# Patient Record
Sex: MALE | Race: BLACK OR AFRICAN AMERICAN | Employment: OTHER | URBAN - METROPOLITAN AREA
[De-identification: names, ages, dates, MRNs, and addresses within clinical notes are randomized per-mention and may not be internally consistent; named-entity substitution may affect disease eponyms.]

---

## 2017-02-17 RX ORDER — ERGOCALCIFEROL 1.25 MG/1
CAPSULE ORAL
Qty: 4 CAPSULE | Refills: 0 | Status: SHIPPED | OUTPATIENT
Start: 2017-02-17 | End: 2017-05-16 | Stop reason: ALTCHOICE

## 2017-03-13 RX ORDER — LOSARTAN POTASSIUM 100 MG/1
TABLET ORAL
Qty: 30 TABLET | Refills: 1 | Status: SHIPPED | OUTPATIENT
Start: 2017-03-13 | End: 2017-05-16 | Stop reason: ALTCHOICE

## 2017-03-31 ENCOUNTER — OFFICE VISIT (OUTPATIENT)
Dept: FAMILY MEDICINE CLINIC | Age: 62
End: 2017-03-31
Payer: MEDICAID

## 2017-03-31 ENCOUNTER — HOSPITAL ENCOUNTER (OUTPATIENT)
Age: 62
Setting detail: SPECIMEN
Discharge: HOME | End: 2017-03-31
Payer: MEDICAID

## 2017-03-31 VITALS
BODY MASS INDEX: 39.57 KG/M2 | HEART RATE: 80 BPM | SYSTOLIC BLOOD PRESSURE: 130 MMHG | DIASTOLIC BLOOD PRESSURE: 80 MMHG | WEIGHT: 237.5 LBS | TEMPERATURE: 98 F | OXYGEN SATURATION: 98 % | HEIGHT: 65 IN

## 2017-03-31 DIAGNOSIS — R31.9 HEMATURIA: ICD-10-CM

## 2017-03-31 DIAGNOSIS — Z01.818 PREOPERATIVE CLEARANCE: ICD-10-CM

## 2017-03-31 DIAGNOSIS — R73.9 HYPERGLYCEMIA: ICD-10-CM

## 2017-03-31 DIAGNOSIS — J44.9 CHRONIC OBSTRUCTIVE PULMONARY DISEASE, UNSPECIFIED COPD TYPE (HCC): ICD-10-CM

## 2017-03-31 DIAGNOSIS — Z01.818 PREOP EXAMINATION: ICD-10-CM

## 2017-03-31 DIAGNOSIS — R06.81 APNEIC EPISODE: ICD-10-CM

## 2017-03-31 DIAGNOSIS — E55.9 VITAMIN D DEFICIENCY: ICD-10-CM

## 2017-03-31 DIAGNOSIS — Z12.5 SCREENING FOR PROSTATE CANCER: ICD-10-CM

## 2017-03-31 DIAGNOSIS — M25.569 CHRONIC KNEE PAIN, UNSPECIFIED LATERALITY: ICD-10-CM

## 2017-03-31 DIAGNOSIS — R73.9 HYPERGLYCEMIA: Primary | ICD-10-CM

## 2017-03-31 DIAGNOSIS — Z86.19 HISTORY OF CHICKEN POX: ICD-10-CM

## 2017-03-31 DIAGNOSIS — R06.83 SNORING: ICD-10-CM

## 2017-03-31 DIAGNOSIS — G89.29 CHRONIC KNEE PAIN, UNSPECIFIED LATERALITY: ICD-10-CM

## 2017-03-31 DIAGNOSIS — I10 ESSENTIAL HYPERTENSION: ICD-10-CM

## 2017-03-31 DIAGNOSIS — Z23 NEED FOR TETANUS BOOSTER: ICD-10-CM

## 2017-03-31 LAB
ABSOLUTE EOS #: 0.1 K/UL (ref 0–0.4)
ABSOLUTE LYMPH #: 2.3 K/UL (ref 1–4.8)
ABSOLUTE MONO #: 0.6 K/UL (ref 0.1–1.2)
ALBUMIN SERPL-MCNC: 4.2 G/DL (ref 3.5–5.2)
ALBUMIN/GLOBULIN RATIO: 1.2 (ref 1–2.5)
ALP BLD-CCNC: 47 U/L (ref 40–129)
ALT SERPL-CCNC: 14 U/L (ref 5–41)
ANION GAP SERPL CALCULATED.3IONS-SCNC: 13 MMOL/L (ref 9–17)
AST SERPL-CCNC: 22 U/L
BASOPHILS # BLD: 1 % (ref 0–2)
BASOPHILS ABSOLUTE: 0.1 K/UL (ref 0–0.2)
BILIRUB SERPL-MCNC: 0.59 MG/DL (ref 0.3–1.2)
BUN BLDV-MCNC: 11 MG/DL (ref 8–23)
BUN/CREAT BLD: NORMAL (ref 9–20)
CALCIUM SERPL-MCNC: 9.4 MG/DL (ref 8.6–10.4)
CHLORIDE BLD-SCNC: 103 MMOL/L (ref 98–107)
CHOLESTEROL/HDL RATIO: 3.4
CHOLESTEROL: 152 MG/DL
CO2: 25 MMOL/L (ref 20–31)
CREAT SERPL-MCNC: 0.8 MG/DL (ref 0.7–1.2)
DIFFERENTIAL TYPE: ABNORMAL
EOSINOPHILS RELATIVE PERCENT: 1 % (ref 1–4)
ESTIMATED AVERAGE GLUCOSE: 94 MG/DL
GFR AFRICAN AMERICAN: >60 ML/MIN
GFR NON-AFRICAN AMERICAN: >60 ML/MIN
GFR SERPL CREATININE-BSD FRML MDRD: NORMAL ML/MIN/{1.73_M2}
GFR SERPL CREATININE-BSD FRML MDRD: NORMAL ML/MIN/{1.73_M2}
GLUCOSE BLD-MCNC: 94 MG/DL (ref 70–99)
HBA1C MFR BLD: 4.9 % (ref 4–6)
HCT VFR BLD CALC: 42.8 % (ref 41–53)
HDLC SERPL-MCNC: 45 MG/DL
HEMOGLOBIN: 14.4 G/DL (ref 13.5–17.5)
INR BLD: 1.1
INSULIN COMMENT: NORMAL
INSULIN REFERENCE RANGE:: NORMAL
INSULIN: 12.5 MU/L
LDL CHOLESTEROL: 93 MG/DL (ref 0–130)
LYMPHOCYTES # BLD: 32 % (ref 24–44)
MCH RBC QN AUTO: 32.8 PG (ref 26–34)
MCHC RBC AUTO-ENTMCNC: 33.7 G/DL (ref 31–37)
MCV RBC AUTO: 97.3 FL (ref 80–100)
MONOCYTES # BLD: 9 % (ref 2–11)
PARTIAL THROMBOPLASTIN TIME: 23.9 SEC (ref 21.3–31.3)
PDW BLD-RTO: 13.2 % (ref 12.5–15.4)
PLATELET # BLD: 305 K/UL (ref 140–450)
PLATELET ESTIMATE: ABNORMAL
PMV BLD AUTO: 9.1 FL (ref 6–12)
POTASSIUM SERPL-SCNC: 4.1 MMOL/L (ref 3.7–5.3)
PROSTATE SPECIFIC ANTIGEN: 1.4 UG/L
PROTHROMBIN TIME: 11.4 SEC (ref 9.4–12.6)
RBC # BLD: 4.39 M/UL (ref 4.5–5.9)
RBC # BLD: ABNORMAL 10*6/UL
SEG NEUTROPHILS: 57 % (ref 36–66)
SEGMENTED NEUTROPHILS ABSOLUTE COUNT: 4.2 K/UL (ref 1.8–7.7)
SODIUM BLD-SCNC: 141 MMOL/L (ref 135–144)
TOTAL PROTEIN: 7.6 G/DL (ref 6.4–8.3)
TRIGL SERPL-MCNC: 68 MG/DL
TSH SERPL DL<=0.05 MIU/L-ACNC: 1.75 MIU/L (ref 0.3–5)
VITAMIN D 25-HYDROXY: 10.7 NG/ML (ref 30–100)
VLDLC SERPL CALC-MCNC: NORMAL MG/DL (ref 1–30)
WBC # BLD: 7.3 K/UL (ref 3.5–11)
WBC # BLD: ABNORMAL 10*3/UL

## 2017-03-31 PROCEDURE — 99214 OFFICE O/P EST MOD 30 MIN: CPT | Performed by: PHYSICIAN ASSISTANT

## 2017-03-31 PROCEDURE — 93000 ELECTROCARDIOGRAM COMPLETE: CPT | Performed by: PHYSICIAN ASSISTANT

## 2017-03-31 RX ORDER — AZITHROMYCIN 250 MG/1
TABLET, FILM COATED ORAL
Qty: 1 PACKET | Refills: 0 | Status: SHIPPED | OUTPATIENT
Start: 2017-03-31 | End: 2017-04-10

## 2017-03-31 ASSESSMENT — ENCOUNTER SYMPTOMS
ORTHOPNEA: 0
COUGH: 0
RHINORRHEA: 0
VOMITING: 0
CHEST TIGHTNESS: 0
ABDOMINAL PAIN: 0
EYE ITCHING: 0
DIARRHEA: 0
NAUSEA: 0
EYE DISCHARGE: 0
BLURRED VISION: 0
SINUS PRESSURE: 0
SHORTNESS OF BREATH: 0
SORE THROAT: 0

## 2017-04-02 LAB — VARICELLA-ZOSTER AB, IGM: 0.02 ISR

## 2017-05-16 ENCOUNTER — HOSPITAL ENCOUNTER (OUTPATIENT)
Dept: GENERAL RADIOLOGY | Facility: CLINIC | Age: 62
Discharge: HOME | End: 2017-05-16
Payer: MEDICAID

## 2017-05-16 ENCOUNTER — HOSPITAL ENCOUNTER (OUTPATIENT)
Age: 62
Setting detail: SPECIMEN
Discharge: HOME | End: 2017-05-16
Payer: MEDICAID

## 2017-05-16 ENCOUNTER — HOSPITAL ENCOUNTER (OUTPATIENT)
Facility: CLINIC | Age: 62
Discharge: HOME | End: 2017-05-16
Payer: MEDICAID

## 2017-05-16 DIAGNOSIS — E55.9 VITAMIN D DEFICIENCY: ICD-10-CM

## 2017-05-16 DIAGNOSIS — Z01.818 PRE-OPERATIVE CLEARANCE: ICD-10-CM

## 2017-05-16 DIAGNOSIS — J44.9 CHRONIC OBSTRUCTIVE PULMONARY DISEASE, UNSPECIFIED COPD TYPE (HCC): ICD-10-CM

## 2017-05-16 PROBLEM — G47.33 OBSTRUCTIVE SLEEP APNEA: Status: ACTIVE | Noted: 2017-05-16

## 2017-05-16 PROBLEM — M54.2 CHRONIC NECK PAIN: Status: ACTIVE | Noted: 2017-05-16

## 2017-05-16 PROBLEM — R09.89 CHEST CONGESTION: Status: ACTIVE | Noted: 2017-05-16

## 2017-05-16 PROBLEM — J45.20 MILD INTERMITTENT ASTHMA WITHOUT COMPLICATION: Status: ACTIVE | Noted: 2017-05-16

## 2017-05-16 PROBLEM — I77.1 TORTUOUS AORTA (HCC): Status: ACTIVE | Noted: 2017-05-16

## 2017-05-16 PROBLEM — G89.29 CHRONIC NECK PAIN: Status: ACTIVE | Noted: 2017-05-16

## 2017-05-16 PROBLEM — Z86.69 HISTORY OF EPILEPSY: Status: ACTIVE | Noted: 2017-05-16

## 2017-05-16 PROBLEM — J30.2 SEASONAL ALLERGIC RHINITIS: Status: ACTIVE | Noted: 2017-05-16

## 2017-05-16 LAB
-: NORMAL
ALBUMIN SERPL-MCNC: 4.3 G/DL (ref 3.5–5.2)
ALBUMIN/GLOBULIN RATIO: 1.2 (ref 1–2.5)
ALP BLD-CCNC: 56 U/L (ref 40–129)
ALT SERPL-CCNC: 17 U/L (ref 5–41)
AMORPHOUS: NORMAL
ANION GAP SERPL CALCULATED.3IONS-SCNC: 13 MMOL/L (ref 9–17)
AST SERPL-CCNC: 22 U/L
BACTERIA: NORMAL
BILIRUB SERPL-MCNC: 0.43 MG/DL (ref 0.3–1.2)
BILIRUBIN URINE: ABNORMAL
BUN BLDV-MCNC: 8 MG/DL (ref 8–23)
BUN/CREAT BLD: NORMAL (ref 9–20)
CALCIUM SERPL-MCNC: 10 MG/DL (ref 8.6–10.4)
CASTS UA: NORMAL /LPF (ref 0–8)
CHLORIDE BLD-SCNC: 98 MMOL/L (ref 98–107)
CO2: 25 MMOL/L (ref 20–31)
COLOR: ABNORMAL
COMMENT UA: ABNORMAL
CREAT SERPL-MCNC: 0.92 MG/DL (ref 0.7–1.2)
CRYSTALS, UA: NORMAL /HPF
EPITHELIAL CELLS UA: NORMAL /HPF (ref 0–5)
GFR AFRICAN AMERICAN: >60 ML/MIN
GFR NON-AFRICAN AMERICAN: >60 ML/MIN
GFR SERPL CREATININE-BSD FRML MDRD: NORMAL ML/MIN/{1.73_M2}
GFR SERPL CREATININE-BSD FRML MDRD: NORMAL ML/MIN/{1.73_M2}
GLUCOSE BLD-MCNC: 93 MG/DL (ref 70–99)
GLUCOSE URINE: NEGATIVE
HCT VFR BLD CALC: 45.3 % (ref 41–53)
HEMOGLOBIN: 14.9 G/DL (ref 13.5–17.5)
KETONES, URINE: NEGATIVE
LEUKOCYTE ESTERASE, URINE: ABNORMAL
MCH RBC QN AUTO: 32.7 PG (ref 26–34)
MCHC RBC AUTO-ENTMCNC: 32.8 G/DL (ref 31–37)
MCV RBC AUTO: 99.4 FL (ref 80–100)
MUCUS: NORMAL
NITRITE, URINE: NEGATIVE
OTHER OBSERVATIONS UA: NORMAL
PDW BLD-RTO: 12.9 % (ref 12.5–15.4)
PH UA: 5.5 (ref 5–8)
PLATELET # BLD: 350 K/UL (ref 140–450)
PMV BLD AUTO: 8.5 FL (ref 6–12)
POTASSIUM SERPL-SCNC: 4.3 MMOL/L (ref 3.7–5.3)
PROTEIN UA: NEGATIVE
RBC # BLD: 4.56 M/UL (ref 4.5–5.9)
RBC UA: NORMAL /HPF (ref 0–4)
RENAL EPITHELIAL, UA: NORMAL /HPF
SODIUM BLD-SCNC: 136 MMOL/L (ref 135–144)
SPECIFIC GRAVITY UA: 1.02 (ref 1–1.03)
TOTAL PROTEIN: 7.9 G/DL (ref 6.4–8.3)
TRICHOMONAS: NORMAL
TURBIDITY: CLEAR
URINE HGB: ABNORMAL
UROBILINOGEN, URINE: NORMAL
VITAMIN D 25-HYDROXY: 9.6 NG/ML (ref 30–100)
WBC # BLD: 7.7 K/UL (ref 3.5–11)
WBC UA: NORMAL /HPF (ref 0–5)
YEAST: NORMAL

## 2017-05-16 PROCEDURE — 71020 XR CHEST STANDARD TWO VW: CPT

## 2017-05-17 LAB
CULTURE: NO GROWTH
CULTURE: NORMAL
Lab: NORMAL
SPECIMEN DESCRIPTION: NORMAL
STATUS: NORMAL

## 2017-06-26 PROBLEM — Z96.659 HISTORY OF TOTAL KNEE REPLACEMENT: Status: ACTIVE | Noted: 2017-02-16

## 2017-06-27 ENCOUNTER — HOSPITAL ENCOUNTER (OUTPATIENT)
Age: 62
Setting detail: SPECIMEN
Discharge: HOME | End: 2017-06-27
Payer: MEDICAID

## 2017-06-27 ENCOUNTER — HOSPITAL ENCOUNTER (OUTPATIENT)
Facility: CLINIC | Age: 62
Discharge: HOME | End: 2017-06-27
Payer: MEDICAID

## 2017-06-27 ENCOUNTER — HOSPITAL ENCOUNTER (OUTPATIENT)
Dept: GENERAL RADIOLOGY | Facility: CLINIC | Age: 62
Discharge: HOME | End: 2017-06-27
Payer: MEDICAID

## 2017-06-27 DIAGNOSIS — Z01.818 PRE-OP EVALUATION: ICD-10-CM

## 2017-06-27 DIAGNOSIS — I77.1 TORTUOUS AORTA (HCC): ICD-10-CM

## 2017-06-27 DIAGNOSIS — R31.9 HEMATURIA: ICD-10-CM

## 2017-06-27 PROBLEM — F11.21 HISTORY OF NARCOTIC ADDICTION (HCC): Status: ACTIVE | Noted: 2017-06-27

## 2017-06-27 LAB
-: NORMAL
AMORPHOUS: NORMAL
ANION GAP SERPL CALCULATED.3IONS-SCNC: 13 MMOL/L (ref 9–17)
BACTERIA: NORMAL
BILIRUBIN URINE: NEGATIVE
BUN BLDV-MCNC: 14 MG/DL (ref 8–23)
BUN/CREAT BLD: NORMAL (ref 9–20)
CALCIUM SERPL-MCNC: 9.6 MG/DL (ref 8.6–10.4)
CASTS UA: NORMAL /LPF (ref 0–8)
CHLORIDE BLD-SCNC: 98 MMOL/L (ref 98–107)
CO2: 27 MMOL/L (ref 20–31)
COLOR: ABNORMAL
COMMENT UA: ABNORMAL
CREAT SERPL-MCNC: 1.01 MG/DL (ref 0.7–1.2)
CRYSTALS, UA: NORMAL /HPF
EPITHELIAL CELLS UA: NORMAL /HPF (ref 0–5)
GFR AFRICAN AMERICAN: >60 ML/MIN
GFR NON-AFRICAN AMERICAN: >60 ML/MIN
GFR SERPL CREATININE-BSD FRML MDRD: NORMAL ML/MIN/{1.73_M2}
GFR SERPL CREATININE-BSD FRML MDRD: NORMAL ML/MIN/{1.73_M2}
GLUCOSE BLD-MCNC: 72 MG/DL (ref 70–99)
GLUCOSE URINE: NEGATIVE
HCT VFR BLD CALC: 45.1 % (ref 41–53)
HEMOGLOBIN: 14.9 G/DL (ref 13.5–17.5)
KETONES, URINE: NEGATIVE
LEUKOCYTE ESTERASE, URINE: ABNORMAL
MCH RBC QN AUTO: 32.8 PG (ref 26–34)
MCHC RBC AUTO-ENTMCNC: 32.9 G/DL (ref 31–37)
MCV RBC AUTO: 99.7 FL (ref 80–100)
MUCUS: NORMAL
NITRITE, URINE: NEGATIVE
OTHER OBSERVATIONS UA: NORMAL
PDW BLD-RTO: 13.5 % (ref 12.5–15.4)
PH UA: 6 (ref 5–8)
PLATELET # BLD: 338 K/UL (ref 140–450)
PMV BLD AUTO: 8.3 FL (ref 6–12)
POTASSIUM SERPL-SCNC: 4.3 MMOL/L (ref 3.7–5.3)
PROTEIN UA: NEGATIVE
RBC # BLD: 4.53 M/UL (ref 4.5–5.9)
RBC UA: NORMAL /HPF (ref 0–4)
RENAL EPITHELIAL, UA: NORMAL /HPF
SODIUM BLD-SCNC: 138 MMOL/L (ref 135–144)
SPECIFIC GRAVITY UA: 1.02 (ref 1–1.03)
TRICHOMONAS: NORMAL
TURBIDITY: CLEAR
URINE HGB: NEGATIVE
UROBILINOGEN, URINE: NORMAL
WBC # BLD: 7.4 K/UL (ref 3.5–11)
WBC UA: NORMAL /HPF (ref 0–5)
YEAST: NORMAL

## 2017-06-27 PROCEDURE — 71020 XR CHEST STANDARD TWO VW: CPT

## 2017-06-29 ENCOUNTER — HOSPITAL ENCOUNTER (OUTPATIENT)
Age: 62
Setting detail: SPECIMEN
Discharge: HOME | End: 2017-06-29
Payer: MEDICAID

## 2017-06-29 DIAGNOSIS — Z01.818 PRE-OP EVALUATION: ICD-10-CM

## 2017-06-29 DIAGNOSIS — R82.998 LEUKOCYTES IN URINE: ICD-10-CM

## 2017-06-29 LAB
INR BLD: 1
PARTIAL THROMBOPLASTIN TIME: 24.9 SEC (ref 21.3–31.3)
PROTHROMBIN TIME: 10.9 SEC (ref 9.4–12.6)

## 2017-06-30 LAB
CULTURE: NO GROWTH
CULTURE: NORMAL
Lab: NORMAL
SPECIMEN DESCRIPTION: NORMAL
STATUS: NORMAL

## 2017-07-07 ENCOUNTER — HOSPITAL ENCOUNTER (OUTPATIENT)
Dept: CT IMAGING | Age: 62
Discharge: HOME | End: 2017-07-07
Payer: MEDICAID

## 2017-07-07 DIAGNOSIS — I77.1 TORTUOUS AORTA (HCC): ICD-10-CM

## 2017-07-07 PROCEDURE — 6360000004 HC RX CONTRAST MEDICATION: Performed by: NURSE PRACTITIONER

## 2017-07-07 PROCEDURE — 71275 CT ANGIOGRAPHY CHEST: CPT

## 2017-07-07 RX ADMIN — IOVERSOL 100 ML: 741 INJECTION INTRA-ARTERIAL; INTRAVENOUS at 15:42

## 2017-07-10 PROBLEM — R91.8 GROUND GLASS OPACITY PRESENT ON IMAGING OF LUNG: Status: ACTIVE | Noted: 2017-07-10

## 2017-07-10 PROBLEM — N28.1 COMPLEX RENAL CYST: Status: ACTIVE | Noted: 2017-07-10

## 2017-07-10 PROBLEM — I77.810 ASCENDING AORTA DILATATION (HCC): Status: ACTIVE | Noted: 2017-07-10

## 2017-07-10 PROBLEM — K76.9 LIVER LESION: Status: ACTIVE | Noted: 2017-07-10

## 2017-07-28 PROBLEM — M25.562 CHRONIC PAIN OF LEFT KNEE: Status: ACTIVE | Noted: 2017-07-28

## 2017-07-28 PROBLEM — G89.29 CHRONIC PAIN OF LEFT KNEE: Status: ACTIVE | Noted: 2017-07-28

## 2017-08-22 DIAGNOSIS — I10 ESSENTIAL HYPERTENSION: ICD-10-CM

## 2017-08-22 RX ORDER — LOSARTAN POTASSIUM 100 MG/1
TABLET ORAL
Qty: 30 TABLET | Refills: 1 | OUTPATIENT
Start: 2017-08-22

## 2017-09-05 ENCOUNTER — TELEPHONE (OUTPATIENT)
Dept: CARDIOTHORACIC SURGERY | Age: 62
End: 2017-09-05

## 2017-10-02 ENCOUNTER — HOSPITAL ENCOUNTER (OUTPATIENT)
Dept: GENERAL RADIOLOGY | Facility: CLINIC | Age: 62
Discharge: HOME | End: 2017-10-02
Payer: MEDICAID

## 2017-10-02 ENCOUNTER — HOSPITAL ENCOUNTER (OUTPATIENT)
Facility: CLINIC | Age: 62
Discharge: HOME | End: 2017-10-02
Payer: MEDICAID

## 2017-10-02 ENCOUNTER — HOSPITAL ENCOUNTER (OUTPATIENT)
Age: 62
Setting detail: SPECIMEN
Discharge: HOME | End: 2017-10-02
Payer: MEDICAID

## 2017-10-02 DIAGNOSIS — H53.9 VISUAL DISTURBANCE: ICD-10-CM

## 2017-10-02 DIAGNOSIS — J40 BRONCHITIS: ICD-10-CM

## 2017-10-02 DIAGNOSIS — R07.9 CHEST PAIN, UNSPECIFIED TYPE: ICD-10-CM

## 2017-10-02 DIAGNOSIS — Z11.59 NEED FOR HEPATITIS C SCREENING TEST: ICD-10-CM

## 2017-10-02 DIAGNOSIS — Z11.4 SCREENING FOR HIV (HUMAN IMMUNODEFICIENCY VIRUS): ICD-10-CM

## 2017-10-02 DIAGNOSIS — E55.9 VITAMIN D DEFICIENCY: ICD-10-CM

## 2017-10-02 PROBLEM — R51.9 TEMPORAL PAIN: Status: ACTIVE | Noted: 2017-10-02

## 2017-10-02 PROBLEM — Z80.8 FAMILY HISTORY OF BRAIN CANCER: Status: ACTIVE | Noted: 2017-10-02

## 2017-10-02 LAB
BNP INTERPRETATION: NORMAL
HEPATITIS C ANTIBODY: NONREACTIVE
PRO-BNP: 24 PG/ML
VITAMIN D 25-HYDROXY: 19.4 NG/ML (ref 30–100)

## 2017-10-02 PROCEDURE — 71020 XR CHEST STANDARD TWO VW: CPT

## 2017-10-03 LAB
ESTIMATED AVERAGE GLUCOSE: 94 MG/DL
HBA1C MFR BLD: 4.9 % (ref 4–6)
HIV AG/AB: NONREACTIVE

## 2017-10-10 ENCOUNTER — INITIAL CONSULT (OUTPATIENT)
Dept: CARDIOTHORACIC SURGERY | Age: 62
End: 2017-10-10
Payer: MEDICAID

## 2017-10-10 VITALS
TEMPERATURE: 97.7 F | SYSTOLIC BLOOD PRESSURE: 138 MMHG | HEIGHT: 69 IN | DIASTOLIC BLOOD PRESSURE: 90 MMHG | OXYGEN SATURATION: 96 % | RESPIRATION RATE: 19 BRPM | BODY MASS INDEX: 35.55 KG/M2 | HEART RATE: 67 BPM | WEIGHT: 240 LBS

## 2017-10-10 DIAGNOSIS — I10 ESSENTIAL HYPERTENSION: ICD-10-CM

## 2017-10-10 DIAGNOSIS — I71.21 ASCENDING AORTIC ANEURYSM: Primary | ICD-10-CM

## 2017-10-10 PROCEDURE — 99213 OFFICE O/P EST LOW 20 MIN: CPT | Performed by: NURSE PRACTITIONER

## 2017-10-10 RX ORDER — AMLODIPINE BESYLATE 10 MG/1
TABLET ORAL
Qty: 30 TABLET | Refills: 2 | Status: SHIPPED | OUTPATIENT
Start: 2017-10-10 | End: 2017-11-29 | Stop reason: SDUPTHER

## 2017-12-21 ENCOUNTER — HOSPITAL ENCOUNTER (OUTPATIENT)
Age: 62
Setting detail: SPECIMEN
Discharge: HOME | End: 2017-12-21
Payer: MEDICAID

## 2017-12-21 ENCOUNTER — HOSPITAL ENCOUNTER (OUTPATIENT)
Dept: CT IMAGING | Facility: CLINIC | Age: 62
Discharge: HOME | End: 2017-12-21
Payer: MEDICAID

## 2017-12-21 ENCOUNTER — HOSPITAL ENCOUNTER (OUTPATIENT)
Dept: GENERAL RADIOLOGY | Facility: CLINIC | Age: 62
Discharge: HOME | End: 2017-12-21
Payer: MEDICAID

## 2017-12-21 ENCOUNTER — HOSPITAL ENCOUNTER (OUTPATIENT)
Facility: CLINIC | Age: 62
Discharge: HOME | End: 2017-12-21
Payer: MEDICAID

## 2017-12-21 DIAGNOSIS — H53.9 VISUAL DISTURBANCE: ICD-10-CM

## 2017-12-21 DIAGNOSIS — Z80.8 FAMILY HISTORY OF BRAIN CANCER: ICD-10-CM

## 2017-12-21 DIAGNOSIS — Z01.818 PRE-OP EVALUATION: ICD-10-CM

## 2017-12-21 DIAGNOSIS — R42 DIZZINESS: ICD-10-CM

## 2017-12-21 DIAGNOSIS — R51.9 TEMPORAL PAIN: ICD-10-CM

## 2017-12-21 PROBLEM — M51.34 THORACIC DEGENERATIVE DISC DISEASE: Status: ACTIVE | Noted: 2017-12-21

## 2017-12-21 PROBLEM — R31.29 MICROSCOPIC HEMATURIA: Status: ACTIVE | Noted: 2017-12-21

## 2017-12-21 LAB
-: NORMAL
ALBUMIN SERPL-MCNC: 4.1 G/DL (ref 3.5–5.2)
ALBUMIN/GLOBULIN RATIO: 1.1 (ref 1–2.5)
ALP BLD-CCNC: 51 U/L (ref 40–129)
ALT SERPL-CCNC: 15 U/L (ref 5–41)
AMORPHOUS: NORMAL
ANION GAP SERPL CALCULATED.3IONS-SCNC: 15 MMOL/L (ref 9–17)
AST SERPL-CCNC: 20 U/L
BACTERIA: NORMAL
BILIRUB SERPL-MCNC: 0.58 MG/DL (ref 0.3–1.2)
BILIRUBIN URINE: NEGATIVE
BUN BLDV-MCNC: 16 MG/DL (ref 8–23)
BUN/CREAT BLD: ABNORMAL (ref 9–20)
CALCIUM SERPL-MCNC: 9.3 MG/DL (ref 8.6–10.4)
CASTS UA: NORMAL /LPF (ref 0–8)
CHLORIDE BLD-SCNC: 102 MMOL/L (ref 98–107)
CO2: 24 MMOL/L (ref 20–31)
COLOR: YELLOW
COMMENT UA: ABNORMAL
CREAT SERPL-MCNC: 0.98 MG/DL (ref 0.7–1.2)
CRYSTALS, UA: NORMAL /HPF
EPITHELIAL CELLS UA: NORMAL /HPF (ref 0–5)
GFR AFRICAN AMERICAN: >60 ML/MIN
GFR NON-AFRICAN AMERICAN: >60 ML/MIN
GFR SERPL CREATININE-BSD FRML MDRD: ABNORMAL ML/MIN/{1.73_M2}
GFR SERPL CREATININE-BSD FRML MDRD: ABNORMAL ML/MIN/{1.73_M2}
GLUCOSE BLD-MCNC: 100 MG/DL (ref 70–99)
GLUCOSE URINE: NEGATIVE
HCT VFR BLD CALC: 46.5 % (ref 40.7–50.3)
HEMOGLOBIN: 15.2 G/DL (ref 13–17)
INR BLD: 1
KETONES, URINE: NEGATIVE
LEUKOCYTE ESTERASE, URINE: NEGATIVE
MCH RBC QN AUTO: 32.2 PG (ref 25.2–33.5)
MCHC RBC AUTO-ENTMCNC: 32.7 G/DL (ref 28.4–34.8)
MCV RBC AUTO: 98.5 FL (ref 82.6–102.9)
MUCUS: NORMAL
NITRITE, URINE: NEGATIVE
OTHER OBSERVATIONS UA: NORMAL
PDW BLD-RTO: 11.2 % (ref 11.8–14.4)
PH UA: 6 (ref 5–8)
PLATELET # BLD: 319 K/UL (ref 138–453)
PMV BLD AUTO: 10 FL (ref 8.1–13.5)
POTASSIUM SERPL-SCNC: 4 MMOL/L (ref 3.7–5.3)
PROTEIN UA: NEGATIVE
PROTHROMBIN TIME: 11.1 SEC (ref 9.4–12.6)
RBC # BLD: 4.72 M/UL (ref 4.21–5.77)
RBC UA: NORMAL /HPF (ref 0–4)
RENAL EPITHELIAL, UA: NORMAL /HPF
SODIUM BLD-SCNC: 141 MMOL/L (ref 135–144)
SPECIFIC GRAVITY UA: 1.02 (ref 1–1.03)
TOTAL PROTEIN: 7.8 G/DL (ref 6.4–8.3)
TRICHOMONAS: NORMAL
TURBIDITY: ABNORMAL
URINE HGB: ABNORMAL
UROBILINOGEN, URINE: NORMAL
WBC # BLD: 6.8 K/UL (ref 3.5–11.3)
WBC UA: NORMAL /HPF (ref 0–5)
YEAST: NORMAL

## 2017-12-21 PROCEDURE — 71020 XR CHEST STANDARD TWO VW: CPT

## 2017-12-21 PROCEDURE — 70450 CT HEAD/BRAIN W/O DYE: CPT

## 2018-01-02 PROBLEM — Z99.89 OSA ON CPAP: Status: ACTIVE | Noted: 2017-05-16

## 2018-01-08 ENCOUNTER — APPOINTMENT (OUTPATIENT)
Dept: CT IMAGING | Age: 63
End: 2018-01-08
Payer: MEDICAID

## 2018-01-08 ENCOUNTER — HOSPITAL ENCOUNTER (OUTPATIENT)
Dept: ULTRASOUND IMAGING | Age: 63
Discharge: HOME | End: 2018-01-08
Payer: MEDICAID

## 2018-01-08 ENCOUNTER — HOSPITAL ENCOUNTER (OUTPATIENT)
Age: 63
Setting detail: SPECIMEN
Discharge: HOME | End: 2018-01-08
Payer: MEDICAID

## 2018-01-08 ENCOUNTER — HOSPITAL ENCOUNTER (EMERGENCY)
Age: 63
Discharge: HOME | End: 2018-01-09
Attending: EMERGENCY MEDICINE
Payer: MEDICAID

## 2018-01-08 ENCOUNTER — HOSPITAL ENCOUNTER (OUTPATIENT)
Age: 63
Discharge: HOME | End: 2018-01-08
Payer: MEDICAID

## 2018-01-08 DIAGNOSIS — R81 GLUCOSE FOUND IN URINE ON EXAMINATION: ICD-10-CM

## 2018-01-08 DIAGNOSIS — R74.8 ELEVATED LIVER ENZYMES: Primary | ICD-10-CM

## 2018-01-08 DIAGNOSIS — N28.1 COMPLEX RENAL CYST: ICD-10-CM

## 2018-01-08 DIAGNOSIS — R31.0 GROSS HEMATURIA: ICD-10-CM

## 2018-01-08 DIAGNOSIS — R82.998 LEUKOCYTES IN URINE: ICD-10-CM

## 2018-01-08 DIAGNOSIS — R82.2 BILIRUBIN IN URINE: ICD-10-CM

## 2018-01-08 DIAGNOSIS — R73.09 ELEVATED GLUCOSE: ICD-10-CM

## 2018-01-08 DIAGNOSIS — R39.89 URINE DISCOLORATION: ICD-10-CM

## 2018-01-08 PROBLEM — Z98.890 STATUS POST LEFT KNEE SURGERY: Status: ACTIVE | Noted: 2018-01-08

## 2018-01-08 PROBLEM — M79.89 LEFT LEG SWELLING: Status: ACTIVE | Noted: 2018-01-08

## 2018-01-08 PROBLEM — R17 SERUM TOTAL BILIRUBIN ELEVATED: Status: ACTIVE | Noted: 2018-01-08

## 2018-01-08 PROBLEM — D64.9 ANEMIA: Status: ACTIVE | Noted: 2018-01-08

## 2018-01-08 LAB
ABSOLUTE EOS #: 0 K/UL (ref 0–0.4)
ABSOLUTE EOS #: 0.2 K/UL (ref 0–0.4)
ABSOLUTE IMMATURE GRANULOCYTE: ABNORMAL K/UL (ref 0–0.3)
ABSOLUTE IMMATURE GRANULOCYTE: ABNORMAL K/UL (ref 0–0.3)
ABSOLUTE LYMPH #: 1.41 K/UL (ref 1–4.8)
ABSOLUTE LYMPH #: 1.9 K/UL (ref 1–4.8)
ABSOLUTE MONO #: 1 K/UL (ref 0.1–1.3)
ABSOLUTE MONO #: 1.01 K/UL (ref 0.1–1.3)
ACETAMINOPHEN LEVEL: <10 UG/ML (ref 10–30)
ALBUMIN SERPL-MCNC: 3.2 G/DL (ref 3.5–5.2)
ALBUMIN SERPL-MCNC: 3.7 G/DL (ref 3.5–5.2)
ALBUMIN/GLOBULIN RATIO: ABNORMAL (ref 1–2.5)
ALBUMIN/GLOBULIN RATIO: ABNORMAL (ref 1–2.5)
ALP BLD-CCNC: 142 U/L (ref 40–129)
ALP BLD-CCNC: 157 U/L (ref 40–129)
ALT SERPL-CCNC: 460 U/L (ref 5–41)
ALT SERPL-CCNC: 480 U/L (ref 5–41)
ANION GAP SERPL CALCULATED.3IONS-SCNC: 13 MMOL/L (ref 9–17)
ANION GAP SERPL CALCULATED.3IONS-SCNC: 14 MMOL/L (ref 9–17)
AST SERPL-CCNC: 288 U/L
AST SERPL-CCNC: 321 U/L
BASOPHILS # BLD: 0 % (ref 0–2)
BASOPHILS # BLD: 1 % (ref 0–2)
BASOPHILS ABSOLUTE: 0 K/UL (ref 0–0.2)
BASOPHILS ABSOLUTE: 0.2 K/UL (ref 0–0.2)
BILIRUB SERPL-MCNC: 5.17 MG/DL (ref 0.3–1.2)
BILIRUB SERPL-MCNC: 5.84 MG/DL (ref 0.3–1.2)
BILIRUBIN DIRECT: 4.64 MG/DL
BILIRUBIN, INDIRECT: 1.2 MG/DL (ref 0–1)
BUN BLDV-MCNC: 15 MG/DL (ref 8–23)
BUN BLDV-MCNC: 16 MG/DL (ref 8–23)
BUN/CREAT BLD: ABNORMAL (ref 9–20)
BUN/CREAT BLD: ABNORMAL (ref 9–20)
CALCIUM SERPL-MCNC: 9.6 MG/DL (ref 8.6–10.4)
CALCIUM SERPL-MCNC: 9.6 MG/DL (ref 8.6–10.4)
CHLORIDE BLD-SCNC: 95 MMOL/L (ref 98–107)
CHLORIDE BLD-SCNC: 99 MMOL/L (ref 98–107)
CO2: 26 MMOL/L (ref 20–31)
CO2: 27 MMOL/L (ref 20–31)
CREAT SERPL-MCNC: 0.91 MG/DL (ref 0.7–1.2)
CREAT SERPL-MCNC: 0.92 MG/DL (ref 0.7–1.2)
DIFFERENTIAL TYPE: ABNORMAL
DIFFERENTIAL TYPE: ABNORMAL
EOSINOPHILS RELATIVE PERCENT: 0 % (ref 0–4)
EOSINOPHILS RELATIVE PERCENT: 2 % (ref 0–4)
GFR AFRICAN AMERICAN: >60 ML/MIN
GFR AFRICAN AMERICAN: >60 ML/MIN
GFR NON-AFRICAN AMERICAN: >60 ML/MIN
GFR NON-AFRICAN AMERICAN: >60 ML/MIN
GFR SERPL CREATININE-BSD FRML MDRD: ABNORMAL ML/MIN/{1.73_M2}
GLOBULIN: ABNORMAL G/DL (ref 1.5–3.8)
GLUCOSE BLD-MCNC: 101 MG/DL (ref 70–99)
GLUCOSE BLD-MCNC: 103 MG/DL (ref 70–99)
HCT VFR BLD CALC: 38.2 % (ref 41–53)
HCT VFR BLD CALC: 38.5 % (ref 41–53)
HEMOGLOBIN: 12.9 G/DL (ref 13.5–17.5)
HEMOGLOBIN: 13.2 G/DL (ref 13.5–17.5)
IMMATURE GRANULOCYTES: ABNORMAL %
IMMATURE GRANULOCYTES: ABNORMAL %
LIPASE: 34 U/L (ref 13–60)
LYMPHOCYTES # BLD: 14 % (ref 24–44)
LYMPHOCYTES # BLD: 17 % (ref 24–44)
MCH RBC QN AUTO: 33.4 PG (ref 26–34)
MCH RBC QN AUTO: 34.2 PG (ref 26–34)
MCHC RBC AUTO-ENTMCNC: 33.6 G/DL (ref 31–37)
MCHC RBC AUTO-ENTMCNC: 34.6 G/DL (ref 31–37)
MCV RBC AUTO: 98.9 FL (ref 80–100)
MCV RBC AUTO: 99.5 FL (ref 80–100)
MONOCYTES # BLD: 10 % (ref 1–7)
MONOCYTES # BLD: 9 % (ref 1–7)
MORPHOLOGY: NORMAL
PDW BLD-RTO: 12.4 % (ref 11.5–14.9)
PDW BLD-RTO: 12.6 % (ref 11.5–14.9)
PLATELET # BLD: 352 K/UL (ref 150–450)
PLATELET # BLD: 361 K/UL (ref 150–450)
PLATELET ESTIMATE: ABNORMAL
PLATELET ESTIMATE: ABNORMAL
PMV BLD AUTO: 8.3 FL (ref 6–12)
PMV BLD AUTO: 8.7 FL (ref 6–12)
POTASSIUM SERPL-SCNC: 3.9 MMOL/L (ref 3.7–5.3)
POTASSIUM SERPL-SCNC: 4 MMOL/L (ref 3.7–5.3)
RBC # BLD: 3.87 M/UL (ref 4.5–5.9)
RBC # BLD: 3.87 M/UL (ref 4.5–5.9)
RBC # BLD: ABNORMAL 10*6/UL
RBC # BLD: ABNORMAL 10*6/UL
SEG NEUTROPHILS: 71 % (ref 36–66)
SEG NEUTROPHILS: 76 % (ref 36–66)
SEGMENTED NEUTROPHILS ABSOLUTE COUNT: 7.68 K/UL (ref 1.3–9.1)
SEGMENTED NEUTROPHILS ABSOLUTE COUNT: 7.9 K/UL (ref 1.3–9.1)
SODIUM BLD-SCNC: 134 MMOL/L (ref 135–144)
SODIUM BLD-SCNC: 140 MMOL/L (ref 135–144)
TOTAL PROTEIN: 7.5 G/DL (ref 6.4–8.3)
TOTAL PROTEIN: 7.7 G/DL (ref 6.4–8.3)
WBC # BLD: 10.1 K/UL (ref 3.5–11)
WBC # BLD: 11.3 K/UL (ref 3.5–11)
WBC # BLD: ABNORMAL 10*3/UL
WBC # BLD: ABNORMAL 10*3/UL

## 2018-01-08 PROCEDURE — 6360000004 HC RX CONTRAST MEDICATION: Performed by: EMERGENCY MEDICINE

## 2018-01-08 PROCEDURE — 85025 COMPLETE CBC W/AUTO DIFF WBC: CPT

## 2018-01-08 PROCEDURE — 80076 HEPATIC FUNCTION PANEL: CPT

## 2018-01-08 PROCEDURE — 80048 BASIC METABOLIC PNL TOTAL CA: CPT

## 2018-01-08 PROCEDURE — 36415 COLL VENOUS BLD VENIPUNCTURE: CPT

## 2018-01-08 PROCEDURE — 83036 HEMOGLOBIN GLYCOSYLATED A1C: CPT

## 2018-01-08 PROCEDURE — 74177 CT ABD & PELVIS W/CONTRAST: CPT

## 2018-01-08 PROCEDURE — 80053 COMPREHEN METABOLIC PANEL: CPT

## 2018-01-08 PROCEDURE — 99284 EMERGENCY DEPT VISIT MOD MDM: CPT

## 2018-01-08 PROCEDURE — 2580000003 HC RX 258: Performed by: EMERGENCY MEDICINE

## 2018-01-08 PROCEDURE — 83690 ASSAY OF LIPASE: CPT

## 2018-01-08 PROCEDURE — 76770 US EXAM ABDO BACK WALL COMP: CPT

## 2018-01-08 PROCEDURE — 80307 DRUG TEST PRSMV CHEM ANLYZR: CPT

## 2018-01-08 RX ORDER — SODIUM CHLORIDE 0.9 % (FLUSH) 0.9 %
10 SYRINGE (ML) INJECTION PRN
Status: DISCONTINUED | OUTPATIENT
Start: 2018-01-08 | End: 2018-01-09 | Stop reason: HOSPADM

## 2018-01-08 RX ORDER — 0.9 % SODIUM CHLORIDE 0.9 %
1000 INTRAVENOUS SOLUTION INTRAVENOUS ONCE
Status: COMPLETED | OUTPATIENT
Start: 2018-01-08 | End: 2018-01-08

## 2018-01-08 RX ORDER — 0.9 % SODIUM CHLORIDE 0.9 %
100 INTRAVENOUS SOLUTION INTRAVENOUS ONCE
Status: COMPLETED | OUTPATIENT
Start: 2018-01-08 | End: 2018-01-08

## 2018-01-08 RX ADMIN — SODIUM CHLORIDE 1000 ML: 9 INJECTION, SOLUTION INTRAVENOUS at 23:20

## 2018-01-08 RX ADMIN — Medication 10 ML: at 22:28

## 2018-01-08 RX ADMIN — SODIUM CHLORIDE 100 ML: 9 INJECTION, SOLUTION INTRAVENOUS at 22:27

## 2018-01-08 RX ADMIN — IOPAMIDOL 100 ML: 755 INJECTION, SOLUTION INTRAVENOUS at 22:27

## 2018-01-08 ASSESSMENT — ENCOUNTER SYMPTOMS
EYE REDNESS: 0
COLOR CHANGE: 0
COUGH: 0
EYE DISCHARGE: 0
RHINORRHEA: 0
VOMITING: 0
DIARRHEA: 0
SHORTNESS OF BREATH: 0
SORE THROAT: 0
NAUSEA: 0

## 2018-01-09 VITALS
RESPIRATION RATE: 19 BRPM | DIASTOLIC BLOOD PRESSURE: 77 MMHG | WEIGHT: 233 LBS | HEIGHT: 65 IN | OXYGEN SATURATION: 94 % | SYSTOLIC BLOOD PRESSURE: 145 MMHG | BODY MASS INDEX: 38.82 KG/M2 | HEART RATE: 89 BPM | TEMPERATURE: 98.6 F

## 2018-01-09 PROBLEM — K40.90 INGUINAL HERNIA, LEFT: Status: ACTIVE | Noted: 2018-01-09

## 2018-01-09 PROBLEM — K42.9 UMBILICAL HERNIA: Status: ACTIVE | Noted: 2018-01-09

## 2018-01-09 PROBLEM — Q61.02 MULTIPLE RENAL CYSTS: Status: ACTIVE | Noted: 2018-01-09

## 2018-01-09 LAB
CULTURE: NO GROWTH
CULTURE: NORMAL
ESTIMATED AVERAGE GLUCOSE: 91 MG/DL
HBA1C MFR BLD: 4.8 % (ref 4–6)
Lab: NORMAL
SPECIMEN DESCRIPTION: NORMAL
STATUS: NORMAL

## 2018-01-09 NOTE — ED PROVIDER NOTES
following:     Alkaline Phosphatase 157 (*)      (*)      (*)     Total Bilirubin 5.84 (*)     Bilirubin, Direct 4.64 (*)     Bilirubin, Indirect 1.20 (*)     All other components within normal limits   ACETAMINOPHEN LEVEL - Abnormal; Notable for the following:     Acetaminophen Level <10 (*)     All other components within normal limits   LIPASE       Elevated LFT      EMERGENCY DEPARTMENT COURSE:   Vitals:    Vitals:    01/08/18 2009 01/08/18 2115   BP: (!) 117/51 121/67   Pulse: 109 89   Resp: 14 18   Temp: 97.9 °F (36.6 °C) 98.6 °F (37 °C)   TempSrc: Oral Oral   SpO2: 95% 93%   Weight: 233 lb (105.7 kg)    Height: 5' 5\" (1.651 m)      11:18 PM  The patient is discussed with the nurse that he's been taking 7 Percocet daily, this should fall within the normal prescription range but we will obtain a Tylenol level. 12:16 AM  Patient's laboratory studies unremarkable, I discussed case with the patient's primary care physician who agrees the patient be discharged home with outpatient follow-up. Return if symptoms worsen or change. CRITICAL CARE:      CONSULTS:  None      PROCEDURES:      FINAL IMPRESSION      1.  Elevated liver enzymes          DISPOSITION/PLAN   DISPOSITION Decision To Discharge 01/09/2018 12:15:13 AM          PATIENT REFERRED TO:  RENETTA Mackenzie 67  [address] [facility],[address]  [address] [address]  [phone]    Schedule an appointment as soon as possible for a visit in 1 day        DISCHARGE MEDICATIONS:  New Prescriptions    No medications on file       (Please note that portions of this note were completed with a voice recognition program.  Efforts were made to edit the dictations but occasionally words are mis-transcribed.)    Kate Jensen  Emergency Medicine                  Kate Jensen MD  01/09/18 [id number]

## 2018-01-10 ENCOUNTER — HOSPITAL ENCOUNTER (OUTPATIENT)
Age: 63
Discharge: HOME | End: 2018-01-10
Payer: MEDICAID

## 2018-01-10 ENCOUNTER — HOSPITAL ENCOUNTER (OUTPATIENT)
Dept: VASCULAR LAB | Age: 63
Discharge: HOME | End: 2018-01-10
Payer: MEDICAID

## 2018-01-10 DIAGNOSIS — M79.89 LEFT LEG SWELLING: ICD-10-CM

## 2018-01-10 DIAGNOSIS — Z98.890 STATUS POST LEFT KNEE SURGERY: ICD-10-CM

## 2018-01-10 DIAGNOSIS — R74.8 ELEVATED LIVER ENZYMES: ICD-10-CM

## 2018-01-10 PROBLEM — R79.89 ELEVATED FERRITIN: Status: ACTIVE | Noted: 2018-01-10

## 2018-01-10 PROBLEM — R70.0 ELEVATED SED RATE: Status: ACTIVE | Noted: 2018-01-10

## 2018-01-10 PROBLEM — Z96.659 HISTORY OF TOTAL KNEE REPLACEMENT: Chronic | Status: ACTIVE | Noted: 2017-02-16

## 2018-01-10 LAB
ALBUMIN SERPL-MCNC: 3.5 G/DL (ref 3.5–5.2)
ALBUMIN/GLOBULIN RATIO: ABNORMAL (ref 1–2.5)
ALP BLD-CCNC: 193 U/L (ref 40–129)
ALPHA-1 ANTITRYPSIN: 213 MG/DL (ref 90–200)
ALT SERPL-CCNC: 433 U/L (ref 5–41)
AST SERPL-CCNC: 270 U/L
BILIRUB SERPL-MCNC: 9.31 MG/DL (ref 0.3–1.2)
BILIRUBIN DIRECT: 7.56 MG/DL
BILIRUBIN, INDIRECT: 1.75 MG/DL (ref 0–1)
CERULOPLASMIN: 43 MG/DL (ref 15–30)
FERRITIN: 1034 UG/L (ref 30–400)
GGT: 350 U/L (ref 8–61)
GLOBULIN: ABNORMAL G/DL (ref 1.5–3.8)
HAV IGM SER IA-ACNC: NONREACTIVE
HEPATITIS B CORE IGM ANTIBODY: NONREACTIVE
HEPATITIS B SURFACE ANTIGEN: NONREACTIVE
HEPATITIS C ANTIBODY: NONREACTIVE
IRON SATURATION: 31 % (ref 20–55)
IRON: 96 UG/DL (ref 59–158)
SEDIMENTATION RATE, ERYTHROCYTE: 88 MM (ref 0–15)
TOTAL IRON BINDING CAPACITY: 306 UG/DL (ref 250–450)
TOTAL PROTEIN: 7.5 G/DL (ref 6.4–8.3)
TRANSFERRIN: 257 MG/DL (ref 200–360)
UNSATURATED IRON BINDING CAPACITY: 210 UG/DL (ref 112–347)

## 2018-01-10 PROCEDURE — 80074 ACUTE HEPATITIS PANEL: CPT

## 2018-01-10 PROCEDURE — 93971 EXTREMITY STUDY: CPT

## 2018-01-10 PROCEDURE — 82728 ASSAY OF FERRITIN: CPT

## 2018-01-10 PROCEDURE — 82103 ALPHA-1-ANTITRYPSIN TOTAL: CPT

## 2018-01-10 PROCEDURE — 83550 IRON BINDING TEST: CPT

## 2018-01-10 PROCEDURE — 84466 ASSAY OF TRANSFERRIN: CPT

## 2018-01-10 PROCEDURE — 83516 IMMUNOASSAY NONANTIBODY: CPT

## 2018-01-10 PROCEDURE — 83540 ASSAY OF IRON: CPT

## 2018-01-10 PROCEDURE — 82977 ASSAY OF GGT: CPT

## 2018-01-10 PROCEDURE — 85651 RBC SED RATE NONAUTOMATED: CPT

## 2018-01-10 PROCEDURE — 82390 ASSAY OF CERULOPLASMIN: CPT

## 2018-01-10 PROCEDURE — 80076 HEPATIC FUNCTION PANEL: CPT

## 2018-01-10 PROCEDURE — 36415 COLL VENOUS BLD VENIPUNCTURE: CPT

## 2018-01-10 PROCEDURE — 86038 ANTINUCLEAR ANTIBODIES: CPT

## 2018-01-11 LAB — ANTI-NUCLEAR ANTIBODY (ANA): NEGATIVE

## 2018-01-12 LAB
MITOCHONDRIAL ANTIBODY: 15 UNITS (ref 0–20)
SMOOTH MUSCLE ANTIBODY: 11 UNITS (ref 0–19)

## 2018-01-24 ENCOUNTER — HOSPITAL ENCOUNTER (OUTPATIENT)
Dept: CT IMAGING | Age: 63
Discharge: HOME | End: 2018-01-24
Payer: MEDICAID

## 2018-01-24 DIAGNOSIS — I71.21 ASCENDING AORTIC ANEURYSM: ICD-10-CM

## 2018-01-24 LAB
BUN BLDV-MCNC: 19 MG/DL (ref 8–23)
CREAT SERPL-MCNC: 0.77 MG/DL (ref 0.7–1.2)
GFR AFRICAN AMERICAN: >60 ML/MIN
GFR NON-AFRICAN AMERICAN: >60 ML/MIN
GFR SERPL CREATININE-BSD FRML MDRD: NORMAL ML/MIN/{1.73_M2}
GFR SERPL CREATININE-BSD FRML MDRD: NORMAL ML/MIN/{1.73_M2}

## 2018-01-24 PROCEDURE — 6360000004 HC RX CONTRAST MEDICATION: Performed by: NURSE PRACTITIONER

## 2018-01-24 PROCEDURE — 71275 CT ANGIOGRAPHY CHEST: CPT

## 2018-01-24 PROCEDURE — 82565 ASSAY OF CREATININE: CPT

## 2018-01-24 PROCEDURE — 2580000003 HC RX 258: Performed by: NURSE PRACTITIONER

## 2018-01-24 PROCEDURE — 36415 COLL VENOUS BLD VENIPUNCTURE: CPT

## 2018-01-24 PROCEDURE — 84520 ASSAY OF UREA NITROGEN: CPT

## 2018-01-24 RX ORDER — 0.9 % SODIUM CHLORIDE 0.9 %
100 INTRAVENOUS SOLUTION INTRAVENOUS ONCE
Status: COMPLETED | OUTPATIENT
Start: 2018-01-24 | End: 2018-01-24

## 2018-01-24 RX ORDER — SODIUM CHLORIDE 0.9 % (FLUSH) 0.9 %
10 SYRINGE (ML) INJECTION PRN
Status: DISCONTINUED | OUTPATIENT
Start: 2018-01-24 | End: 2018-01-27 | Stop reason: HOSPADM

## 2018-01-24 RX ADMIN — Medication 10 ML: at 10:17

## 2018-01-24 RX ADMIN — SODIUM CHLORIDE 100 ML: 9 INJECTION, SOLUTION INTRAVENOUS at 10:19

## 2018-01-24 RX ADMIN — IOPAMIDOL 100 ML: 755 INJECTION, SOLUTION INTRAVENOUS at 10:17

## 2018-01-25 ENCOUNTER — HOSPITAL ENCOUNTER (OUTPATIENT)
Age: 63
Discharge: HOME | End: 2018-01-25
Payer: MEDICAID

## 2018-01-25 ENCOUNTER — OFFICE VISIT (OUTPATIENT)
Dept: GASTROENTEROLOGY | Age: 63
End: 2018-01-25
Payer: MEDICAID

## 2018-01-25 VITALS
DIASTOLIC BLOOD PRESSURE: 62 MMHG | OXYGEN SATURATION: 98 % | SYSTOLIC BLOOD PRESSURE: 96 MMHG | WEIGHT: 242 LBS | BODY MASS INDEX: 38.89 KG/M2 | HEART RATE: 90 BPM | HEIGHT: 66 IN

## 2018-01-25 DIAGNOSIS — R74.8 ELEVATED LIVER ENZYMES: ICD-10-CM

## 2018-01-25 DIAGNOSIS — R74.8 ELEVATED ALKALINE PHOSPHATASE LEVEL: ICD-10-CM

## 2018-01-25 DIAGNOSIS — R82.2 BILIRUBIN IN URINE: Primary | ICD-10-CM

## 2018-01-25 DIAGNOSIS — R17 ELEVATED BILIRUBIN: ICD-10-CM

## 2018-01-25 DIAGNOSIS — R74.8 ELEVATED SERUM GAMMA-GLUTAMYL TRANSFERASE LEVEL: ICD-10-CM

## 2018-01-25 LAB
ALBUMIN SERPL-MCNC: 3.8 G/DL (ref 3.5–5.2)
ALBUMIN/GLOBULIN RATIO: ABNORMAL (ref 1–2.5)
ALP BLD-CCNC: 101 U/L (ref 40–129)
ALT SERPL-CCNC: 22 U/L (ref 5–41)
AST SERPL-CCNC: 23 U/L
BILIRUB SERPL-MCNC: 1.04 MG/DL (ref 0.3–1.2)
BILIRUBIN DIRECT: 0.45 MG/DL
BILIRUBIN, INDIRECT: 0.59 MG/DL (ref 0–1)
GLOBULIN: ABNORMAL G/DL (ref 1.5–3.8)
TOTAL PROTEIN: 8.3 G/DL (ref 6.4–8.3)

## 2018-01-25 PROCEDURE — 99204 OFFICE O/P NEW MOD 45 MIN: CPT | Performed by: INTERNAL MEDICINE

## 2018-01-25 PROCEDURE — G8484 FLU IMMUNIZE NO ADMIN: HCPCS | Performed by: INTERNAL MEDICINE

## 2018-01-25 PROCEDURE — 3017F COLORECTAL CA SCREEN DOC REV: CPT | Performed by: INTERNAL MEDICINE

## 2018-01-25 PROCEDURE — G8427 DOCREV CUR MEDS BY ELIG CLIN: HCPCS | Performed by: INTERNAL MEDICINE

## 2018-01-25 PROCEDURE — 36415 COLL VENOUS BLD VENIPUNCTURE: CPT

## 2018-01-25 PROCEDURE — 1036F TOBACCO NON-USER: CPT | Performed by: INTERNAL MEDICINE

## 2018-01-25 PROCEDURE — G8417 CALC BMI ABV UP PARAM F/U: HCPCS | Performed by: INTERNAL MEDICINE

## 2018-01-25 PROCEDURE — 80076 HEPATIC FUNCTION PANEL: CPT

## 2018-01-25 ASSESSMENT — ENCOUNTER SYMPTOMS
CHOKING: 0
SHORTNESS OF BREATH: 1
DIARRHEA: 0
COUGH: 1
BACK PAIN: 0
RECTAL PAIN: 0
BLOOD IN STOOL: 0
ANAL BLEEDING: 0
ABDOMINAL DISTENTION: 0
ABDOMINAL PAIN: 0
CONSTIPATION: 0
SORE THROAT: 0
VOMITING: 0
NAUSEA: 0
TROUBLE SWALLOWING: 0

## 2018-01-25 NOTE — PROGRESS NOTES
01/24/2018    LABALBU 3.5 01/10/2018    BILITOT 9.31 (H) 01/10/2018    ALKPHOS 193 (H) 01/10/2018     (H) 01/10/2018     (H) 01/10/2018    INR 1.0 12/21/2017         Lab Results   Component Value Date    RBC 3.87 (L) 01/08/2018    HGB 13.2 (L) 01/08/2018    MCV 98.9 01/08/2018    MCH 34.2 (H) 01/08/2018    MCHC 34.6 01/08/2018    RDW 12.4 01/08/2018    MPV 8.3 01/08/2018    BASOPCT 1 01/08/2018    LYMPHSABS 1.90 01/08/2018    MONOSABS 1.00 01/08/2018    NEUTROABS 7.90 01/08/2018    EOSABS 0.20 01/08/2018    BASOSABS 0.20 01/08/2018         DIAGNOSTIC TESTING:     Us Renal Complete    Result Date: 1/8/2018  EXAMINATION: RETROPERITONEAL ULTRASOUND OF THE KIDNEYS 1/8/2018 COMPARISON: 02/04/2014 CT abdomen HISTORY: ORDERING SYSTEM PROVIDED HISTORY: Gross hematuria TECHNOLOGIST PROVIDED HISTORY: Reason for exam:->Sumit blood in urine, hx of right renal complex cyst, had total knee replacement on 1-3-17, r/o acute process Acuity: Acute Type of Exam: Initial FINDINGS: Kidneys: The right kidney measures 12.7 cm in length and the left kidney measures 12 cm in length. Right renal cortex measures 1.5 cm. Left renal cortex measures 2.3 cm in thickness. Kidneys demonstrate normal cortical echogenicity. No evidence of hydronephrosis or intrarenal stones. There is a 3.2 cm cortical exophytic cyst in the lower pole of the right kidney. In addition, there is a 1.8 cm cyst in the superior portion of the right kidney on anteriorly. No hydronephrosis. Right renal cysts. Ct Abdomen Pelvis W Iv Contrast    Addendum Date: 1/9/2018    ADDENDUM: In the body of the report under the \"Organs\" section it states there are 3 left renal cysts. This is an error. The renal cysts are identified in the right kidney as stated in the Impression section.      Result Date: 1/9/2018  EXAMINATION: CT OF THE ABDOMEN AND PELVIS WITH CONTRAST 1/8/2018 10:51 pm TECHNIQUE: CT of the abdomen and pelvis was performed with the

## 2018-01-30 ENCOUNTER — OFFICE VISIT (OUTPATIENT)
Dept: CARDIOTHORACIC SURGERY | Age: 63
End: 2018-01-30
Payer: MEDICAID

## 2018-01-30 VITALS
WEIGHT: 243 LBS | RESPIRATION RATE: 18 BRPM | TEMPERATURE: 97.8 F | DIASTOLIC BLOOD PRESSURE: 85 MMHG | OXYGEN SATURATION: 98 % | HEART RATE: 66 BPM | SYSTOLIC BLOOD PRESSURE: 132 MMHG | HEIGHT: 65 IN | BODY MASS INDEX: 40.48 KG/M2

## 2018-01-30 DIAGNOSIS — I71.21 ASCENDING AORTIC ANEURYSM: Primary | ICD-10-CM

## 2018-01-30 PROCEDURE — 3017F COLORECTAL CA SCREEN DOC REV: CPT | Performed by: THORACIC SURGERY (CARDIOTHORACIC VASCULAR SURGERY)

## 2018-01-30 PROCEDURE — 99212 OFFICE O/P EST SF 10 MIN: CPT | Performed by: NURSE PRACTITIONER

## 2018-01-30 PROCEDURE — 1036F TOBACCO NON-USER: CPT | Performed by: THORACIC SURGERY (CARDIOTHORACIC VASCULAR SURGERY)

## 2018-01-30 PROCEDURE — G8427 DOCREV CUR MEDS BY ELIG CLIN: HCPCS | Performed by: THORACIC SURGERY (CARDIOTHORACIC VASCULAR SURGERY)

## 2018-01-30 PROCEDURE — G8484 FLU IMMUNIZE NO ADMIN: HCPCS | Performed by: THORACIC SURGERY (CARDIOTHORACIC VASCULAR SURGERY)

## 2018-01-30 PROCEDURE — G8417 CALC BMI ABV UP PARAM F/U: HCPCS | Performed by: THORACIC SURGERY (CARDIOTHORACIC VASCULAR SURGERY)

## 2018-01-30 NOTE — PROGRESS NOTES
[facility]  Office Visit      Subjective:  Mr. Liane Dominique is a 58 y.o. male following up after initial consult for Ascending aortic aneurysm. He had his follow up CTA today, last was in July, 2017. he feels well today. Denies chest pain or shortness of breath. Plan of care reviewed and questions answered. Physical Exam  Vitals:  Vitals:    01/30/18 1007   BP: 132/85   Pulse:    Resp:    Temp:    SpO2:        General: Alert and Oriented x3. Ambulatory. No apparent distress. HEENT:  Normocephalic and atraumatic. Neck:  Supple. Trachea midline. Chest:  No abnormality. Equal and symmetric expansion with respiration. Lungs:  Clear to auscultation. Cardiac:  Regular rate and rhythm without murmurs, rubs or gallops. Abdomen:  Soft, non-tender, normoactive bowel sounds. No masses or organomegaly. Extremities:  No edema. Intact pulses in all four extremities. Musculoskeletal:  Intact range of motion of peripheral joints. Normal muscular strength. Psychiatric: Mood and affect are appropriate.       Current Medications:    Current Outpatient Prescriptions:     losartan (COZAAR) 100 MG tablet, take 1 tablet by mouth once daily, Disp: 30 tablet, Rfl: 3    hydrochlorothiazide (MICROZIDE) 12.5 MG capsule, take 1 capsule by mouth once daily, Disp: 30 capsule, Rfl: 3    amLODIPine (NORVASC) 10 MG tablet, Take 1 tablet by mouth daily, Disp: 30 tablet, Rfl: 3    cetirizine (ZYRTEC) 10 MG tablet, Take 1 tablet by mouth nightly as needed for Allergies or Rhinitis, Disp: 30 tablet, Rfl: 1    oxyCODONE-acetaminophen (PERCOCET) 5-325 MG per tablet, Take 1 tablet by mouth every 4 hours as needed for Pain., Disp: , Rfl:     albuterol sulfate HFA (PROAIR HFA) 108 (90 BASE) MCG/ACT inhaler, Inhale 1-2 puffs into the lungs every 6 hours as needed for Wheezing or Shortness of Breath, Disp: 1 Inhaler, Rfl: 1    fluticasone (FLONASE) 50 MCG/ACT nasal spray, 1-2 sprays by Nasal route daily as needed

## 2018-02-01 ENCOUNTER — OFFICE VISIT (OUTPATIENT)
Dept: UROLOGY | Age: 63
End: 2018-02-01
Payer: MEDICAID

## 2018-02-01 VITALS
HEIGHT: 65 IN | HEART RATE: 79 BPM | BODY MASS INDEX: 41.29 KG/M2 | DIASTOLIC BLOOD PRESSURE: 81 MMHG | WEIGHT: 247.8 LBS | SYSTOLIC BLOOD PRESSURE: 121 MMHG

## 2018-02-01 DIAGNOSIS — R31.29 MICROSCOPIC HEMATURIA: ICD-10-CM

## 2018-02-01 DIAGNOSIS — Q61.02 MULTIPLE RENAL CYSTS: Primary | ICD-10-CM

## 2018-02-01 PROBLEM — N28.1 COMPLEX RENAL CYST: Status: RESOLVED | Noted: 2018-01-08 | Resolved: 2018-02-01

## 2018-02-01 LAB
BILIRUBIN, POC: ABNORMAL
BLOOD URINE, POC: ABNORMAL
CLARITY, POC: ABNORMAL
COLOR, POC: YELLOW
GLUCOSE URINE, POC: NEGATIVE
KETONES, POC: NEGATIVE
LEUKOCYTE EST, POC: NEGATIVE
NITRITE, POC: NEGATIVE
PH, POC: 5.5
PROTEIN, POC: 30
SPECIFIC GRAVITY, POC: 1.03
UROBILINOGEN, POC: 1

## 2018-02-01 PROCEDURE — 3017F COLORECTAL CA SCREEN DOC REV: CPT | Performed by: SPECIALIST

## 2018-02-01 PROCEDURE — G8484 FLU IMMUNIZE NO ADMIN: HCPCS | Performed by: SPECIALIST

## 2018-02-01 PROCEDURE — 81002 URINALYSIS NONAUTO W/O SCOPE: CPT | Performed by: SPECIALIST

## 2018-02-01 PROCEDURE — G8427 DOCREV CUR MEDS BY ELIG CLIN: HCPCS | Performed by: SPECIALIST

## 2018-02-01 PROCEDURE — 99245 OFF/OP CONSLTJ NEW/EST HI 55: CPT | Performed by: SPECIALIST

## 2018-02-01 PROCEDURE — G8417 CALC BMI ABV UP PARAM F/U: HCPCS | Performed by: SPECIALIST

## 2018-02-01 NOTE — PATIENT INSTRUCTIONS
OPERATION    PAIN  It is normal to have pain after the operation. It will be most severe in the area where the surgery was done. This is caused by inflammation and will improve slowly. USE OF NARCOTIC PAIN MEDICATION  Narcotic pain medication will be available to help with your pain after your surgery. Narcotics are the most effective drugs known for pain relief. They have other effects besides pain relief. These other effects are called side effects. They include sleepiness, nausea, flushing, sweating, euphoria (a high, intoxicated or confused feeling), and possibly constipation. If you find any of these side effects particularly bothersome, you may ask the nurse to give you one-half the specified dose. You will have to strike a compromise between the side effects and your need for pain relief by adjusting the dose. DIET/FLUIDS  Your diet will be based on the type of surgery you had. The intravenous (IV) line will likely be in place following your operation and can be used to administer fluids, pain medication, and antibiotics. ELIMINATION  A urinary catheter may be in place to drain urine from your bladder for the first one (1) or two (2) days. Following removal of the catheter, some people experience difficulty urinating. Catheterization, the passage of a sterile catheter (tube) into your bladder, may be necessary at intervals to drain urine. For the first few days after surgery, you may be slightly constipated. Stool softeners and laxatives may be ordered for your use. If you feel you need such medication, request it form your nurse. Drinking ample fluids and eating foods high in fiber, such as whole grains and raw fruits and vegetables, may also help you regain your normal bowel habits. PULMONARY (LUNG) HYGIENE  It is very important to breathe deeply and fully expand your lungs after surgery to prevent complications.   Several times an hour  take a deep breath and blow out the air slowly through your mouth. In addition, a special breathing device, called an incentive spirometer, may be provided for you. This device helps you to fully expand your lungs. Use your incentive spirometer on your own, several times an hour. Take this breathing device home with you and use it regularly for the first week. SMOKING IS DICOURAGED in the week(s) before and after surgery. Smoking increases the risk of lung complications after surgery and can cause fever, prolonging your hospitalization. Scientific evidence shows that individuals who smoke often have a slower and less favorable recovery. Smoking has also been shown to severely reduce the rate of bone healing. ACTIVITY  Feel free to move about in bed and rest in any position that is comfortable. Frequently changing your position will help to relieve discomfort after surgery. The nurse will assist you to get out of bed and walk in the hallways within one to two days after your surgery. Getting out of bed and walking is very important to help prevent complications such as pneumonia, blood clots, and muscle stiffness. HOME RECOVERY    FOLLOW-UP APPOINTMENT  If your surgical staples/sutures were not removed before you left the hospital, and if an appointment has not been prearranged, please call our office at [phone] to arrange an appointment. Staples or stitches are generally removed seven to ten days after surgery. They may be left in longer if you have had prior surgery in the same area. HYGIENE  Unless otherwise instructed, you may shower daily. While showering, do not rub over the incision. Pat the incisional area dry and do not apply creams or ointments to your incision. Do not take a tub bath for two weeks. To allow for greater comfort and prevent friction on your incision, place an individually-wrapped (sterile) 4 x 4 dressing over your incision and secure loosely with a small amount of tape.   Change this dressing daily. INFLAMMATION  Take your temperature with a thermometer every afternoon for the first week you are at home. Call  the doctor if:   Your temperature, taken by thermometer, is 101? F or above.  Your incision becomes increasingly red, swollen, or any drainage occurs. After 4:30 PM and on weekends, the surgeon may be reached by calling     NUTRITION  A well-balanced diet is necessary for good healing. This includes food from the basic four food groups:  dairy products, meat, vegetables, and fruit. Since you will be less active during your recovery period, avoid foods that are high in calories and low in nutritional value. PAIN AND MEDICATION  After you are home, it is quite common to have continued pain. These discomforts represent a temporary dysfunction of the involved tissues and will usually pass in time. Daily walking is encouraged and helps to lessen discomfort. As activities are increased, you may experience additional aching in the incisional area. This is due to stretching of scar tissue. This need not cause alarm and does not require any curtailment of activities. Aching of this type may last one to two months longer. You should gradually use less pain medication during your recovery period at home. Narcotic medication may be taken as outlined on your prescription. More frequent use of narcotic medication is not advised, and your prescription will not be considered for renewal early. Please avoid the use of additional Tylenol (acetaminophen) while taking your prescription pain medication as it contains Tylenol. Due to the nature of your surgery you may need to avoid anti-inflammatory medications (Motrin, Ibuprofen, Advil, Aleve, etc.) for a period of 3 months from the date of surgery. There is literature that suggests these medications may slow new bone formation.     Do not drive or operate other potentially dangerous machinery, power tools, or household medication. Also, stop taking any herbal or dietary supplements, like Yajairas wort, Vitamin E, 7-10 days before surgery as these can increase bleeding during surgery. Double check your over-the-counter medications, especially cold medications and pain medications often have NSAIDS. If you have any questions about your medication, check with your pharmacist, family doctor or call the office at [phone] opt [phone] to speak with our nurse/MA. NOTE:  You may continue any Tylenol (acetaminophen) medications, plain or in combination with other medications. These include:  Extra strength Tylenol, Tylenol #3, Vicodin, Lorcet, Percocet and over-the-counter cold medications, etc.    Please note:  Percocet does Not have aspirin so it is okay to continue this medication.

## 2018-02-01 NOTE — PROGRESS NOTES
and radiology report verified)    PAST MEDICAL, FAMILY AND SOCIAL HISTORY:  Past Medical History:   Diagnosis Date    Asthma     Epilepsia [facility])     last seizure known was when he was 32years old    Hematuria 6/30/2015    Hypertension     Osteoarthritis      Past Surgical History:   Procedure Laterality Date    COLONOSCOPY  07/10/2015    DR NEELIMA GALARZA - diverticula    HERNIA REPAIR      abdomen    JOINT REPLACEMENT Left 01/03/2018    TOTAL KNEE ARTHROPLASTY  11/02/2016    right     Family History   Problem Relation Age of Onset    Heart Disease Father      chf    Cancer Sister      Brain Cancer     Outpatient Prescriptions Marked as Taking for the 2/1/18 encounter (Office Visit) with Riya Loja MD   Medication Sig Dispense Refill    losartan (COZAAR) 100 MG tablet take 1 tablet by mouth once daily 30 tablet 3    hydrochlorothiazide (MICROZIDE) 12.5 MG capsule take 1 capsule by mouth once daily 30 capsule 3    amLODIPine (NORVASC) 10 MG tablet Take 1 tablet by mouth daily 30 tablet 3    cetirizine (ZYRTEC) 10 MG tablet Take 1 tablet by mouth nightly as needed for Allergies or Rhinitis 30 tablet 1    oxyCODONE-acetaminophen (PERCOCET) 5-325 MG per tablet Take 1 tablet by mouth every 4 hours as needed for Pain.       albuterol sulfate HFA (PROAIR HFA) 108 (90 BASE) MCG/ACT inhaler Inhale 1-2 puffs into the lungs every 6 hours as needed for Wheezing or Shortness of Breath 1 Inhaler 1    fluticasone (FLONASE) 50 MCG/ACT nasal spray 1-2 sprays by Nasal route daily as needed for Rhinitis or Allergies 1 Bottle 1    Handicap Placard MISC by Does not apply route Dx: OA, bilat knee pain    Expires 5 yrs from date 1 each 0    ipratropium-albuterol (DUONEB) 0.5-2.5 (3) MG/3ML SOLN nebulizer solution Inhale 3 mLs into the lungs 4 times daily 360 mL 3    albuterol (PROVENTIL) (2.5 MG/3ML) 0.083% nebulizer solution Take 3 mLs by nebulization every 6 hours as needed for Wheezing 120 each 1

## 2018-02-13 ENCOUNTER — OFFICE VISIT (OUTPATIENT)
Dept: GASTROENTEROLOGY | Age: 63
End: 2018-02-13
Payer: MEDICAID

## 2018-02-13 ENCOUNTER — HOSPITAL ENCOUNTER (OUTPATIENT)
Age: 63
Discharge: HOME | End: 2018-02-13
Payer: MEDICAID

## 2018-02-13 VITALS
SYSTOLIC BLOOD PRESSURE: 120 MMHG | WEIGHT: 242 LBS | HEIGHT: 65 IN | DIASTOLIC BLOOD PRESSURE: 88 MMHG | HEART RATE: 84 BPM | BODY MASS INDEX: 40.32 KG/M2

## 2018-02-13 DIAGNOSIS — R74.8 ELEVATED LIVER ENZYMES: ICD-10-CM

## 2018-02-13 DIAGNOSIS — Z86.19 HISTORY OF ACUTE HEPATITIS: ICD-10-CM

## 2018-02-13 DIAGNOSIS — R74.8 ELEVATED LIVER ENZYMES: Primary | ICD-10-CM

## 2018-02-13 LAB
ALBUMIN SERPL-MCNC: 3.9 G/DL (ref 3.5–5.2)
ALBUMIN/GLOBULIN RATIO: NORMAL (ref 1–2.5)
ALP BLD-CCNC: 70 U/L (ref 40–129)
ALT SERPL-CCNC: 14 U/L (ref 5–41)
AST SERPL-CCNC: 20 U/L
BILIRUB SERPL-MCNC: 0.57 MG/DL (ref 0.3–1.2)
BILIRUBIN DIRECT: 0.22 MG/DL
BILIRUBIN, INDIRECT: 0.35 MG/DL (ref 0–1)
GLOBULIN: NORMAL G/DL (ref 1.5–3.8)
TOTAL PROTEIN: 7.9 G/DL (ref 6.4–8.3)

## 2018-02-13 PROCEDURE — G8427 DOCREV CUR MEDS BY ELIG CLIN: HCPCS | Performed by: INTERNAL MEDICINE

## 2018-02-13 PROCEDURE — 3017F COLORECTAL CA SCREEN DOC REV: CPT | Performed by: INTERNAL MEDICINE

## 2018-02-13 PROCEDURE — G8484 FLU IMMUNIZE NO ADMIN: HCPCS | Performed by: INTERNAL MEDICINE

## 2018-02-13 PROCEDURE — 1036F TOBACCO NON-USER: CPT | Performed by: INTERNAL MEDICINE

## 2018-02-13 PROCEDURE — G8417 CALC BMI ABV UP PARAM F/U: HCPCS | Performed by: INTERNAL MEDICINE

## 2018-02-13 PROCEDURE — 99213 OFFICE O/P EST LOW 20 MIN: CPT | Performed by: INTERNAL MEDICINE

## 2018-02-13 PROCEDURE — 80076 HEPATIC FUNCTION PANEL: CPT

## 2018-02-13 PROCEDURE — 36415 COLL VENOUS BLD VENIPUNCTURE: CPT

## 2018-02-13 ASSESSMENT — ENCOUNTER SYMPTOMS
CONSTIPATION: 0
ABDOMINAL PAIN: 0
DIARRHEA: 0
COUGH: 1
SORE THROAT: 0
TROUBLE SWALLOWING: 0
ANAL BLEEDING: 0
NAUSEA: 0
BLOOD IN STOOL: 0
VOMITING: 0
RECTAL PAIN: 0
SHORTNESS OF BREATH: 0
CHOKING: 0
BACK PAIN: 0
ABDOMINAL DISTENTION: 0

## 2018-02-13 NOTE — PROGRESS NOTES
by Nasal route daily as needed for Rhinitis or Allergies, Disp: 1 Bottle, Rfl: 1    Handicap Placard [facility], by Does not apply route Dx: OA, bilat knee pain  Expires 5 yrs from date, Disp: 1 each, Rfl: 0    ipratropium-albuterol (DUONEB) 0.5-2.5 (3) MG/3ML SOLN nebulizer solution, Inhale 3 mLs into the lungs 4 times daily, Disp: 360 mL, Rfl: 3    albuterol (PROVENTIL) (2.5 MG/3ML) 0.083% nebulizer solution, Take 3 mLs by nebulization every 6 hours as needed for Wheezing, Disp: 120 each, Rfl: 1    ALLERGIES:   Allergies   Allergen Reactions    Mobic [Meloxicam]      Patient was hospitalized after taking this medication. SOCIAL HISTORY:   Social History     Social History    Marital status:      Spouse name: N/A    Number of children: N/A    Years of education: N/A     Occupational History    retired None     Social History Main Topics    Smoking status: Never Smoker    Smokeless tobacco: Never Used    Alcohol use No    Drug use: No    Sexual activity: No     Other Topics Concern    Not on file     Social History Narrative    No narrative on file       REVIEW OF SYSTEMS: A 12-point review of systems was obtained and pertinent positives and negatives were enumerated above in the history of present illness. All other reviewed systems / symptoms were negative. Review of Systems   Constitutional: Negative for chills, diaphoresis, fatigue, fever and unexpected weight change. HENT: Positive for congestion. Negative for sore throat and trouble swallowing. Eyes: Positive for visual disturbance. Respiratory: Positive for cough. Negative for choking and shortness of breath. Cardiovascular: Negative for chest pain. Gastrointestinal: Negative for abdominal distention, abdominal pain, anal bleeding, blood in stool, constipation, diarrhea, nausea, rectal pain and vomiting. Endocrine: Negative. Genitourinary: Negative for difficulty urinating and dysuria.    Musculoskeletal: Positive for or pneumothorax. Central airways unremarkable. Upper Abdomen: Limited images of the upper abdomen show right renal cysts 2.6 cm. Soft Tissues/Bones: No acute bone or soft tissue abnormality. 1. Mild ectasia of the ascending thoracic aorta measuring 4.1 cm at level of right pulmonary artery not significantly changed from prior of 4.3 cm. 2. No acute process in the lungs. Number a 2.6 cm right renal cyst.          IMPRESSION: Mr. Christy Mccormack is a 61 y.o. male with elevated LFTs. Very likely drug-induced liver injury. Recheck LFTs today. Avoid Bactrim and Mobic. Peri Bennett MD [facility]      Please note that this chart was generated using voice recognition Dragon dictation software. Although every effort was made to ensure the accuracy of this automated transcription, some errors in transcription may have occurred.

## 2018-02-14 ENCOUNTER — TELEPHONE (OUTPATIENT)
Dept: UROLOGY | Age: 63
End: 2018-02-14

## 2018-02-20 ENCOUNTER — HOSPITAL ENCOUNTER (OUTPATIENT)
Dept: GENERAL RADIOLOGY | Age: 63
Discharge: HOME | End: 2018-02-22
Payer: MEDICAID

## 2018-02-20 DIAGNOSIS — R31.29 MICROHEMATURIA: ICD-10-CM

## 2019-10-16 ENCOUNTER — OFFICE VISIT (OUTPATIENT)
Dept: FAMILY MEDICINE CLINIC | Age: 64
End: 2019-10-16
Payer: MEDICAID

## 2019-10-16 VITALS
DIASTOLIC BLOOD PRESSURE: 82 MMHG | HEART RATE: 56 BPM | BODY MASS INDEX: 39.44 KG/M2 | OXYGEN SATURATION: 96 % | HEIGHT: 64 IN | TEMPERATURE: 97.7 F | WEIGHT: 231 LBS | SYSTOLIC BLOOD PRESSURE: 134 MMHG

## 2019-10-16 DIAGNOSIS — D64.9 ANEMIA, UNSPECIFIED TYPE: ICD-10-CM

## 2019-10-16 DIAGNOSIS — F11.21 HISTORY OF NARCOTIC ADDICTION (HCC): ICD-10-CM

## 2019-10-16 DIAGNOSIS — I77.810 ASCENDING AORTA DILATATION (HCC): ICD-10-CM

## 2019-10-16 DIAGNOSIS — J30.2 CHRONIC SEASONAL ALLERGIC RHINITIS: ICD-10-CM

## 2019-10-16 DIAGNOSIS — J44.9 CHRONIC OBSTRUCTIVE PULMONARY DISEASE, UNSPECIFIED COPD TYPE (HCC): ICD-10-CM

## 2019-10-16 DIAGNOSIS — R74.8 ELEVATED ALKALINE PHOSPHATASE LEVEL: ICD-10-CM

## 2019-10-16 DIAGNOSIS — R31.29 MICROSCOPIC HEMATURIA: ICD-10-CM

## 2019-10-16 DIAGNOSIS — E55.9 VITAMIN D DEFICIENCY: ICD-10-CM

## 2019-10-16 DIAGNOSIS — Z00.00 PREVENTATIVE HEALTH CARE: ICD-10-CM

## 2019-10-16 DIAGNOSIS — Z23 FLU VACCINE NEED: ICD-10-CM

## 2019-10-16 DIAGNOSIS — R74.8 ELEVATED LIVER ENZYMES: ICD-10-CM

## 2019-10-16 DIAGNOSIS — I10 ESSENTIAL HYPERTENSION: Primary | ICD-10-CM

## 2019-10-16 DIAGNOSIS — J45.20 MILD INTERMITTENT ASTHMA WITHOUT COMPLICATION: ICD-10-CM

## 2019-10-16 DIAGNOSIS — Q61.02 MULTIPLE RENAL CYSTS: ICD-10-CM

## 2019-10-16 DIAGNOSIS — R73.9 HYPERGLYCEMIA: ICD-10-CM

## 2019-10-16 PROBLEM — H53.9 VISUAL DISTURBANCE: Status: RESOLVED | Noted: 2017-10-02 | Resolved: 2019-10-16

## 2019-10-16 PROBLEM — M51.34 THORACIC DEGENERATIVE DISC DISEASE: Status: RESOLVED | Noted: 2017-12-21 | Resolved: 2019-10-16

## 2019-10-16 PROBLEM — R82.2 BILIRUBIN IN URINE: Status: RESOLVED | Noted: 2018-01-08 | Resolved: 2019-10-16

## 2019-10-16 PROBLEM — G89.29 CHRONIC PAIN OF LEFT KNEE: Status: RESOLVED | Noted: 2017-07-28 | Resolved: 2019-10-16

## 2019-10-16 PROBLEM — R79.89 ELEVATED FERRITIN: Status: RESOLVED | Noted: 2018-01-10 | Resolved: 2019-10-16

## 2019-10-16 PROBLEM — R82.998 LEUKOCYTES IN URINE: Status: RESOLVED | Noted: 2018-01-08 | Resolved: 2019-10-16

## 2019-10-16 PROBLEM — R73.09 ELEVATED GLUCOSE: Status: RESOLVED | Noted: 2018-01-08 | Resolved: 2019-10-16

## 2019-10-16 PROBLEM — R51.9 GENERALIZED HEADACHES: Status: RESOLVED | Noted: 2017-12-21 | Resolved: 2019-10-16

## 2019-10-16 PROBLEM — R09.89 CHEST CONGESTION: Status: RESOLVED | Noted: 2017-05-16 | Resolved: 2019-10-16

## 2019-10-16 PROBLEM — R70.0 ELEVATED SED RATE: Status: RESOLVED | Noted: 2018-01-10 | Resolved: 2019-10-16

## 2019-10-16 PROBLEM — Z80.8 FAMILY HISTORY OF BRAIN CANCER: Status: RESOLVED | Noted: 2017-10-02 | Resolved: 2019-10-16

## 2019-10-16 PROBLEM — Z98.890 STATUS POST LEFT KNEE SURGERY: Status: RESOLVED | Noted: 2018-01-08 | Resolved: 2019-10-16

## 2019-10-16 PROBLEM — Z86.19 HISTORY OF ACUTE HEPATITIS: Status: RESOLVED | Noted: 2018-02-13 | Resolved: 2019-10-16

## 2019-10-16 PROBLEM — M25.562 CHRONIC PAIN OF LEFT KNEE: Status: RESOLVED | Noted: 2017-07-28 | Resolved: 2019-10-16

## 2019-10-16 PROBLEM — R17 ELEVATED BILIRUBIN: Status: RESOLVED | Noted: 2018-01-25 | Resolved: 2019-10-16

## 2019-10-16 PROBLEM — I77.1 TORTUOUS AORTA (HCC): Status: RESOLVED | Noted: 2017-05-16 | Resolved: 2019-10-16

## 2019-10-16 PROBLEM — R07.9 CHEST PAIN: Status: RESOLVED | Noted: 2017-10-02 | Resolved: 2019-10-16

## 2019-10-16 PROBLEM — R91.8 GROUND GLASS OPACITY PRESENT ON IMAGING OF LUNG: Status: RESOLVED | Noted: 2017-07-10 | Resolved: 2019-10-16

## 2019-10-16 PROBLEM — R17 SERUM TOTAL BILIRUBIN ELEVATED: Status: RESOLVED | Noted: 2018-01-08 | Resolved: 2019-10-16

## 2019-10-16 PROBLEM — R81 GLUCOSE FOUND IN URINE ON EXAMINATION: Status: RESOLVED | Noted: 2018-01-08 | Resolved: 2019-10-16

## 2019-10-16 PROBLEM — M79.89 LEFT LEG SWELLING: Status: RESOLVED | Noted: 2018-01-08 | Resolved: 2019-10-16

## 2019-10-16 PROBLEM — R39.89 URINE DISCOLORATION: Status: RESOLVED | Noted: 2018-01-08 | Resolved: 2019-10-16

## 2019-10-16 PROBLEM — Z96.659 HISTORY OF TOTAL KNEE REPLACEMENT: Chronic | Status: RESOLVED | Noted: 2017-02-16 | Resolved: 2019-10-16

## 2019-10-16 PROBLEM — R31.0 GROSS HEMATURIA: Status: RESOLVED | Noted: 2018-01-08 | Resolved: 2019-10-16

## 2019-10-16 PROCEDURE — G8427 DOCREV CUR MEDS BY ELIG CLIN: HCPCS | Performed by: NURSE PRACTITIONER

## 2019-10-16 PROCEDURE — G8417 CALC BMI ABV UP PARAM F/U: HCPCS | Performed by: NURSE PRACTITIONER

## 2019-10-16 PROCEDURE — 3017F COLORECTAL CA SCREEN DOC REV: CPT | Performed by: NURSE PRACTITIONER

## 2019-10-16 PROCEDURE — 3023F SPIROM DOC REV: CPT | Performed by: NURSE PRACTITIONER

## 2019-10-16 PROCEDURE — 99214 OFFICE O/P EST MOD 30 MIN: CPT | Performed by: NURSE PRACTITIONER

## 2019-10-16 PROCEDURE — G8926 SPIRO NO PERF OR DOC: HCPCS | Performed by: NURSE PRACTITIONER

## 2019-10-16 PROCEDURE — 1036F TOBACCO NON-USER: CPT | Performed by: NURSE PRACTITIONER

## 2019-10-16 PROCEDURE — 90471 IMMUNIZATION ADMIN: CPT | Performed by: NURSE PRACTITIONER

## 2019-10-16 PROCEDURE — G8482 FLU IMMUNIZE ORDER/ADMIN: HCPCS | Performed by: NURSE PRACTITIONER

## 2019-10-16 PROCEDURE — 90686 IIV4 VACC NO PRSV 0.5 ML IM: CPT | Performed by: NURSE PRACTITIONER

## 2019-10-16 RX ORDER — ALBUTEROL SULFATE 2.5 MG/3ML
2.5 SOLUTION RESPIRATORY (INHALATION) EVERY 6 HOURS PRN
Qty: 120 EACH | Refills: 1 | Status: CANCELLED | OUTPATIENT
Start: 2019-10-16

## 2019-10-16 RX ORDER — LOSARTAN POTASSIUM 100 MG/1
TABLET ORAL
Qty: 30 TABLET | Refills: 3 | Status: CANCELLED | OUTPATIENT
Start: 2019-10-16

## 2019-10-16 RX ORDER — ASPIRIN 81 MG/1
81 TABLET ORAL DAILY
Qty: 90 TABLET | Refills: 1 | Status: SHIPPED | OUTPATIENT
Start: 2019-10-16 | End: 2019-12-31 | Stop reason: SDUPTHER

## 2019-10-16 RX ORDER — FLUTICASONE PROPIONATE 50 MCG
1-2 SPRAY, SUSPENSION (ML) NASAL DAILY PRN
Qty: 3 BOTTLE | Refills: 1 | Status: SHIPPED | OUTPATIENT
Start: 2019-10-16 | End: 2021-01-25 | Stop reason: SDUPTHER

## 2019-10-16 RX ORDER — ALBUTEROL SULFATE 90 UG/1
2 AEROSOL, METERED RESPIRATORY (INHALATION) EVERY 4 HOURS PRN
Qty: 3 INHALER | Refills: 1 | Status: SHIPPED | OUTPATIENT
Start: 2019-10-16 | End: 2021-01-25 | Stop reason: SDUPTHER

## 2019-10-16 RX ORDER — IPRATROPIUM BROMIDE AND ALBUTEROL SULFATE 2.5; .5 MG/3ML; MG/3ML
1 SOLUTION RESPIRATORY (INHALATION) 4 TIMES DAILY
Qty: 360 ML | Refills: 3 | Status: CANCELLED | OUTPATIENT
Start: 2019-10-16

## 2019-10-16 RX ORDER — CETIRIZINE HYDROCHLORIDE 10 MG/1
10 TABLET ORAL NIGHTLY PRN
Qty: 90 TABLET | Refills: 1 | Status: SHIPPED | OUTPATIENT
Start: 2019-10-16 | End: 2019-12-31 | Stop reason: SDUPTHER

## 2019-10-16 RX ORDER — HYDROCHLOROTHIAZIDE 12.5 MG/1
CAPSULE, GELATIN COATED ORAL
Qty: 30 CAPSULE | Refills: 3 | Status: CANCELLED | OUTPATIENT
Start: 2019-10-16

## 2019-10-16 RX ORDER — AMLODIPINE BESYLATE 10 MG/1
10 TABLET ORAL DAILY
Qty: 30 TABLET | Refills: 3 | Status: CANCELLED | OUTPATIENT
Start: 2019-10-16

## 2019-10-16 RX ORDER — AMLODIPINE BESYLATE 5 MG/1
5 TABLET ORAL DAILY
Qty: 90 TABLET | Refills: 1 | Status: SHIPPED | OUTPATIENT
Start: 2019-10-16 | End: 2020-05-15

## 2019-10-16 ASSESSMENT — ENCOUNTER SYMPTOMS
RHINORRHEA: 0
SORE THROAT: 0
GASTROINTESTINAL NEGATIVE: 1
COUGH: 0
SINUS PAIN: 0
EYE DISCHARGE: 1
SHORTNESS OF BREATH: 1
WHEEZING: 1
SINUS PRESSURE: 0

## 2019-10-16 ASSESSMENT — PATIENT HEALTH QUESTIONNAIRE - PHQ9
SUM OF ALL RESPONSES TO PHQ QUESTIONS 1-9: 0
2. FEELING DOWN, DEPRESSED OR HOPELESS: 0
SUM OF ALL RESPONSES TO PHQ QUESTIONS 1-9: 0
SUM OF ALL RESPONSES TO PHQ9 QUESTIONS 1 & 2: 0
1. LITTLE INTEREST OR PLEASURE IN DOING THINGS: 0

## 2019-10-30 ENCOUNTER — OFFICE VISIT (OUTPATIENT)
Dept: FAMILY MEDICINE CLINIC | Age: 64
End: 2019-10-30
Payer: MEDICAID

## 2019-10-30 VITALS
WEIGHT: 229 LBS | OXYGEN SATURATION: 97 % | RESPIRATION RATE: 16 BRPM | BODY MASS INDEX: 39.93 KG/M2 | TEMPERATURE: 97.3 F | DIASTOLIC BLOOD PRESSURE: 80 MMHG | SYSTOLIC BLOOD PRESSURE: 128 MMHG | HEART RATE: 65 BPM

## 2019-10-30 DIAGNOSIS — R10.32 LEFT GROIN PAIN: ICD-10-CM

## 2019-10-30 DIAGNOSIS — J44.9 CHRONIC OBSTRUCTIVE PULMONARY DISEASE, UNSPECIFIED COPD TYPE (HCC): ICD-10-CM

## 2019-10-30 DIAGNOSIS — I10 ESSENTIAL HYPERTENSION: Primary | ICD-10-CM

## 2019-10-30 PROCEDURE — 3017F COLORECTAL CA SCREEN DOC REV: CPT | Performed by: NURSE PRACTITIONER

## 2019-10-30 PROCEDURE — G8926 SPIRO NO PERF OR DOC: HCPCS | Performed by: NURSE PRACTITIONER

## 2019-10-30 PROCEDURE — G8417 CALC BMI ABV UP PARAM F/U: HCPCS | Performed by: NURSE PRACTITIONER

## 2019-10-30 PROCEDURE — 3023F SPIROM DOC REV: CPT | Performed by: NURSE PRACTITIONER

## 2019-10-30 PROCEDURE — G8482 FLU IMMUNIZE ORDER/ADMIN: HCPCS | Performed by: NURSE PRACTITIONER

## 2019-10-30 PROCEDURE — G8427 DOCREV CUR MEDS BY ELIG CLIN: HCPCS | Performed by: NURSE PRACTITIONER

## 2019-10-30 PROCEDURE — 1036F TOBACCO NON-USER: CPT | Performed by: NURSE PRACTITIONER

## 2019-10-30 PROCEDURE — 99213 OFFICE O/P EST LOW 20 MIN: CPT | Performed by: NURSE PRACTITIONER

## 2019-10-30 ASSESSMENT — ENCOUNTER SYMPTOMS
SHORTNESS OF BREATH: 0
ABDOMINAL PAIN: 1

## 2019-12-31 ENCOUNTER — OFFICE VISIT (OUTPATIENT)
Dept: FAMILY MEDICINE CLINIC | Age: 64
End: 2019-12-31
Payer: MEDICAID

## 2019-12-31 VITALS
DIASTOLIC BLOOD PRESSURE: 90 MMHG | OXYGEN SATURATION: 97 % | TEMPERATURE: 96.5 F | BODY MASS INDEX: 41.15 KG/M2 | WEIGHT: 236 LBS | HEART RATE: 72 BPM | SYSTOLIC BLOOD PRESSURE: 130 MMHG

## 2019-12-31 DIAGNOSIS — H53.8 BLURRING OF VISUAL IMAGE OF BOTH EYES: ICD-10-CM

## 2019-12-31 DIAGNOSIS — R42 LIGHTHEADEDNESS: ICD-10-CM

## 2019-12-31 DIAGNOSIS — K40.90 INGUINAL HERNIA, LEFT: ICD-10-CM

## 2019-12-31 DIAGNOSIS — I10 ESSENTIAL HYPERTENSION: Primary | ICD-10-CM

## 2019-12-31 DIAGNOSIS — G89.29 CHRONIC NECK PAIN: ICD-10-CM

## 2019-12-31 DIAGNOSIS — R51.9 TEMPORAL PAIN: ICD-10-CM

## 2019-12-31 DIAGNOSIS — M54.2 CHRONIC NECK PAIN: ICD-10-CM

## 2019-12-31 DIAGNOSIS — Q61.02 MULTIPLE RENAL CYSTS: ICD-10-CM

## 2019-12-31 DIAGNOSIS — I77.810 ASCENDING AORTA DILATATION (HCC): ICD-10-CM

## 2019-12-31 DIAGNOSIS — J30.2 CHRONIC SEASONAL ALLERGIC RHINITIS: ICD-10-CM

## 2019-12-31 PROCEDURE — G8417 CALC BMI ABV UP PARAM F/U: HCPCS | Performed by: NURSE PRACTITIONER

## 2019-12-31 PROCEDURE — G8482 FLU IMMUNIZE ORDER/ADMIN: HCPCS | Performed by: NURSE PRACTITIONER

## 2019-12-31 PROCEDURE — G8427 DOCREV CUR MEDS BY ELIG CLIN: HCPCS | Performed by: NURSE PRACTITIONER

## 2019-12-31 PROCEDURE — 1036F TOBACCO NON-USER: CPT | Performed by: NURSE PRACTITIONER

## 2019-12-31 PROCEDURE — 3017F COLORECTAL CA SCREEN DOC REV: CPT | Performed by: NURSE PRACTITIONER

## 2019-12-31 PROCEDURE — 99214 OFFICE O/P EST MOD 30 MIN: CPT | Performed by: NURSE PRACTITIONER

## 2019-12-31 RX ORDER — CETIRIZINE HYDROCHLORIDE 10 MG/1
10 TABLET ORAL NIGHTLY PRN
Qty: 90 TABLET | Refills: 1 | Status: SHIPPED | OUTPATIENT
Start: 2019-12-31 | End: 2021-01-25 | Stop reason: SDUPTHER

## 2019-12-31 RX ORDER — ASPIRIN 81 MG/1
81 TABLET ORAL DAILY
Qty: 90 TABLET | Refills: 1 | Status: SHIPPED | OUTPATIENT
Start: 2019-12-31 | End: 2020-07-13 | Stop reason: SDUPTHER

## 2020-01-09 ENCOUNTER — HOSPITAL ENCOUNTER (OUTPATIENT)
Dept: NON INVASIVE DIAGNOSTICS | Age: 65
Discharge: HOME | End: 2020-01-09
Payer: MEDICAID

## 2020-01-09 ENCOUNTER — HOSPITAL ENCOUNTER (OUTPATIENT)
Age: 65
Discharge: HOME | End: 2020-01-09
Payer: MEDICAID

## 2020-01-09 ENCOUNTER — HOSPITAL ENCOUNTER (OUTPATIENT)
Dept: MRI IMAGING | Age: 65
Discharge: HOME | End: 2020-01-11
Payer: MEDICAID

## 2020-01-09 PROBLEM — I51.9 LEFT VENTRICULAR DIASTOLIC DYSFUNCTION: Status: ACTIVE | Noted: 2020-01-09

## 2020-01-09 LAB
-: ABNORMAL
ALBUMIN SERPL-MCNC: 4 G/DL (ref 3.5–5.2)
ALBUMIN/GLOBULIN RATIO: ABNORMAL (ref 1–2.5)
ALP BLD-CCNC: 56 U/L (ref 40–129)
ALT SERPL-CCNC: 16 U/L (ref 5–41)
AMORPHOUS: ABNORMAL
ANION GAP SERPL CALCULATED.3IONS-SCNC: 14 MMOL/L (ref 9–17)
AST SERPL-CCNC: 22 U/L
BACTERIA: ABNORMAL
BILIRUB SERPL-MCNC: 0.48 MG/DL (ref 0.3–1.2)
BILIRUBIN URINE: NEGATIVE
BUN BLDV-MCNC: 10 MG/DL (ref 8–23)
BUN/CREAT BLD: ABNORMAL (ref 9–20)
CALCIUM SERPL-MCNC: 9.6 MG/DL (ref 8.6–10.4)
CASTS UA: ABNORMAL /LPF
CHLORIDE BLD-SCNC: 103 MMOL/L (ref 98–107)
CO2: 23 MMOL/L (ref 20–31)
COLOR: YELLOW
COMMENT UA: ABNORMAL
CREAT SERPL-MCNC: 0.89 MG/DL (ref 0.7–1.2)
CRYSTALS, UA: ABNORMAL /HPF
EPITHELIAL CELLS UA: ABNORMAL /HPF
ESTIMATED AVERAGE GLUCOSE: 91 MG/DL
GFR AFRICAN AMERICAN: >60 ML/MIN
GFR NON-AFRICAN AMERICAN: >60 ML/MIN
GFR SERPL CREATININE-BSD FRML MDRD: ABNORMAL ML/MIN/{1.73_M2}
GFR SERPL CREATININE-BSD FRML MDRD: ABNORMAL ML/MIN/{1.73_M2}
GLUCOSE BLD-MCNC: 107 MG/DL (ref 70–99)
GLUCOSE URINE: NEGATIVE
HBA1C MFR BLD: 4.8 % (ref 4–6)
HCT VFR BLD CALC: 47 % (ref 41–53)
HEMOGLOBIN: 15.9 G/DL (ref 13.5–17.5)
KETONES, URINE: NEGATIVE
LEUKOCYTE ESTERASE, URINE: ABNORMAL
LV EF: 55 %
LVEF MODALITY: NORMAL
MCH RBC QN AUTO: 33.5 PG (ref 26–34)
MCHC RBC AUTO-ENTMCNC: 33.8 G/DL (ref 31–37)
MCV RBC AUTO: 99.2 FL (ref 80–100)
MUCUS: ABNORMAL
NITRITE, URINE: NEGATIVE
NRBC AUTOMATED: NORMAL PER 100 WBC
OTHER OBSERVATIONS UA: ABNORMAL
PDW BLD-RTO: 12.5 % (ref 11.5–14.9)
PH UA: 6 (ref 5–8)
PLATELET # BLD: 274 K/UL (ref 150–450)
PMV BLD AUTO: 7.9 FL (ref 6–12)
POTASSIUM SERPL-SCNC: 4.1 MMOL/L (ref 3.7–5.3)
PROSTATE SPECIFIC ANTIGEN: 1.27 UG/L
PROTEIN UA: NEGATIVE
RBC # BLD: 4.73 M/UL (ref 4.5–5.9)
RBC UA: ABNORMAL /HPF
RENAL EPITHELIAL, UA: ABNORMAL /HPF
SODIUM BLD-SCNC: 140 MMOL/L (ref 135–144)
SPECIFIC GRAVITY UA: 1.01 (ref 1–1.03)
TOTAL PROTEIN: 7.6 G/DL (ref 6.4–8.3)
TRICHOMONAS: ABNORMAL
TURBIDITY: CLEAR
URINE HGB: NEGATIVE
UROBILINOGEN, URINE: NORMAL
VITAMIN D 25-HYDROXY: 16.7 NG/ML (ref 30–100)
WBC # BLD: 5.6 K/UL (ref 3.5–11)
WBC UA: ABNORMAL /HPF
YEAST: ABNORMAL

## 2020-01-09 PROCEDURE — 93306 TTE W/DOPPLER COMPLETE: CPT

## 2020-01-09 PROCEDURE — 81001 URINALYSIS AUTO W/SCOPE: CPT

## 2020-01-09 PROCEDURE — 80053 COMPREHEN METABOLIC PANEL: CPT

## 2020-01-09 PROCEDURE — 70551 MRI BRAIN STEM W/O DYE: CPT

## 2020-01-09 PROCEDURE — 85027 COMPLETE CBC AUTOMATED: CPT

## 2020-01-09 PROCEDURE — G0103 PSA SCREENING: HCPCS

## 2020-01-09 PROCEDURE — 36415 COLL VENOUS BLD VENIPUNCTURE: CPT

## 2020-01-09 PROCEDURE — 70544 MR ANGIOGRAPHY HEAD W/O DYE: CPT

## 2020-01-09 PROCEDURE — 82306 VITAMIN D 25 HYDROXY: CPT

## 2020-01-09 PROCEDURE — 93005 ELECTROCARDIOGRAM TRACING: CPT

## 2020-01-09 PROCEDURE — 87086 URINE CULTURE/COLONY COUNT: CPT

## 2020-01-09 PROCEDURE — 83036 HEMOGLOBIN GLYCOSYLATED A1C: CPT

## 2020-01-09 RX ORDER — ERGOCALCIFEROL 1.25 MG/1
50000 CAPSULE ORAL WEEKLY
Qty: 4 CAPSULE | Refills: 1 | Status: SHIPPED | OUTPATIENT
Start: 2020-01-09 | End: 2020-07-22 | Stop reason: ALTCHOICE

## 2020-01-10 LAB
CULTURE: NO GROWTH
EKG ATRIAL RATE: 56 BPM
EKG P AXIS: 15 DEGREES
EKG P-R INTERVAL: 188 MS
EKG Q-T INTERVAL: 424 MS
EKG QRS DURATION: 90 MS
EKG QTC CALCULATION (BAZETT): 409 MS
EKG R AXIS: -28 DEGREES
EKG T AXIS: -25 DEGREES
EKG VENTRICULAR RATE: 56 BPM
Lab: NORMAL
SPECIMEN DESCRIPTION: NORMAL

## 2020-01-31 ENCOUNTER — HOSPITAL ENCOUNTER (OUTPATIENT)
Dept: ULTRASOUND IMAGING | Age: 65
Discharge: HOME | End: 2020-02-02
Payer: MEDICAID

## 2020-01-31 ENCOUNTER — HOSPITAL ENCOUNTER (OUTPATIENT)
Age: 65
Discharge: HOME | End: 2020-02-02
Payer: MEDICAID

## 2020-01-31 ENCOUNTER — HOSPITAL ENCOUNTER (OUTPATIENT)
Dept: GENERAL RADIOLOGY | Age: 65
Discharge: HOME | End: 2020-02-02
Payer: MEDICAID

## 2020-01-31 ENCOUNTER — OFFICE VISIT (OUTPATIENT)
Dept: FAMILY MEDICINE CLINIC | Age: 65
End: 2020-01-31
Payer: MEDICAID

## 2020-01-31 VITALS
DIASTOLIC BLOOD PRESSURE: 84 MMHG | TEMPERATURE: 97.7 F | HEART RATE: 97 BPM | SYSTOLIC BLOOD PRESSURE: 128 MMHG | WEIGHT: 233 LBS | OXYGEN SATURATION: 97 % | BODY MASS INDEX: 40.63 KG/M2

## 2020-01-31 PROBLEM — F43.9 SITUATIONAL STRESS: Status: ACTIVE | Noted: 2020-01-31

## 2020-01-31 PROCEDURE — 76770 US EXAM ABDO BACK WALL COMP: CPT

## 2020-01-31 PROCEDURE — G8926 SPIRO NO PERF OR DOC: HCPCS | Performed by: NURSE PRACTITIONER

## 2020-01-31 PROCEDURE — 3017F COLORECTAL CA SCREEN DOC REV: CPT | Performed by: NURSE PRACTITIONER

## 2020-01-31 PROCEDURE — G8417 CALC BMI ABV UP PARAM F/U: HCPCS | Performed by: NURSE PRACTITIONER

## 2020-01-31 PROCEDURE — 76857 US EXAM PELVIC LIMITED: CPT

## 2020-01-31 PROCEDURE — 1036F TOBACCO NON-USER: CPT | Performed by: NURSE PRACTITIONER

## 2020-01-31 PROCEDURE — 1123F ACP DISCUSS/DSCN MKR DOCD: CPT | Performed by: NURSE PRACTITIONER

## 2020-01-31 PROCEDURE — G8427 DOCREV CUR MEDS BY ELIG CLIN: HCPCS | Performed by: NURSE PRACTITIONER

## 2020-01-31 PROCEDURE — 99214 OFFICE O/P EST MOD 30 MIN: CPT | Performed by: NURSE PRACTITIONER

## 2020-01-31 PROCEDURE — G8482 FLU IMMUNIZE ORDER/ADMIN: HCPCS | Performed by: NURSE PRACTITIONER

## 2020-01-31 PROCEDURE — 3023F SPIROM DOC REV: CPT | Performed by: NURSE PRACTITIONER

## 2020-01-31 PROCEDURE — 4040F PNEUMOC VAC/ADMIN/RCVD: CPT | Performed by: NURSE PRACTITIONER

## 2020-01-31 PROCEDURE — 71046 X-RAY EXAM CHEST 2 VIEWS: CPT

## 2020-01-31 RX ORDER — GUAIFENESIN 600 MG/1
600-1200 TABLET, EXTENDED RELEASE ORAL 2 TIMES DAILY PRN
Qty: 60 TABLET | Refills: 0 | Status: SHIPPED | OUTPATIENT
Start: 2020-01-31 | End: 2020-03-16

## 2020-01-31 RX ORDER — BENZONATATE 100 MG/1
100 CAPSULE ORAL 3 TIMES DAILY PRN
Qty: 30 CAPSULE | Refills: 0 | Status: SHIPPED | OUTPATIENT
Start: 2020-01-31 | End: 2020-02-10

## 2020-01-31 RX ORDER — PREDNISONE 20 MG/1
40 TABLET ORAL DAILY
Qty: 10 TABLET | Refills: 0 | Status: SHIPPED | OUTPATIENT
Start: 2020-01-31 | End: 2020-02-05

## 2020-01-31 RX ORDER — DOXYCYCLINE HYCLATE 100 MG/1
100 CAPSULE ORAL 2 TIMES DAILY
Qty: 20 CAPSULE | Refills: 0 | Status: SHIPPED | OUTPATIENT
Start: 2020-01-31 | End: 2020-02-10

## 2020-01-31 ASSESSMENT — ENCOUNTER SYMPTOMS
SINUS PAIN: 0
WHEEZING: 1
SINUS PRESSURE: 0
SORE THROAT: 0
RHINORRHEA: 0
COUGH: 1
SHORTNESS OF BREATH: 1
ABDOMINAL PAIN: 1

## 2020-01-31 NOTE — PROGRESS NOTES
Leola Head. Shyanne SanchezADONAY-CNP  [address] [facility]  [address]  [address] [address]  Dept: [phone]  Dept Fax: [phone]    HPI:   /84 (Site: Right Upper Arm, Position: Sitting, Cuff Size: Large Adult)   Pulse 97   Temp 97.7 °F (36.5 °C) (Oral)   Wt 233 lb (105.7 kg)   SpO2 97%   BMI 40.63 kg/m²      Kris Angel is a 72 y.o. male who presents today for his medical conditions/complaintsas noted below. Kris Angel is c/o of 1 Month Follow-Up; Chest Congestion (x 3 days); Results; and URI    HPI  1. Essential hypertension  2. Ascending aorta dilatation (HCC)  3. Lightheadedness  -Has an upcoming appointment with cardiologist  -No dizziness lately  -No chest pain, palpitations  -Current medication regimen to control condition includes: Amlodipine 5 mg QD  -  BP Readings from Last 3 Encounters:   01/31/20 128/84   12/31/19 (!) 130/90   10/30/19 128/80     4. Liver lesion  5. Multiple renal cysts- right  -US's still need completed    6. Temporal pain- temporal lobe b/l sides  7. Blurring of visual image of both eyes  -Only notes when he is low on caffeine, otherwise stable  -Recent MRI/MRA head WNL    8. Chronic obstructive pulmonary disease, unspecified COPD type ([facility] [address]- possibly has a URI currently  9. Mild intermittent asthma without complication  -For 3-4 days he has been cough with productive sputum- yellow  -Slight fever 3 days ago, went away  -+ SOB/wheezing  -Denies sinus pain/pressure/rhinorrhea  -+Dry throat  -Denies ear pain  -Zyrtec- tried  -Using albuterol inhaler more  -Also taking Flonase  -S/s seem to be improving    10.  Left inguinal hernia  -Only notes pain to left groin after prolonged standing, then stops  Narrative   EXAMINATION:   MRI OF THE BRAIN WITHOUT CONTRAST  1/9/2020 10:37 am       TECHNIQUE:   Multiplanar multisequence MRI of the brain was performed without the   administration of intravenous contrast.     pressure.   No significant pericardial effusion is seen.     Signature  ----------------------------------------------------------------------------   Electronically signed by Dayana Roman(Sonographer) on 01/09/2020 10:17   AM    Past Medical History:   Diagnosis Date    Acute hepatitis     Asthma     Epilepsia ([facility] [address]     last seizure known was when he was 32years old    Family history of brain cancer 10/2/2017    Hematuria 6/30/2015    History of total knee replacement 2/16/2017    Hypertension     Osteoarthritis     Thoracic degenerative disc disease 12/21/2017      Past Surgical History:   Procedure Laterality Date    COLONOSCOPY  07/10/2015    DR NEELIMA GALARZA - diverticula    INGUINAL HERNIA REPAIR Right     JOINT REPLACEMENT Left 01/03/2018    JOINT REPLACEMENT Left 01/03/2017    TOTAL KNEE ARTHROPLASTY  11/02/2016    right       Family History   Problem Relation Age of Onset    Heart Disease Father         chf    Cancer Sister         Brain Cancer       Social History     Tobacco Use    Smoking status: Never Smoker    Smokeless tobacco: Never Used   Substance Use Topics    Alcohol use: No     Alcohol/week: 0.0 standard drinks      Current Outpatient Medications   Medication Sig Dispense Refill    doxycycline hyclate (VIBRAMYCIN) 100 MG capsule Take 1 capsule by mouth 2 times daily for 10 days 20 capsule 0    benzonatate (TESSALON PERLES) 100 MG capsule Take 1 capsule by mouth 3 times daily as needed for Cough 30 capsule 0    predniSONE (DELTASONE) 20 MG tablet Take 2 tablets by mouth daily for 5 days 10 tablet 0    guaiFENesin (MUCINEX) 600 MG extended release tablet Take 1-2 tablets by mouth 2 times daily as needed for Congestion 60 tablet 0    cetirizine (ZYRTEC) 10 MG tablet Take 1 tablet by mouth nightly as needed for Allergies or Rhinitis 90 tablet 1    aspirin EC 81 MG EC tablet Take 1 tablet by mouth daily 90 tablet 1    albuterol sulfate HFA (PROAIR HFA) 108 (90 Base) MCG/ACT inhaler Inhale 2 puffs into the lungs every 4 hours as needed for Wheezing or Shortness of Breath 3 Inhaler 1    fluticasone (FLONASE) 50 MCG/ACT nasal spray 1-2 sprays by Nasal route daily as needed for Rhinitis or Allergies 3 Bottle 1    amLODIPine (NORVASC) 5 MG tablet Take 1 tablet by mouth daily 90 tablet 1    vitamin D (ERGOCALCIFEROL) 1.25 MG (59229 UT) CAPS capsule Take 1 capsule by mouth once a week for 8 doses (Patient not taking: Reported on 1/31/2020) 4 capsule 1     No current facility-administered medications for this visit. Allergies   Allergen Reactions    Mobic [Meloxicam]      Patient was hospitalized after taking this medication. Health Maintenance   Topic Date Due    DTaP/Tdap/Td vaccine (1 - Tdap) 10/16/2020 (Originally 1/31/1966)    Shingles Vaccine (1 of 2) 10/16/2020 (Originally 1/31/2005)    Potassium monitoring  01/09/2021    Creatinine monitoring  01/09/2021    Lipid screen  03/31/2022    Pneumococcal 65+ years Vaccine (2 of 2 - PPSV23) 10/02/2022    Diabetes screen  01/09/2023    Colon cancer screen colonoscopy  07/10/2025    Flu vaccine  Completed    Hepatitis C screen  Completed    HIV screen  Completed     Subjective:   Review of Systems   Constitutional: Positive for fatigue. Negative for chills and fever. HENT: Positive for congestion. Negative for ear pain, postnasal drip, rhinorrhea, sinus pressure, sinus pain and sore throat. Respiratory: Positive for cough, shortness of breath and wheezing. Cardiovascular: Negative for chest pain. Gastrointestinal: Positive for abdominal pain (Left sided groin pain). Allergic/Immunologic: Positive for environmental allergies. Neurological: Negative for dizziness and headaches.    Psychiatric/Behavioral: Positive for dysphoric mood (States wife has been agitated lately and sometimes he feels that it rubs off on him- he gets agitated b/c she smokes, not feeling depressed/anxious on regular basis, denies SI/HI). Objective:   /84 (Site: Right Upper Arm, Position: Sitting, Cuff Size: Large Adult)   Pulse 97   Temp 97.7 °F (36.5 °C) (Oral)   Wt 233 lb (105.7 kg)   SpO2 97%   BMI 40.63 kg/m²   Physical Exam  Vitals signs and nursing note reviewed. Constitutional:       General: He is not in acute distress. Appearance: Normal appearance. He is well-developed. He is not ill-appearing, toxic-appearing or diaphoretic. HENT:      Head: Normocephalic and atraumatic. Right Ear: Tympanic membrane, ear canal and external ear normal.      Left Ear: Ear canal and external ear normal. A middle ear effusion is present. Tympanic membrane is not erythematous. Nose: Mucosal edema, congestion and rhinorrhea present. Right Turbinates: Enlarged and swollen. Left Turbinates: Enlarged and swollen. Right Sinus: No maxillary sinus tenderness or frontal sinus tenderness. Left Sinus: No maxillary sinus tenderness or frontal sinus tenderness. Mouth/Throat:      Lips: No lesions. Pharynx: Posterior oropharyngeal erythema present. No pharyngeal swelling or oropharyngeal exudate. Tonsils: No tonsillar exudate or tonsillar abscesses. Eyes:      Conjunctiva/sclera: Conjunctivae normal.      Pupils: Pupils are equal, round, and reactive to light. Pupils are equal.      Funduscopic exam:     Right eye: Red reflex present. Left eye: Red reflex present. Neck:      Musculoskeletal: Normal range of motion. Vascular: No carotid bruit or JVD. Cardiovascular:      Rate and Rhythm: Normal rate and regular rhythm. No extrasystoles are present. Chest Wall: PMI is not displaced. No thrill. Pulses:           Radial pulses are 2+ on the right side and 2+ on the left side. Dorsalis pedis pulses are 2+ on the right side and 2+ on the left side. Posterior tibial pulses are 2+ on the right side and 2+ on the left side.       Heart sounds: Normal heart (VIBRAMYCIN) 100 MG capsule; Take 1 capsule by mouth 2 times daily for 10 days  Dispense: 20 capsule; Refill: 0  -Avoid taking w/dairy products  - benzonatate (TESSALON PERLES) 100 MG capsule; Take 1 capsule by mouth 3 times daily as needed for Cough  Dispense: 30 capsule; Refill: 0  - predniSONE (DELTASONE) 20 MG tablet; Take 2 tablets by mouth daily for 5 days  Dispense: 10 tablet; Refill: 0  - guaiFENesin (MUCINEX) 600 MG extended release tablet; Take 1-2 tablets by mouth 2 times daily as needed for Congestion  Dispense: 60 tablet; Refill: 0  - XR CHEST STANDARD (2 VW); Future    11. Inguinal hernia, left  -Complete pelvic US as previously ordered    12. Vitamin D deficiency  -Start weekly high dose Vit. D as previously ordered  -Re-check labs in 12 weeks    13. Situational stress  -Will get patient set up with in office counselor to give him some better advise on how to manage stressors per his significant other    Advised to complete all testing as previously ordered. Return in about 2 weeks (around 2/14/2020) for re-check lungs. Discussed use, benefit, and side effects of prescribed medications. Barriers to medication compliance addressed. All patient questions answered, patient voiced understanding. Tatiana Villalba.  ADONAY Blood-CNP

## 2020-05-15 RX ORDER — AMLODIPINE BESYLATE 5 MG/1
TABLET ORAL
Qty: 90 TABLET | Refills: 1 | Status: SHIPPED | OUTPATIENT
Start: 2020-05-15 | End: 2020-07-28

## 2020-07-13 RX ORDER — ASPIRIN 81 MG/1
81 TABLET ORAL DAILY
Qty: 90 TABLET | Refills: 0 | Status: SHIPPED | OUTPATIENT
Start: 2020-07-13 | End: 2020-10-15

## 2020-07-21 NOTE — PROGRESS NOTES
Christiano Gerardo. Hadley Cabral, APRN-CNP  [address] [address]  [address], [address]  [address] [facility]  Dept: [phone]  Dept Fax: [phone]    HPI:   /82   Pulse 73   Temp 97.6 °F (36.4 °C)   Resp 16   Wt 240 lb (108.9 kg)   SpO2 95%   BMI 41.85 kg/m²      Devora Alarcon is a 72 y.o. male who presents today for his medical conditions/complaintsas noted below. Devora Alarcon is c/o of URI; Asthma; and Hernia    Asthma   He complains of chest tightness (Especially w/laying down, coughs throughout the nights, spits up mucous), cough, difficulty breathing, frequent throat clearing, hoarse voice, shortness of breath, sputum production and wheezing. There is no hemoptysis. Primary symptoms comments: Feels like asthma has been flared for about 2 months. This is a recurrent problem. The current episode started more than 1 month ago. The problem occurs constantly. The problem has been gradually worsening. The cough is productive of purulent sputum. Associated symptoms include dyspnea on exertion, malaise/fatigue (With exercise), nasal congestion and orthopnea. Pertinent negatives include no chest pain, ear congestion, ear pain, fever, postnasal drip, rhinorrhea or sore throat. His symptoms are aggravated by exposure to smoke (Cleaning products). Relieved by: Breathing tx's help at night, air conditioner. He reports moderate improvement on treatment. Risk factors for lung disease include smoking/tobacco exposure. His past medical history is significant for asthma, bronchitis and COPD. There is no history of pneumonia.    -Denies any + COVID-19 contacts or known exposure, no recent travel    -Left inguinal hernia  -Still having pain to left groin every day, worse w/standing  -Feels like \"liquid\" but does not see anything, pain is relieved when he pushes it back in  Narrative    EXAMINATION:    PELVIC ULTRASOUND         1/31/2020         TECHNIQUE:    Transabdominal pelvic ultrasound was performed         COMPARISON:    CT 01/08/2018         HISTORY:    ORDERING SYSTEM PROVIDED HISTORY: Left groin pain    TECHNOLOGIST PROVIDED HISTORY:    Left sided groin pain, r/o inguinal hernia         FINDINGS:    Direct scanning in the left groin at the site of pain/lump was performed with    Valsalva maneuver.  Best seen on cine images is what appears to be a fat    containing inguinal hernia.  No regional bowel loops or fluid collections are    seen.  Similar findings noted on the prior CT.              Impression    Ultrasound findings compatible with a left inguinal hernia.       Past Medical History:   Diagnosis Date    Acute hepatitis     Asthma     Epilepsia [facility])     last seizure known was when he was 32years old    Family history of brain cancer 10/2/2017    Hematuria 6/30/2015    History of total knee replacement 2/16/2017    Hypertension     Osteoarthritis     Thoracic degenerative disc disease 12/21/2017      Past Surgical History:   Procedure Laterality Date    COLONOSCOPY  07/10/2015    DR NEELIMA GALARZA - diverticula    INGUINAL HERNIA REPAIR Right     JOINT REPLACEMENT Left 01/03/2018    JOINT REPLACEMENT Left 01/03/2017    TOTAL KNEE ARTHROPLASTY  11/02/2016    right       Family History   Problem Relation Age of Onset    Heart Disease Father         chf    Cancer Sister         Brain Cancer       Social History     Tobacco Use    Smoking status: Never Smoker    Smokeless tobacco: Never Used   Substance Use Topics    Alcohol use: No     Alcohol/week: 0.0 standard drinks      Current Outpatient Medications   Medication Sig Dispense Refill    albuterol (PROVENTIL) (2.5 MG/3ML) 0.083% nebulizer solution Take 2.5 mg by nebulization every 6 hours as needed for Wheezing or Shortness of Breath      doxycycline hyclate (VIBRAMYCIN) 100 MG capsule Take 1 capsule by mouth 2 times daily for 10 days 20 capsule 0    predniSONE (DELTASONE) 20 MG tablet Take 2 tablets by mouth daily for 5 days 10 tablet 0    guaiFENesin (MUCINEX) 600 MG extended release tablet Take 1-2 tablets by mouth 2 times daily as needed for Congestion 60 tablet 0    aspirin EC 81 MG EC tablet Take 1 tablet by mouth daily 90 tablet 0    amLODIPine (NORVASC) 5 MG tablet take 1 tablet by mouth once daily 90 tablet 1    cetirizine (ZYRTEC) 10 MG tablet Take 1 tablet by mouth nightly as needed for Allergies or Rhinitis 90 tablet 1    albuterol sulfate HFA (PROAIR HFA) 108 (90 Base) MCG/ACT inhaler Inhale 2 puffs into the lungs every 4 hours as needed for Wheezing or Shortness of Breath 3 Inhaler 1    fluticasone (FLONASE) 50 MCG/ACT nasal spray 1-2 sprays by Nasal route daily as needed for Rhinitis or Allergies 3 Bottle 1     No current facility-administered medications for this visit. Allergies   Allergen Reactions    Mobic [Meloxicam]      Patient was hospitalized after taking this medication. Health Maintenance   Topic Date Due    Annual Wellness Visit (AWV)  07/22/2020    DTaP/Tdap/Td vaccine (1 - Tdap) 10/16/2020 (Originally 1/31/1974)    Flu vaccine (1) 09/01/2020    Shingles Vaccine (2 of 2) 09/07/2020    Potassium monitoring  01/09/2021    Creatinine monitoring  01/09/2021    Lipid screen  03/31/2022    Pneumococcal 65+ years Vaccine (2 of 2 - PPSV23) 10/02/2022    Diabetes screen  01/09/2023    Colon cancer screen colonoscopy  07/10/2025    Hepatitis C screen  Completed    HIV screen  Completed    Hepatitis A vaccine  Aged Out    Hepatitis B vaccine  Aged Out    Hib vaccine  Aged Out    Meningococcal (ACWY) vaccine  Aged Out     Subjective:   Review of Systems   Constitutional: Positive for fatigue and malaise/fatigue (With exercise). Negative for chills and fever. HENT: Positive for congestion and hoarse voice. Negative for ear pain, postnasal drip, rhinorrhea and sore throat.     Respiratory: Positive for cough, sputum production, chest tightness, shortness inguinal area (No hernia palpated- noted on last US, pain w/palpation). Musculoskeletal: Normal range of motion. Lymphadenopathy:      Cervical: No cervical adenopathy. Skin:     General: Skin is warm and dry. Findings: No erythema or rash. Neurological:      Mental Status: He is alert and oriented to person, place, and time. Gait: Gait normal.   Psychiatric:         Attention and Perception: Attention normal.         Mood and Affect: Mood normal.         Speech: Speech normal.         Behavior: Behavior normal.       Data:     Lab Results   Component Value Date     01/09/2020    K 4.1 01/09/2020     01/09/2020    CO2 23 01/09/2020    BUN 10 01/09/2020    CREATININE 0.89 01/09/2020    GLUCOSE 107 01/09/2020    GLUCOSE 94 04/16/2012    PROT 7.6 01/09/2020    LABALBU 4.0 01/09/2020    BILITOT 0.48 01/09/2020    ALKPHOS 56 01/09/2020    AST 22 01/09/2020    ALT 16 01/09/2020     Lab Results   Component Value Date    WBC 5.6 01/09/2020    RBC 4.73 01/09/2020    RBC 4.67 04/16/2012    HGB 15.9 01/09/2020    HCT 47.0 01/09/2020    MCV 99.2 01/09/2020    MCH 33.5 01/09/2020    MCHC 33.8 01/09/2020    RDW 12.5 01/09/2020     01/09/2020     04/16/2012    MPV 7.9 01/09/2020     Lab Results   Component Value Date    TSH 1.75 03/31/2017     Lab Results   Component Value Date    CHOL 152 03/31/2017    CHOL 161 02/19/2016    HDL 45 03/31/2017    PSA 1.27 01/09/2020    LABA1C 4.8 01/09/2020     Assessment & Plan:      1. Chronic obstructive pulmonary disease with acute lower respiratory infection ([facility] [address]  2. Acute bronchitis, unspecified organism  3.  Mild intermittent asthma with acute exacerbation  -Will tx for COPD/asthma exacerbation with lower respiratory infection  -Medications as ordered:  -Increase fluids, rest, call the office if s/s do not improve within 7 days or worsen at anytime, thorough hand washing techniques recommended, patient verbalized understanding  -Instructed patient

## 2020-07-22 ENCOUNTER — OFFICE VISIT (OUTPATIENT)
Dept: FAMILY MEDICINE CLINIC | Age: 65
End: 2020-07-22
Payer: MEDICARE

## 2020-07-22 ENCOUNTER — HOSPITAL ENCOUNTER (OUTPATIENT)
Age: 65
Discharge: HOME | End: 2020-07-24
Payer: MEDICARE

## 2020-07-22 ENCOUNTER — HOSPITAL ENCOUNTER (OUTPATIENT)
Dept: GENERAL RADIOLOGY | Age: 65
Discharge: HOME | End: 2020-07-24
Payer: MEDICARE

## 2020-07-22 VITALS
TEMPERATURE: 97.6 F | BODY MASS INDEX: 41.85 KG/M2 | RESPIRATION RATE: 16 BRPM | HEART RATE: 73 BPM | WEIGHT: 240 LBS | SYSTOLIC BLOOD PRESSURE: 134 MMHG | DIASTOLIC BLOOD PRESSURE: 82 MMHG | OXYGEN SATURATION: 95 %

## 2020-07-22 PROBLEM — E66.01 MORBIDLY OBESE (HCC): Status: ACTIVE | Noted: 2020-07-22

## 2020-07-22 PROCEDURE — 4040F PNEUMOC VAC/ADMIN/RCVD: CPT | Performed by: NURSE PRACTITIONER

## 2020-07-22 PROCEDURE — G8427 DOCREV CUR MEDS BY ELIG CLIN: HCPCS | Performed by: NURSE PRACTITIONER

## 2020-07-22 PROCEDURE — 71046 X-RAY EXAM CHEST 2 VIEWS: CPT

## 2020-07-22 PROCEDURE — 1123F ACP DISCUSS/DSCN MKR DOCD: CPT | Performed by: NURSE PRACTITIONER

## 2020-07-22 PROCEDURE — 3023F SPIROM DOC REV: CPT | Performed by: NURSE PRACTITIONER

## 2020-07-22 PROCEDURE — G8926 SPIRO NO PERF OR DOC: HCPCS | Performed by: NURSE PRACTITIONER

## 2020-07-22 PROCEDURE — 3017F COLORECTAL CA SCREEN DOC REV: CPT | Performed by: NURSE PRACTITIONER

## 2020-07-22 PROCEDURE — 1036F TOBACCO NON-USER: CPT | Performed by: NURSE PRACTITIONER

## 2020-07-22 PROCEDURE — G8417 CALC BMI ABV UP PARAM F/U: HCPCS | Performed by: NURSE PRACTITIONER

## 2020-07-22 PROCEDURE — 99214 OFFICE O/P EST MOD 30 MIN: CPT | Performed by: NURSE PRACTITIONER

## 2020-07-22 RX ORDER — PREDNISONE 20 MG/1
40 TABLET ORAL DAILY
Qty: 10 TABLET | Refills: 0 | Status: SHIPPED | OUTPATIENT
Start: 2020-07-22 | End: 2020-07-27

## 2020-07-22 RX ORDER — GUAIFENESIN 600 MG/1
600-1200 TABLET, EXTENDED RELEASE ORAL 2 TIMES DAILY PRN
Qty: 60 TABLET | Refills: 0 | Status: SHIPPED | OUTPATIENT
Start: 2020-07-22 | End: 2021-01-25 | Stop reason: ALTCHOICE

## 2020-07-22 RX ORDER — DOXYCYCLINE HYCLATE 100 MG/1
100 CAPSULE ORAL 2 TIMES DAILY
Qty: 20 CAPSULE | Refills: 0 | Status: SHIPPED | OUTPATIENT
Start: 2020-07-22 | End: 2020-08-01

## 2020-07-22 RX ORDER — ALBUTEROL SULFATE 2.5 MG/3ML
2.5 SOLUTION RESPIRATORY (INHALATION) EVERY 6 HOURS PRN
COMMUNITY
End: 2021-01-25 | Stop reason: SDUPTHER

## 2020-07-22 ASSESSMENT — PATIENT HEALTH QUESTIONNAIRE - PHQ9
SUM OF ALL RESPONSES TO PHQ QUESTIONS 1-9: 0
SUM OF ALL RESPONSES TO PHQ9 QUESTIONS 1 & 2: 0
1. LITTLE INTEREST OR PLEASURE IN DOING THINGS: 0
SUM OF ALL RESPONSES TO PHQ QUESTIONS 1-9: 0
2. FEELING DOWN, DEPRESSED OR HOPELESS: 0

## 2020-07-22 ASSESSMENT — ENCOUNTER SYMPTOMS
WHEEZING: 1
CHEST TIGHTNESS: 1
SHORTNESS OF BREATH: 1
FREQUENT THROAT CLEARING: 1
HEMOPTYSIS: 0
HOARSE VOICE: 1
RHINORRHEA: 0
DIFFICULTY BREATHING: 1
SORE THROAT: 0
SPUTUM PRODUCTION: 1
COUGH: 1

## 2020-07-22 NOTE — PROGRESS NOTES
Visit Information    Have you changed or started any medications since your last visit including any over-the-counter medicines, vitamins, or herbal medicines? no   Have you stopped taking any of your medications? Is so, why? -  no  Are you having any side effects from any of your medications? - no    Have you seen any other physician or provider since your last visit?  no   Have you had any other diagnostic tests since your last visit?  no   Have you been seen in the emergency room and/or had an admission in a hospital since we last saw you?  no   Have you had your routine dental cleaning in the past 6 months?  no     Do you have an active MyChart account? If no, what is the barrier?   Yes    Patient Care Team:  ADONAY Muñoz CNP as PCP - General (Nurse Practitioner Family)  ADONAY Muñoz CNP as PCP - [facility]  Alison Schneider MD as Consulting Physician (Pulmonology)  Rhoda Perez MD as Consulting Physician (Gastroenterology)    Medical History Review  Past Medical, Family, and Social History reviewed and does contribute to the patient presenting condition    Health Maintenance   Topic Date Due    DTaP/Tdap/Td vaccine (1 - Tdap) 10/16/2020 (Originally 1/31/1974)    Flu vaccine (1) 09/01/2020    Shingles Vaccine (2 of 2) 09/07/2020    Potassium monitoring  01/09/2021    Creatinine monitoring  01/09/2021    Lipid screen  03/31/2022    Pneumococcal 65+ years Vaccine (2 of 2 - PPSV23) 10/02/2022    Diabetes screen  01/09/2023    Colon cancer screen colonoscopy  07/10/2025    Hepatitis C screen  Completed    HIV screen  Completed    Hepatitis A vaccine  Aged Out    Hepatitis B vaccine  Aged Out    Hib vaccine  Aged Out    Meningococcal (ACWY) vaccine  Aged Out

## 2020-07-22 NOTE — LETTER
[address] [facility]  [id number]  [address] [facility]  Phone: [phone]  Fax: [phone]    ADONAY Mo CNP        July 22, 2020     Patient: Johnathon Guillen   YOB: 1955   Date of Visit: 7/22/2020       To Whom It May Concern: It is my medical opinion that Johnathon Guillen has been instructed to self quarantine for at least the next 10 days and should be excused from community service at this time until re-evaluation per myself next week. If you have any questions or concerns, please don't hesitate to call.     Sincerely,        ADONAY Mo CNP

## 2020-07-22 NOTE — PATIENT INSTRUCTIONS
Patient Education        Asthma in Adults: Care Instructions  Your Care Instructions     During an asthma attack, your airways swell and narrow as a reaction to certain things (triggers). This makes it hard to breathe. You may be able to prevent asthma attacks if you avoid the things that set off your asthma symptoms. Keeping your asthma under control and treating symptoms before they get bad can help you avoid severe attacks. If you can control your asthma, you may be able to do all of your normal daily activities. You may also avoid asthma attacks and trips to the hospital.  Follow-up care is a key part of your treatment and safety. Be sure to make and go to all appointments, and call your doctor if you are having problems. It's also a good idea to know your test results and keep a list of the medicines you take. How can you care for yourself at home? · Follow your asthma action plan so you can manage your symptoms at home. An asthma action plan will help you prevent and control airway reactions and will tell you what to do during an asthma attack. If you do not have an asthma action plan, work with your doctor to build one. · Take your asthma medicine exactly as prescribed. Medicine plays an important role in controlling asthma. Talk to your doctor right away if you have any questions about what to take and how to take it. ? Use your quick-relief medicine when you have symptoms of an attack. Quick-relief medicine often is an albuterol inhaler. Some people need to use quick-relief medicine before they exercise. ? Take your controller medicine every day, not just when you have symptoms. Controller medicine is usually an inhaled corticosteroid. The goal is to prevent problems before they occur. Do not use your controller medicine to try to treat an attack that has already started. It does not work fast enough to help.   ? If your doctor prescribed corticosteroid pills to use during an attack, take them as directed. They may take hours to work, but they may shorten the attack and help you breathe better. ? Keep your quick-relief medicine with you at all times. · Talk to your doctor before using other medicines. Some medicines, such as aspirin, can cause asthma attacks in some people. · Check yourself for asthma symptoms to know which step to follow in your action plan. Watch for things like being short of breath, having chest tightness, coughing, and wheezing. Also notice if symptoms wake you up at night or if you get tired quickly when you exercise. · If you have a peak flow meter, use it to check how well you are breathing. This can help you predict when an asthma attack is going to occur. Then you can take medicine to prevent the asthma attack or make it less severe. · See your doctor regularly. These visits will help you learn more about asthma and what you can do to control it. Your doctor will monitor your treatment to make sure the medicine is helping you. · Keep track of your asthma attacks and your treatment. After you have had an attack, write down what triggered it, what helped end it, and any concerns you have about your asthma action plan. Take your diary when you see your doctor. You can then review your asthma action plan and decide if it is working. · Do not smoke or allow others to smoke around you. Avoid smoky places. Smoking makes asthma worse. If you need help quitting, talk to your doctor about stop-smoking programs and medicines. These can increase your chances of quitting for good. · Learn what triggers an asthma attack for you, and avoid the triggers when you can. Common triggers include colds, smoke, air pollution, dust, pollen, mold, pets, cockroaches, stress, and cold air. · Avoid colds and the flu. Get a pneumococcal vaccine shot. If you have had one before, ask your doctor whether you need a second dose. Get a flu vaccine every fall.  If you must be around people with colds or the flu, wash your hands often. When should you call for help? [id number] anytime you think you may need emergency care. For example, call if:  · You have severe trouble breathing. Call your doctor now or seek immediate medical care if:  · Your symptoms do not get better after you have followed your asthma action plan. · You cough up yellow, dark brown, or bloody mucus (sputum). Watch closely for changes in your health, and be sure to contact your doctor if:  · Your coughing and wheezing get worse. · You need to use quick-relief medicine on more than 2 days a week (unless it is just for exercise). · You need help figuring out what is triggering your asthma attacks. Where can you learn more? Go to https://[employer]. org and sign in to your [employer] account. Enter P597 in the [employer] box to learn more about \"Asthma in Adults: Care Instructions. \"     If you do not have an account, please click on the \"Sign Up Now\" link. Current as of: February 24, 2020               Content Version: 12.5  © 6888-2324 Healthwise, Incorporated. Care instructions adapted under license by [facility] ([facility]). If you have questions about a medical condition or this instruction, always ask your healthcare professional. [address] any warranty or liability for your use of this information.

## 2020-07-28 ENCOUNTER — OFFICE VISIT (OUTPATIENT)
Dept: SURGERY | Age: 65
End: 2020-07-28
Payer: MEDICARE

## 2020-07-28 ENCOUNTER — OFFICE VISIT (OUTPATIENT)
Dept: FAMILY MEDICINE CLINIC | Age: 65
End: 2020-07-28
Payer: MEDICARE

## 2020-07-28 VITALS
OXYGEN SATURATION: 97 % | WEIGHT: 241.2 LBS | SYSTOLIC BLOOD PRESSURE: 142 MMHG | DIASTOLIC BLOOD PRESSURE: 82 MMHG | BODY MASS INDEX: 40.19 KG/M2 | HEART RATE: 58 BPM | HEIGHT: 65 IN

## 2020-07-28 VITALS
TEMPERATURE: 97.6 F | DIASTOLIC BLOOD PRESSURE: 88 MMHG | HEART RATE: 53 BPM | OXYGEN SATURATION: 95 % | WEIGHT: 240 LBS | SYSTOLIC BLOOD PRESSURE: 132 MMHG | BODY MASS INDEX: 39.94 KG/M2

## 2020-07-28 PROCEDURE — 3023F SPIROM DOC REV: CPT | Performed by: NURSE PRACTITIONER

## 2020-07-28 PROCEDURE — G8417 CALC BMI ABV UP PARAM F/U: HCPCS | Performed by: NURSE PRACTITIONER

## 2020-07-28 PROCEDURE — 99213 OFFICE O/P EST LOW 20 MIN: CPT | Performed by: NURSE PRACTITIONER

## 2020-07-28 PROCEDURE — G8926 SPIRO NO PERF OR DOC: HCPCS | Performed by: NURSE PRACTITIONER

## 2020-07-28 PROCEDURE — 1036F TOBACCO NON-USER: CPT | Performed by: NURSE PRACTITIONER

## 2020-07-28 PROCEDURE — 99203 OFFICE O/P NEW LOW 30 MIN: CPT | Performed by: SURGERY

## 2020-07-28 PROCEDURE — 4040F PNEUMOC VAC/ADMIN/RCVD: CPT | Performed by: NURSE PRACTITIONER

## 2020-07-28 PROCEDURE — 1123F ACP DISCUSS/DSCN MKR DOCD: CPT | Performed by: NURSE PRACTITIONER

## 2020-07-28 PROCEDURE — 3017F COLORECTAL CA SCREEN DOC REV: CPT | Performed by: NURSE PRACTITIONER

## 2020-07-28 PROCEDURE — G8427 DOCREV CUR MEDS BY ELIG CLIN: HCPCS | Performed by: NURSE PRACTITIONER

## 2020-07-28 RX ORDER — AMLODIPINE BESYLATE 10 MG/1
10 TABLET ORAL DAILY
Qty: 30 TABLET | Refills: 0
Start: 2020-07-28 | End: 2021-01-25 | Stop reason: SDUPTHER

## 2020-07-28 ASSESSMENT — ENCOUNTER SYMPTOMS
NAUSEA: 0
CONSTIPATION: 0
RHINORRHEA: 0
COUGH: 0
ABDOMINAL PAIN: 0
ABDOMINAL DISTENTION: 0
SHORTNESS OF BREATH: 0
DIARRHEA: 0
WHEEZING: 0
VOMITING: 0
SORE THROAT: 0
CHEST TIGHTNESS: 0
BACK PAIN: 0

## 2020-07-28 NOTE — PROGRESS NOTES
Kady Chawla, APRN-[facility]  [address]  [address] [address], [address]  [address] [address]  Dept: [phone]  Dept Fax: [phone]     Patient ID: Yvonne Guerra is a 72 y.o. male. HPI    Pt here today for f/u on COPD exacerbation. She was recently seen by my partner, patient's PCP, ADONAY Anderson on 7-22 and treated for COPD exacerbation. At that time patient complained of chest tightness (Especially w/laying down, coughs throughout the night, spits up mucous),  cough, difficulty breathing, frequent throat clearing, hoarse voice, shortness of breath, sputum production and wheezing. He was treated with Doxycycline, prednisone taper, mucinex and albuterol nebulizer treatments. Today, pt denies any fever or chills. Pt today denies any HA, chest pain, or SOB. Pt denies any N/V/D/C or abdominal pain. He relates he is much improved with the steroids and antibiotic. He does express concern regarding his blood pressure being elevated. Otherwise pt doing well on current tx and no other concerns today. The patient's past medical, surgical, social, and family history as well as his current medications and allergies were reviewed as documented in today's encounter by ENMA Porter.      Current Outpatient Medications on File Prior to Visit   Medication Sig Dispense Refill    albuterol (PROVENTIL) (2.5 MG/3ML) 0.083% nebulizer solution Take 2.5 mg by nebulization every 6 hours as needed for Wheezing or Shortness of Breath      doxycycline hyclate (VIBRAMYCIN) 100 MG capsule Take 1 capsule by mouth 2 times daily for 10 days 20 capsule 0    guaiFENesin (MUCINEX) 600 MG extended release tablet Take 1-2 tablets by mouth 2 times daily as needed for Congestion 60 tablet 0    aspirin EC 81 MG EC tablet Take 1 tablet by mouth daily 90 tablet 0    cetirizine (ZYRTEC) 10 MG tablet Take 1 tablet by mouth nightly as needed for Allergies or Rhinitis 90 tablet 1    albuterol sulfate HFA (PROAIR HFA) 108 (90 Base) MCG/ACT inhaler Inhale 2 puffs into the lungs every 4 hours as needed for Wheezing or Shortness of Breath 3 Inhaler 1    fluticasone (FLONASE) 50 MCG/ACT nasal spray 1-2 sprays by Nasal route daily as needed for Rhinitis or Allergies 3 Bottle 1     No current facility-administered medications on file prior to visit. Subjective:     Review of Systems   Constitutional: Negative for activity change, fatigue and fever. HENT: Negative for congestion, ear pain, rhinorrhea and sore throat. Respiratory: Negative for cough (improved), chest tightness, shortness of breath (improved) and wheezing (improved). Cardiovascular: Negative for chest pain (denies) and palpitations. Gastrointestinal: Negative for abdominal distention, abdominal pain, constipation, diarrhea, nausea and vomiting. Endocrine: Negative for polydipsia, polyphagia and polyuria. Genitourinary: Negative for difficulty urinating and dysuria. Musculoskeletal: Negative for arthralgias, back pain and myalgias. Skin: Negative for rash. Neurological: Negative for dizziness, weakness, light-headedness and headaches. Hematological: Negative for adenopathy. Psychiatric/Behavioral: Negative for agitation and behavioral problems. The patient is not nervous/anxious. Objective:     Physical Exam  Vitals signs reviewed. Constitutional:       General: He is not in acute distress. Appearance: Normal appearance. HENT:      Head: Normocephalic and atraumatic. Right Ear: External ear normal.      Left Ear: External ear normal.      Nose: Nose normal.      Mouth/Throat:      Mouth: Mucous membranes are moist.      Pharynx: No oropharyngeal exudate or posterior oropharyngeal erythema. Eyes:      Extraocular Movements: Extraocular movements intact. Conjunctiva/sclera: Conjunctivae normal.      Pupils: Pupils are equal, round, and reactive to light.    Neck: Musculoskeletal: Normal range of motion. Cardiovascular:      Rate and Rhythm: Normal rate and regular rhythm. Pulses: Normal pulses. Heart sounds: No murmur. Pulmonary:      Effort: Pulmonary effort is normal. No tachypnea or respiratory distress. Breath sounds: Normal breath sounds. No wheezing, rhonchi or rales. Comments: Good air exchange, no wheezing noted  Abdominal:      General: Bowel sounds are normal. There is no distension. Palpations: Abdomen is soft. Musculoskeletal: Normal range of motion. Skin:     General: Skin is warm and dry. Findings: No rash. Neurological:      General: No focal deficit present. Mental Status: He is alert and oriented to person, place, and time. Deep Tendon Reflexes: Reflexes normal.   Psychiatric:         Behavior: Behavior normal.       Assessment:      Diagnosis Orders   1. Chronic obstructive pulmonary disease with acute exacerbation ([facility] [address]  Full PFT Study Without Bronchdilator   2. Essential hypertension  amLODIPine (NORVASC) 10 MG tablet       Plan:     1. Chronic obstructive pulmonary disease with acute exacerbation (HCC)  - Improved: Medication re-filled as needed, con't medications as prescribed, con't current tx plan  - Continue with steroid taper as previously prescribed. - Continue with Doxycycline as previously prescribed. - Will place order for PFT, as it has been 3 years since his last one  - Smoking cessation  - Full PFT Study Without Bronchdilator; Future    2. Essential hypertension  - BP noted and reviewed from previous visits  - Will increase Amlodipine to 10mg daily. Patient instructed to take 2 of the 5mg tablets he has at home and call us when he needs a refill. - 1 month follow up for BP check  - Education provided on maintaining a low sodium diet and routine exercise.   - Advised to seek emergent tx if SBP>180 and/or DBP>110, any chest pain, h/a or vision changes   - amLODIPine (NORVASC) 10 MG tablet; Take 1 tablet by mouth daily  Dispense: 30 tablet; Refill: 0    - Rest of systems unchanged, continue current treatments. - Medications, labs, diagnostic studies, consultations and follow-up as documented in this encounter.  Rest of systems unchanged, continue current treatments    ADONAY Mccarty-CNP

## 2020-07-29 ASSESSMENT — ENCOUNTER SYMPTOMS
RESPIRATORY NEGATIVE: 1
GASTROINTESTINAL NEGATIVE: 1

## 2020-07-29 NOTE — H&P
Beena Hudson [address] [place] [facility]   1955   [id number]        Shabbir Park, APRN - CNP  [address]  [address]  [facility] [address]     Chief Complaint   Patient presents with    New Patient     Patient states he is here to discuss having a left inguinal hernia repaired. History of Present Illness:    Patient is seen in evaluation of an left inguinal hernia . Has been present for 3+ years. Some recent increase in size and discomfort. No specific inciting event. Review of Systems   Constitutional: Negative. Respiratory: Negative. Cardiovascular: Negative. Gastrointestinal: Negative. Musculoskeletal: Negative. Neurological: Negative. Allergies   Allergen Reactions    Mobic [Meloxicam]      Patient was hospitalized after taking this medication.         Current Outpatient Medications   Medication Sig Dispense Refill    albuterol (PROVENTIL) (2.5 MG/3ML) 0.083% nebulizer solution Take 2.5 mg by nebulization every 6 hours as needed for Wheezing or Shortness of Breath      doxycycline hyclate (VIBRAMYCIN) 100 MG capsule Take 1 capsule by mouth 2 times daily for 10 days 20 capsule 0    guaiFENesin (MUCINEX) 600 MG extended release tablet Take 1-2 tablets by mouth 2 times daily as needed for Congestion 60 tablet 0    aspirin EC 81 MG EC tablet Take 1 tablet by mouth daily 90 tablet 0    cetirizine (ZYRTEC) 10 MG tablet Take 1 tablet by mouth nightly as needed for Allergies or Rhinitis 90 tablet 1    albuterol sulfate HFA (PROAIR HFA) 108 (90 Base) MCG/ACT inhaler Inhale 2 puffs into the lungs every 4 hours as needed for Wheezing or Shortness of Breath 3 Inhaler 1    fluticasone (FLONASE) 50 MCG/ACT nasal spray 1-2 sprays by Nasal route daily as needed for Rhinitis or Allergies 3 Bottle 1    amLODIPine (NORVASC) 10 MG tablet Take 1 tablet by mouth daily 30 tablet 0     No current facility-administered medications for this visit.         Past Medical History:   Diagnosis Date    Acute hepatitis     Asthma     Epilepsia [facility])     last seizure known was when he was 32years old    Family history of brain cancer 10/2/2017    Hematuria 6/30/2015    History of total knee replacement 2/16/2017    Hypertension     Osteoarthritis     Thoracic degenerative disc disease 12/21/2017        Family History   Problem Relation Age of Onset    Heart Disease Father         chf    Cancer Sister         Brain Cancer        Past Surgical History:   Procedure Laterality Date    COLONOSCOPY  07/10/2015    DR Julien Boeck MORRIS - diverticula    INGUINAL HERNIA REPAIR Right     JOINT REPLACEMENT Left 01/03/2018    JOINT REPLACEMENT Left 01/03/2017    TOTAL KNEE ARTHROPLASTY  11/02/2016    right          Social History     Socioeconomic History    Marital status:      Spouse name: Not on file    Number of children: Not on file    Years of education: Not on file    Highest education level: Not on file   Occupational History    Occupation: retired     Employer: NONE   Social Needs    Financial resource strain: Not on file    Food insecurity     Worry: Not on file     Inability: Not on file   [employer] needs     Medical: Not on file     Non-medical: Not on file   Tobacco Use    Smoking status: Never Smoker    Smokeless tobacco: Never Used   Substance and Sexual Activity    Alcohol use: No     Alcohol/week: 0.0 standard drinks    Drug use: No    Sexual activity: Never   Lifestyle    Physical activity     Days per week: Not on file     Minutes per session: Not on file    Stress: Not on file   Relationships    Social connections     Talks on phone: Not on file     Gets together: Not on file     Attends Congregational service: Not on file     Active member of club or organization: Not on file     Attends meetings of clubs or organizations: Not on file     Relationship status: Not on file    Intimate partner violence     Fear of current or ex partner: Not on file     Emotionally abused: Not on file     Physically abused: Not on file     Forced sexual activity: Not on file   Other Topics Concern    Not on file   Social History Narrative    Not on file        Vitals:    07/28/20 1129 07/28/20 1132   BP: (!) 144/82 (!) 142/82   Site: Left Upper Arm Left Upper Arm   Position: Sitting Sitting   Cuff Size: Large Adult Large Adult   Pulse: 58    SpO2: 97%    Weight: 241 lb 3.2 oz (109.4 kg)    Height: 5' 5\" (1.651 m)         Physical Exam  Constitutional:       Appearance: He is well-developed. HENT:      Head: Normocephalic and atraumatic. Neck:      Musculoskeletal: Normal range of motion and neck supple. Cardiovascular:      Rate and Rhythm: Normal rate and regular rhythm. Pulmonary:      Effort: Pulmonary effort is normal.      Breath sounds: Normal breath sounds. Abdominal:      General: Bowel sounds are normal.      Palpations: Abdomen is soft. Hernia: A hernia is present. Hernia is present in the left inguinal area. There is no hernia in the right inguinal area. Musculoskeletal: Normal range of motion. Skin:     General: Skin is warm and dry. Neurological:      Mental Status: He is alert and oriented to person, place, and time. Psychiatric:         Behavior: Behavior normal.          Review:     Diagnosis Orders   1. Left inguinal hernia           Plan: Operative repair of hernia recommended. Laparoscopic, robot assisted vs. Open repair discussed with patient. Has elected to proceed with laparoscopic, robot assisted/possible open repair. Risks, benefits, options and potential complications of procedure were discussed with patient and they agree to proceed.      Electronically signed by Ese Andrade MD on 7/29/2020 at 10:47 AM

## 2020-08-11 ENCOUNTER — HOSPITAL ENCOUNTER (OUTPATIENT)
Dept: PREADMISSION TESTING | Age: 65
Discharge: HOME | End: 2020-08-15
Payer: MEDICARE

## 2020-08-11 VITALS
OXYGEN SATURATION: 100 % | BODY MASS INDEX: 39.27 KG/M2 | SYSTOLIC BLOOD PRESSURE: 132 MMHG | TEMPERATURE: 98.2 F | HEART RATE: 58 BPM | DIASTOLIC BLOOD PRESSURE: 83 MMHG | WEIGHT: 236 LBS | RESPIRATION RATE: 20 BRPM

## 2020-08-11 LAB
ABSOLUTE EOS #: 0.13 K/UL (ref 0–0.44)
ABSOLUTE IMMATURE GRANULOCYTE: <0.03 K/UL (ref 0–0.3)
ABSOLUTE LYMPH #: 2.68 K/UL (ref 1.1–3.7)
ABSOLUTE MONO #: 0.54 K/UL (ref 0.1–1.2)
ANION GAP SERPL CALCULATED.3IONS-SCNC: 11 MMOL/L (ref 9–17)
BASOPHILS # BLD: 2 % (ref 0–2)
BASOPHILS ABSOLUTE: 0.1 K/UL (ref 0–0.2)
BUN BLDV-MCNC: 10 MG/DL (ref 8–23)
BUN/CREAT BLD: NORMAL (ref 9–20)
CALCIUM SERPL-MCNC: 9.4 MG/DL (ref 8.6–10.4)
CHLORIDE BLD-SCNC: 105 MMOL/L (ref 98–107)
CO2: 25 MMOL/L (ref 20–31)
CREAT SERPL-MCNC: 1.03 MG/DL (ref 0.7–1.2)
DIFFERENTIAL TYPE: ABNORMAL
EOSINOPHILS RELATIVE PERCENT: 2 % (ref 1–4)
GFR AFRICAN AMERICAN: >60 ML/MIN
GFR NON-AFRICAN AMERICAN: >60 ML/MIN
GFR SERPL CREATININE-BSD FRML MDRD: NORMAL ML/MIN/{1.73_M2}
GFR SERPL CREATININE-BSD FRML MDRD: NORMAL ML/MIN/{1.73_M2}
GLUCOSE BLD-MCNC: 97 MG/DL (ref 70–99)
HCT VFR BLD CALC: 44.8 % (ref 40.7–50.3)
HEMOGLOBIN: 14.9 G/DL (ref 13–17)
IMMATURE GRANULOCYTES: 0 %
LYMPHOCYTES # BLD: 40 % (ref 24–43)
MCH RBC QN AUTO: 33.9 PG (ref 25.2–33.5)
MCHC RBC AUTO-ENTMCNC: 33.3 G/DL (ref 28.4–34.8)
MCV RBC AUTO: 102.1 FL (ref 82.6–102.9)
MONOCYTES # BLD: 8 % (ref 3–12)
NRBC AUTOMATED: 0 PER 100 WBC
PDW BLD-RTO: 11.5 % (ref 11.8–14.4)
PLATELET # BLD: 298 K/UL (ref 138–453)
PLATELET ESTIMATE: ABNORMAL
PMV BLD AUTO: 10.7 FL (ref 8.1–13.5)
POTASSIUM SERPL-SCNC: 4.4 MMOL/L (ref 3.7–5.3)
RBC # BLD: 4.39 M/UL (ref 4.21–5.77)
RBC # BLD: ABNORMAL 10*6/UL
SEG NEUTROPHILS: 48 % (ref 36–65)
SEGMENTED NEUTROPHILS ABSOLUTE COUNT: 3.29 K/UL (ref 1.5–8.1)
SODIUM BLD-SCNC: 141 MMOL/L (ref 135–144)
WBC # BLD: 6.8 K/UL (ref 3.5–11.3)
WBC # BLD: ABNORMAL 10*3/UL

## 2020-08-11 PROCEDURE — 85025 COMPLETE CBC W/AUTO DIFF WBC: CPT

## 2020-08-11 PROCEDURE — 36415 COLL VENOUS BLD VENIPUNCTURE: CPT

## 2020-08-11 PROCEDURE — 93005 ELECTROCARDIOGRAM TRACING: CPT | Performed by: SURGERY

## 2020-08-11 PROCEDURE — 80048 BASIC METABOLIC PNL TOTAL CA: CPT

## 2020-08-11 NOTE — PROGRESS NOTES
Dr. Hayder Davies talks w pt at bedside at PAT visit. Views ekg, echo and 2018 Cardiac-thoracic notes regarding his aortic aneurysm. Tells pt he needs Vascular clearance /d/t his AA before surgery. Pt to call Dr. Nicky El, phone number provided. Dr. Prabhjot Moreira office/  notified.

## 2020-08-13 ENCOUNTER — HOSPITAL ENCOUNTER (OUTPATIENT)
Dept: PREADMISSION TESTING | Age: 65
Setting detail: SPECIMEN
Discharge: HOME | End: 2020-08-17
Payer: MEDICARE

## 2020-08-13 LAB
EKG ATRIAL RATE: 63 BPM
EKG P AXIS: 22 DEGREES
EKG P-R INTERVAL: 194 MS
EKG Q-T INTERVAL: 420 MS
EKG QRS DURATION: 104 MS
EKG QTC CALCULATION (BAZETT): 429 MS
EKG R AXIS: -34 DEGREES
EKG T AXIS: -18 DEGREES
EKG VENTRICULAR RATE: 63 BPM

## 2020-08-13 PROCEDURE — U0003 INFECTIOUS AGENT DETECTION BY NUCLEIC ACID (DNA OR RNA); SEVERE ACUTE RESPIRATORY SYNDROME CORONAVIRUS 2 (SARS-COV-2) (CORONAVIRUS DISEASE [COVID-19]), AMPLIFIED PROBE TECHNIQUE, MAKING USE OF HIGH THROUGHPUT TECHNOLOGIES AS DESCRIBED BY CMS-2020-01-R: HCPCS

## 2020-08-16 LAB — SARS-COV-2, NAA: NOT DETECTED

## 2020-08-28 ENCOUNTER — OFFICE VISIT (OUTPATIENT)
Dept: FAMILY MEDICINE CLINIC | Age: 65
End: 2020-08-28
Payer: MEDICARE

## 2020-08-28 VITALS
SYSTOLIC BLOOD PRESSURE: 132 MMHG | WEIGHT: 238 LBS | BODY MASS INDEX: 39.61 KG/M2 | DIASTOLIC BLOOD PRESSURE: 84 MMHG | OXYGEN SATURATION: 96 % | TEMPERATURE: 97.3 F | HEART RATE: 78 BPM

## 2020-08-28 PROBLEM — F11.21 HISTORY OF NARCOTIC ADDICTION (HCC): Status: RESOLVED | Noted: 2017-06-27 | Resolved: 2020-08-28

## 2020-08-28 PROCEDURE — 99214 OFFICE O/P EST MOD 30 MIN: CPT | Performed by: NURSE PRACTITIONER

## 2020-08-28 PROCEDURE — 3023F SPIROM DOC REV: CPT | Performed by: NURSE PRACTITIONER

## 2020-08-28 PROCEDURE — 3017F COLORECTAL CA SCREEN DOC REV: CPT | Performed by: NURSE PRACTITIONER

## 2020-08-28 PROCEDURE — G8417 CALC BMI ABV UP PARAM F/U: HCPCS | Performed by: NURSE PRACTITIONER

## 2020-08-28 PROCEDURE — 1036F TOBACCO NON-USER: CPT | Performed by: NURSE PRACTITIONER

## 2020-08-28 PROCEDURE — G8427 DOCREV CUR MEDS BY ELIG CLIN: HCPCS | Performed by: NURSE PRACTITIONER

## 2020-08-28 PROCEDURE — 4040F PNEUMOC VAC/ADMIN/RCVD: CPT | Performed by: NURSE PRACTITIONER

## 2020-08-28 PROCEDURE — 1123F ACP DISCUSS/DSCN MKR DOCD: CPT | Performed by: NURSE PRACTITIONER

## 2020-08-28 PROCEDURE — G8926 SPIRO NO PERF OR DOC: HCPCS | Performed by: NURSE PRACTITIONER

## 2020-08-28 ASSESSMENT — ENCOUNTER SYMPTOMS
BACK PAIN: 0
COUGH: 1
SORE THROAT: 0
ABDOMINAL DISTENTION: 0
ABDOMINAL PAIN: 0
CHEST TIGHTNESS: 0
DIARRHEA: 0
RHINORRHEA: 0
SHORTNESS OF BREATH: 1
CONSTIPATION: 0
VOMITING: 0
NAUSEA: 0

## 2020-08-28 NOTE — PROGRESS NOTES
Prasad Livingston, APRN-CNP  [facility]  [id number], [address]  [address] [address]  Dept: [phone]  Dept Fax: [phone]     Patient ID: Sandy Carbajal is a 72 y.o. male. HPI    Pt here today for f/u on chronic medical problems HTN, COPD, Asthma, allergies, OLIVIA, go over labs and/or diagnostic studies, and medication refills. Pt denies any fever or chills. Pt today denies any HA, chest pain, or SOB. Pt denies any N/V/D/C or abdominal pain. Pt states his allergies are stable on meds. Pt stable on CPAP with his sleep apnea and has been using nightly. He relates that he has had some increasing shortness of breath the last 4 days. He is coughing up clear white. He also relates that he has not taken his aerosol treatments in the last 4 days. His tubing broke and he didn't know where to go to get it replaced. The patient's past medical, surgical, social, and family history as well as his current medications and allergies were reviewed as documented in today's encounter by ENMA Zazueta.        Current Outpatient Medications on File Prior to Visit   Medication Sig Dispense Refill    amLODIPine (NORVASC) 10 MG tablet Take 1 tablet by mouth daily 30 tablet 0    albuterol (PROVENTIL) (2.5 MG/3ML) 0.083% nebulizer solution Take 2.5 mg by nebulization every 6 hours as needed for Wheezing or Shortness of Breath      guaiFENesin (MUCINEX) 600 MG extended release tablet Take 1-2 tablets by mouth 2 times daily as needed for Congestion 60 tablet 0    aspirin EC 81 MG EC tablet Take 1 tablet by mouth daily 90 tablet 0    cetirizine (ZYRTEC) 10 MG tablet Take 1 tablet by mouth nightly as needed for Allergies or Rhinitis 90 tablet 1    albuterol sulfate HFA (PROAIR HFA) 108 (90 Base) MCG/ACT inhaler Inhale 2 puffs into the lungs every 4 hours as needed for Wheezing or Shortness of Breath 3 Inhaler 1    fluticasone (FLONASE) 50 MCG/ACT nasal spray 1-2 sprays by Nasal route daily as needed for Rhinitis or Allergies 3 Bottle 1     No current facility-administered medications on file prior to visit. Subjective:     Review of Systems   Constitutional: Negative for activity change, fatigue and fever. HENT: Negative for congestion, ear pain, rhinorrhea and sore throat. Respiratory: Positive for cough (occasional with  white mucous) and shortness of breath (occasional). Negative for chest tightness. Cardiovascular: Negative for chest pain and palpitations. Gastrointestinal: Negative for abdominal distention, abdominal pain, constipation, diarrhea, nausea and vomiting. Endocrine: Negative for polydipsia, polyphagia and polyuria. Genitourinary: Negative for difficulty urinating and dysuria. Musculoskeletal: Negative for arthralgias, back pain and myalgias. Skin: Negative for rash. Allergic/Immunologic: Positive for environmental allergies (stable). Neurological: Negative for dizziness, weakness, light-headedness and headaches. Hematological: Negative for adenopathy. Psychiatric/Behavioral: Negative for agitation and behavioral problems. The patient is not nervous/anxious. Objective:     Physical Exam  Vitals signs reviewed. Constitutional:       General: He is not in acute distress. Appearance: Normal appearance. HENT:      Head: Normocephalic and atraumatic. Right Ear: External ear normal.      Left Ear: External ear normal.      Nose: Nose normal.      Mouth/Throat:      Mouth: Mucous membranes are moist.      Pharynx: No oropharyngeal exudate or posterior oropharyngeal erythema. Eyes:      Extraocular Movements: Extraocular movements intact. Conjunctiva/sclera: Conjunctivae normal.      Pupils: Pupils are equal, round, and reactive to light. Neck:      Musculoskeletal: Normal range of motion. Cardiovascular:      Rate and Rhythm: Normal rate and regular rhythm. Pulses: Normal pulses.       Heart sounds: No murmur. Pulmonary:      Effort: Pulmonary effort is normal. No tachypnea, accessory muscle usage or respiratory distress. Breath sounds: Normal breath sounds. No decreased air movement. No wheezing, rhonchi or rales. Comments: Coarse Rhonchi. .. cleared with cough. Otherwise good air exchange. Abdominal:      General: Bowel sounds are normal. There is no distension. Palpations: Abdomen is soft. Musculoskeletal: Normal range of motion. Skin:     General: Skin is warm and dry. Findings: No rash. Neurological:      General: No focal deficit present. Mental Status: He is alert and oriented to person, place, and time. Deep Tendon Reflexes: Reflexes normal.   Psychiatric:         Behavior: Behavior normal.       Assessment:      Diagnosis Orders   1. Essential hypertension  CBC    Comprehensive Metabolic Panel    Lipid Panel   2. Hyperglycemia  Hemoglobin A1C   3. Prostate cancer screening  PSA, Diagnostic   4. Left ventricular diastolic dysfunction     5. Chronic obstructive pulmonary disease with acute exacerbation ([facility] [address]     6. Mild intermittent asthma without complication     7. Seasonal allergic rhinitis, unspecified trigger     8. OLIVIA on CPAP       Plan:     1. Essential hypertension  - Stable: Medication re-filled as needed, con't medications as prescribed, con't current tx plan  - Continue Amlodipine 10mg as previously prescribed  - Education provided on maintaining a low sodium diet and routine exercise. - Advised to seek emergent tx if SBP>180 and/or DBP>110, any chest pain, h/a or vision changes   - CBC; Future  - Comprehensive Metabolic Panel; Future  - Lipid Panel; Future    2. Hyperglycemia  - Advised to decrease, fats, carbohydrates, and engage in a healthier diet overall, as well as routine exercise. - Encouraged 40 minutes of aerobic exercise- moderate to vigorous intensity three to four times a week. - Hemoglobin A1C; Future  - Lipid Panel;  Future  - Hemoglobin A1C; Future    3. Prostate cancer screening  - PSA, Diagnostic; Future    4. Left ventricular diastolic dysfunction  - Stable: Medication re-filled as needed, con't medications as prescribed, con't current tx plan  - Last echo 1/2020 with EF of 55%  - Low NA diet  - Patient instructed to call if CP, SOB, increased weight gain, increased edema or new onset congested cough. Patient verbalizes understanding.  - Cont with increase exercise and aim for 150 minutes of aerobic activity per week    5. Chronic obstructive pulmonary disease with acute exacerbation (HCC)  - Stable: Medication re-filled as needed, con't medications as prescribed, con't current tx plan  - Continue with Albuterol as previously prescribed. - Continue with Albuterol rescue inhaler as previously prescribed. -  Arranged for patient to  more tubing for nebulizer at local DME. Patient given address to DME here in [place]  - Smoking cessation    6. Mild intermittent asthma without complication  - Stable: Medication re-filled as needed, con't medications as prescribed, con't current tx plan  - Continue Albuterol as previously prescribed. - Continue Albuterol, rescue inhaler, as previously prescribed. - Avoid triggers that may exacerbate symptoms. 7. Seasonal allergic rhinitis, unspecified trigger  - Stable: Medication re-filled as needed, con't medications as prescribed, con't current tx plan  - Continue Flonase as previously prescribed  - Continue Zyrtec as previously prescribed    8. OLIVIA on CPAP  - Stable: Medication re-filled as needed, con't medications as prescribed, con't current tx plan  - Pt is consistent with wearing her CPAP at night.    - Rest of systems unchanged, continue current treatments. - Medications, labs, diagnostic studies, consultations and follow-up as documented in this encounter.  Rest of systems unchanged, continue current treatments    ADONAY Costa-CNP

## 2020-09-01 ENCOUNTER — TELEPHONE (OUTPATIENT)
Dept: SURGERY | Age: 65
End: 2020-09-01

## 2020-09-01 NOTE — TELEPHONE ENCOUNTER
Tried to call patient to reschedule his surgery from 8/31/20. No answer and his  is not yet set up so could no leave message.

## 2020-10-15 RX ORDER — ASPIRIN 81 MG/1
TABLET, COATED ORAL
Qty: 30 TABLET | Refills: 5 | Status: SHIPPED | OUTPATIENT
Start: 2020-10-15 | End: 2021-01-25 | Stop reason: SDUPTHER

## 2020-10-19 ENCOUNTER — OFFICE VISIT (OUTPATIENT)
Dept: FAMILY MEDICINE CLINIC | Age: 65
End: 2020-10-19
Payer: COMMERCIAL

## 2020-10-19 VITALS
WEIGHT: 238 LBS | BODY MASS INDEX: 39.61 KG/M2 | HEART RATE: 56 BPM | TEMPERATURE: 97.9 F | OXYGEN SATURATION: 99 % | RESPIRATION RATE: 16 BRPM | DIASTOLIC BLOOD PRESSURE: 80 MMHG | SYSTOLIC BLOOD PRESSURE: 130 MMHG

## 2020-10-19 PROCEDURE — 90694 VACC AIIV4 NO PRSRV 0.5ML IM: CPT | Performed by: NURSE PRACTITIONER

## 2020-10-19 PROCEDURE — G0008 ADMIN INFLUENZA VIRUS VAC: HCPCS | Performed by: NURSE PRACTITIONER

## 2020-10-19 PROCEDURE — 99213 OFFICE O/P EST LOW 20 MIN: CPT | Performed by: NURSE PRACTITIONER

## 2020-10-19 RX ORDER — BUDESONIDE AND FORMOTEROL FUMARATE DIHYDRATE 80; 4.5 UG/1; UG/1
2 AEROSOL RESPIRATORY (INHALATION) 2 TIMES DAILY
Qty: 1 INHALER | Refills: 1 | Status: SHIPPED | OUTPATIENT
Start: 2020-10-19 | End: 2021-01-25 | Stop reason: SDUPTHER

## 2020-10-19 ASSESSMENT — ENCOUNTER SYMPTOMS
COUGH: 1
CONSTIPATION: 0
CHEST TIGHTNESS: 0
VOMITING: 0
ABDOMINAL DISTENTION: 0
WHEEZING: 0
DIARRHEA: 0
SHORTNESS OF BREATH: 1
SORE THROAT: 0
BACK PAIN: 0
NAUSEA: 0
ABDOMINAL PAIN: 0
RHINORRHEA: 0

## 2020-10-19 NOTE — PROGRESS NOTES
Mason Arroyo, [facility]  [address] [facility]  [address] [address], [address]  [place] [address]  Dept: [phone]  Dept Fax: [phone]     Patient ID: Skye Hester is a 72 y.o. male. HPI    Pt here today for an acute visit secondary to pain in his groin. He relates that he has a hernia and was scheduled to have it repaired but had to cancel due to court. He was scheduled the end of August with Dr. Manuel Fontenot. Per patient, he is not able to reduce the hernia anymore and the more activity he does, the more pain it causes. Pt denies any fever or chills. Pt today denies any HA, chest pain, or SOB. Pt denies any N/V/D/C or abdominal pain. He also complains of shortness of breath. He has been using his albuterol inhaler 4-6 times/day. He is not on a long acting bronchodilator. Otherwise pt doing well on current tx and no other concerns today. The patient's past medical, surgical, social, and family history as well as his current medications and allergies were reviewed as documented in today's encounter by ENMA Mehta.        Current Outpatient Medications on File Prior to Visit   Medication Sig Dispense Refill    ASPIRIN LOW DOSE 81 MG EC tablet take 1 tablet by mouth once daily 30 tablet 5    amLODIPine (NORVASC) 10 MG tablet Take 1 tablet by mouth daily 30 tablet 0    albuterol (PROVENTIL) (2.5 MG/3ML) 0.083% nebulizer solution Take 2.5 mg by nebulization every 6 hours as needed for Wheezing or Shortness of Breath      cetirizine (ZYRTEC) 10 MG tablet Take 1 tablet by mouth nightly as needed for Allergies or Rhinitis 90 tablet 1    albuterol sulfate HFA (PROAIR HFA) 108 (90 Base) MCG/ACT inhaler Inhale 2 puffs into the lungs every 4 hours as needed for Wheezing or Shortness of Breath 3 Inhaler 1    fluticasone (FLONASE) 50 MCG/ACT nasal spray 1-2 sprays by Nasal route daily as needed for Rhinitis or Allergies 3 Bottle 1    guaiFENesin (MUCINEX) 600 MG extended release tablet Take 1-2 tablets by mouth 2 times daily as needed for Congestion (Patient not taking: Reported on 10/19/2020) 60 tablet 0     No current facility-administered medications on file prior to visit. Subjective:     Review of Systems   Constitutional: Negative for activity change, fatigue and fever (denies). HENT: Negative for congestion, ear pain, rhinorrhea and sore throat. Respiratory: Positive for cough and shortness of breath. Negative for chest tightness and wheezing. Cardiovascular: Negative for chest pain and palpitations. Gastrointestinal: Negative for abdominal distention, abdominal pain, constipation, diarrhea, nausea and vomiting. Endocrine: Negative for polydipsia, polyphagia and polyuria. Genitourinary: Negative for difficulty urinating and dysuria. Left inguinal hernia   Musculoskeletal: Negative for arthralgias, back pain and myalgias. Skin: Negative for rash. Neurological: Negative for dizziness, weakness, light-headedness and headaches. Hematological: Negative for adenopathy. Psychiatric/Behavioral: Negative for agitation and behavioral problems. The patient is not nervous/anxious. Objective:     Physical Exam  Vitals signs reviewed. Constitutional:       General: He is not in acute distress. Appearance: Normal appearance. He is well-developed. He is obese. HENT:      Head: Normocephalic and atraumatic. Right Ear: Hearing and external ear normal.      Left Ear: Hearing and external ear normal.      Nose: Nose normal. No congestion. Right Sinus: No maxillary sinus tenderness or frontal sinus tenderness. Left Sinus: No maxillary sinus tenderness or frontal sinus tenderness. Mouth/Throat:      Lips: Pink. No lesions. Mouth: Mucous membranes are moist. No oral lesions. Tongue: No lesions. Pharynx: Oropharynx is clear. No oropharyngeal exudate or posterior oropharyngeal erythema.    Eyes: Extraocular Movements: Extraocular movements intact. Conjunctiva/sclera: Conjunctivae normal.      Pupils: Pupils are equal, round, and reactive to light. Neck:      Musculoskeletal: Full passive range of motion without pain and normal range of motion. Thyroid: No thyroid mass. Cardiovascular:      Rate and Rhythm: Normal rate and regular rhythm. Pulses: Normal pulses. Heart sounds: Normal heart sounds. No murmur. Pulmonary:      Effort: Pulmonary effort is normal. No respiratory distress. Breath sounds: Normal air entry. Decreased breath sounds and rhonchi (clears with cough) present. No wheezing or rales. Abdominal:      General: Bowel sounds are normal. There is no distension. Palpations: Abdomen is soft. Tenderness: There is no abdominal tenderness. Hernia: A hernia is present. Hernia is present in the left inguinal area. Genitourinary:     Scrotum/Testes:         Left: Tenderness present. Musculoskeletal: Normal range of motion. Right lower leg: No edema. Left lower leg: No edema. Lymphadenopathy:      Cervical: No cervical adenopathy. Skin:     General: Skin is warm and dry. Capillary Refill: Capillary refill takes less than 2 seconds. Findings: No rash. Neurological:      General: No focal deficit present. Mental Status: He is alert and oriented to person, place, and time. Deep Tendon Reflexes: Reflexes normal.   Psychiatric:         Attention and Perception: Attention and perception normal.         Mood and Affect: Mood and affect normal.         Speech: Speech normal.         Behavior: Behavior normal. Behavior is cooperative. Cognition and Memory: Memory normal.         Assessment:      Diagnosis Orders   1. Left inguinal hernia     2. Mild intermittent asthma without complication     3. Flu vaccine need  INFLUENZA, QUADV, ADJUVANTED, 65 YRS =, IM, PF, PREFILL SYR, 0.5ML (FLUAD)       Plan:     1.  Left inguinal hernia  - I did tell him he needs to reschedule the hernia repair with Dr. Luis Murillo    2. Mild intermittent asthma without complication  - Asthma vs COPD ?   - Last PFT was in 2017 and was negative  - PFT reordered and not completed yet  - Discussed importance of PFT with patient  - Will add Symbicort    3. Flu vaccine need  - After obtaining informed consent, the immunization is given by ENMA Hernandez.   - Patient tolerated well  - INFLUENZA, QUADV, ADJUVANTED, 65 YRS =, IM, PF, PREFILL SYR, 0.5ML (FLUAD)    - Rest of systems unchanged, continue current treatments. - Medications, labs, diagnostic studies, consultations and follow-up as documented in this encounter.  Rest of systems unchanged, continue current treatments    ADONAY Gill-CNP

## 2020-10-19 NOTE — PROGRESS NOTES
Visit Information    Have you changed or started any medications since your last visit including any over-the-counter medicines, vitamins, or herbal medicines? no   Have you stopped taking any of your medications? Is so, why? -  no  Are you having any side effects from any of your medications? - no    Have you seen any other physician or provider since your last visit?  no   Have you had any other diagnostic tests since your last visit?  no   Have you been seen in the emergency room and/or had an admission in a hospital since we last saw you?  no   Have you had your routine dental cleaning in the past 6 months?  no     Do you have an active MyChart account? If no, what is the barrier?   Yes    Patient Care Team:  ADONAY Wasserman NP as PCP - General (Nurse Practitioner)  ADONAY Wasserman NP as PCP - [facility] Provider  Emili Ponce MD as Consulting Physician (Pulmonology)  Shannan Youssef MD as Consulting Physician (Gastroenterology)    Medical History Review  Past Medical, Family, and Social History reviewed and does contribute to the patient presenting condition    Health Maintenance   Topic Date Due    DTaP/Tdap/Td vaccine (1 - Tdap) 01/31/1974    Flu vaccine (1) 09/01/2020    Shingles Vaccine (2 of 2) 09/07/2020    Annual Wellness Visit (AWV)  10/02/2020    Potassium monitoring  08/11/2021    Creatinine monitoring  08/11/2021    Lipid screen  03/31/2022    Pneumococcal 65+ years Vaccine (2 of 2 - PPSV23) 10/02/2022    Colon cancer screen colonoscopy  07/10/2025    Hepatitis C screen  Completed    HIV screen  Completed    Hepatitis A vaccine  Aged Out    Hepatitis B vaccine  Aged Out    Hib vaccine  Aged Out    Meningococcal (ACWY) vaccine  Aged Out

## 2020-11-05 ENCOUNTER — TELEPHONE (OUTPATIENT)
Dept: FAMILY MEDICINE CLINIC | Age: 65
End: 2020-11-05

## 2020-11-05 NOTE — TELEPHONE ENCOUNTER
Patient called [facility] to schedule his surgery for an inguinal hernia with Dr. Alejo Sanchez. I advised that Dr. Alejo Sanchez is no longer scheduling surgeries that are a 90 day global as he is leaving [facility]. Advised that patient is to call PCP to get referral to another surgeon.

## 2020-11-05 NOTE — TELEPHONE ENCOUNTER
Please let patient know I have put in new referral for hernia repair, they should call to schedule but if not here is contact information.    [facility] Violeta Brewer, [address] [facility]   [address]   [phone]

## 2020-11-05 NOTE — TELEPHONE ENCOUNTER
Pt called to schedule hernia surgery with Dr Masoud Kaplan but I was told that he is no longer taking referrals. Pt will need a new referral for Dr. Panfilo Cochran. Please issue new referral and advise pt when ready.

## 2020-11-13 ENCOUNTER — INITIAL CONSULT (OUTPATIENT)
Dept: BARIATRICS/WEIGHT MGMT | Age: 65
End: 2020-11-13
Payer: COMMERCIAL

## 2020-11-13 VITALS
RESPIRATION RATE: 20 BRPM | HEIGHT: 65 IN | DIASTOLIC BLOOD PRESSURE: 78 MMHG | WEIGHT: 233.9 LBS | SYSTOLIC BLOOD PRESSURE: 128 MMHG | HEART RATE: 70 BPM | BODY MASS INDEX: 38.97 KG/M2

## 2020-11-13 PROCEDURE — G8427 DOCREV CUR MEDS BY ELIG CLIN: HCPCS | Performed by: SURGERY

## 2020-11-13 PROCEDURE — G8417 CALC BMI ABV UP PARAM F/U: HCPCS | Performed by: SURGERY

## 2020-11-13 PROCEDURE — 4040F PNEUMOC VAC/ADMIN/RCVD: CPT | Performed by: SURGERY

## 2020-11-13 PROCEDURE — G8484 FLU IMMUNIZE NO ADMIN: HCPCS | Performed by: SURGERY

## 2020-11-13 PROCEDURE — 3017F COLORECTAL CA SCREEN DOC REV: CPT | Performed by: SURGERY

## 2020-11-13 PROCEDURE — 99204 OFFICE O/P NEW MOD 45 MIN: CPT | Performed by: SURGERY

## 2020-11-13 PROCEDURE — 1036F TOBACCO NON-USER: CPT | Performed by: SURGERY

## 2020-11-13 PROCEDURE — 1123F ACP DISCUSS/DSCN MKR DOCD: CPT | Performed by: SURGERY

## 2020-11-23 NOTE — PROGRESS NOTES
[facility]  [facility]  [address] [place] [address] [place]  [address]  [facility] [address]  Dept: [phone]    ROBOTIC & MINIMALLY INVASIVE SURGERY  PROGRESS NOTE INITIAL EVALUATION     Patient: Nolvia Michelle        Service Date: 11/13/2020      HPI:     Chief Complaint   Patient presents with    Inguinal Hernia    Surgical Consult       The patient is a pleasant 72y.o. year old male  with a possible left inguinal hernia, who stands Height: 5' 5\" (165.1 cm) tall with a weight of Weight: 233 lb 14.4 oz (106.1 kg) , resulting in a BMI of Body mass index is 38.92 kg/m². He states he has pain in his left groin and this is worse over the past few months. He also notes an abnormality of the left side of his penis. He would like further evaluation. He does complain of hematuria. He reportedly had seen Dr. Sydnee Orozco regarding hernia repair. He denies diarrhea, constipation, melena or hematochezia. He states he has seen urology in the past regarding the hematuria. The patient denies  a history of myocardial infarction, deep vein thrombosis, pulmonary embolism, renal failure, hepatic failure and stroke.     Medical History:  Past Medical History:   Diagnosis Date    Acute hepatitis     secondary to Mobic medication effects pt states    Aneurysm of ascending aorta ([address]     Asthma     Epilepsia ([address]     last seizure known was when he was 32years old    Family history of brain cancer 10/2/2017    Hematuria 6/30/2015    History of total knee replacement 2/16/2017    Hypertension     Osteoarthritis     Thoracic degenerative disc disease 12/21/2017       Surgical History:  Past Surgical History:   Procedure Laterality Date    COLONOSCOPY  07/10/2015    DR NEELIMA GALARZA - diverticula    INGUINAL HERNIA REPAIR Right     JOINT REPLACEMENT Left 01/03/2018    JOINT REPLACEMENT Left 01/03/2017    TOTAL KNEE ARTHROPLASTY  11/02/2016    right       Family History:      Problem Relation Age of Onset    Heart Disease Father         chf    Cancer Sister         Brain Cancer       Social History:   Social History     Tobacco Use    Smoking status: Never Smoker    Smokeless tobacco: Never Used   Substance Use Topics    Alcohol use: No     Alcohol/week: 0.0 standard drinks    Drug use: No       Current Med List:  Current Outpatient Medications   Medication Sig Dispense Refill    budesonide-formoterol (SYMBICORT) 80-4.5 MCG/ACT AERO Inhale 2 puffs into the lungs 2 times daily 1 Inhaler 1    ASPIRIN LOW DOSE 81 MG EC tablet take 1 tablet by mouth once daily 30 tablet 5    amLODIPine (NORVASC) 10 MG tablet Take 1 tablet by mouth daily 30 tablet 0    albuterol (PROVENTIL) (2.5 MG/3ML) 0.083% nebulizer solution Take 2.5 mg by nebulization every 6 hours as needed for Wheezing or Shortness of Breath      guaiFENesin (MUCINEX) 600 MG extended release tablet Take 1-2 tablets by mouth 2 times daily as needed for Congestion 60 tablet 0    cetirizine (ZYRTEC) 10 MG tablet Take 1 tablet by mouth nightly as needed for Allergies or Rhinitis 90 tablet 1    albuterol sulfate HFA (PROAIR HFA) 108 (90 Base) MCG/ACT inhaler Inhale 2 puffs into the lungs every 4 hours as needed for Wheezing or Shortness of Breath 3 Inhaler 1    fluticasone (FLONASE) 50 MCG/ACT nasal spray 1-2 sprays by Nasal route daily as needed for Rhinitis or Allergies 3 Bottle 1     No current facility-administered medications for this visit. Allergies   Allergen Reactions    Mobic [Meloxicam]      Patient was hospitalized after taking this medication. Affected liver/  Caused bleeding       SOCIAL:      This patient is alone for the evaluation today.       [] HIV Risk Factors (i.e.) intravenous drug abuser; at risk sexual behavior; received blood products    [] TB Risk Factors (i.e.) Medically underserved, institutional care, foreign born, endemic area; exposure to active case    [] Hepatitis B&C Risk Factors (i.e.) Received blood transfusion prior to 1992; recreational drug use; high risk sexual behaviors; tattoos or body piercings; contact with blood or needle sticks in the workplace    Comprehension    Ability to grasp concepts and respond to questions:   [x] High   [] Medium   [] Low    Motivation    [x] Asks Questions; eager to learn   [] Needs education   [] Extreme anxiety    [] uncooperative   [] Denies need for education    English Speaking Ability    [x] Speaks English well   [] Reads [place] well   [] Understands spoken [place] Cancer    [] Understands written English   [] No need for interpretive support      [] Might benefit from interpretive support   []  required for all services     REVIEW OF SYSTEMS: (Negative unless marked otherwise)       Do you or have you had any of the following?   Cardiovascular YES NO Respiratory YES NO   High Blood Pressure   [x]   [] COPD   []   []   Heart Attack   []   [] TB/Positive skin Test   []   []   Congestive Heart Failure   []   [] Obstructive Sleep Apnea   [x]   []   Coronary Artery Disease   []   [] Asthma   [x]   []   Circulation Problems   []   []      Activity Intolerance   []   [] Gastrointestinal YES NO   Peripheral Vascular Disease   []   [] Gastric Problems   []   []        Colorectal problems   []   []   Hematological YES NO Ulcer disease   []   []   Bleeding Tendencies   []   [] Liver disease   []   []   Blood Transfusion last 30d   []   [] Gallstones   []   []   Anemia   []   [] Refulx or Heartburn   [x]   []   Blood Clots   []   []      High Cholesterol   []   [] Muscoloskeletal YES NO   High Triglycerides   []   [] Joint Limitations   []   []      Muscle Weakness   []   []   Eyes, Ears, Nose, Throat YES NO Multiple Sclerosis   []   []   Cataracts   []   [] Arthritis   [x]   []   Glasses   [x]   []      Blurred Vision   [x]   [] Cancer   []   []   Hearing Aids   []   [] Type:     Ringing in Ears   []   []      Difficulty Swallowing   []   [] Encodrine YES NO      Diabetes

## 2020-11-30 PROBLEM — N28.1 RENAL CYST, RIGHT: Status: ACTIVE | Noted: 2018-01-09

## 2020-11-30 PROBLEM — R35.1 NOCTURIA: Status: ACTIVE | Noted: 2020-11-30

## 2020-12-11 ENCOUNTER — TELEPHONE (OUTPATIENT)
Dept: BARIATRICS/WEIGHT MGMT | Age: 65
End: 2020-12-11

## 2020-12-11 NOTE — TELEPHONE ENCOUNTER
Seen urology on 11/30/2020 , per dr Karson Ramírez :     He has a hard nodule on his left proximal penis, this seems to be the source of his pain. He also has a history of hematuria. Will send to urology for further evaluation prior to consideration for hernia repair. CT confirms hernia, performed in 2018  Will plan for future repair after evaluation of hematuria by urology  All questions answered. Follow up after urology evaluation      Patient has follow up with DR Abel Swift on 1/15/21.

## 2020-12-14 PROBLEM — N48.6 PEYRONIE DISEASE: Status: ACTIVE | Noted: 2020-12-14

## 2021-01-15 ENCOUNTER — OFFICE VISIT (OUTPATIENT)
Dept: BARIATRICS/WEIGHT MGMT | Age: 66
End: 2021-01-15
Payer: COMMERCIAL

## 2021-01-15 VITALS
WEIGHT: 232 LBS | RESPIRATION RATE: 18 BRPM | SYSTOLIC BLOOD PRESSURE: 126 MMHG | HEIGHT: 65 IN | HEART RATE: 68 BPM | TEMPERATURE: 97 F | BODY MASS INDEX: 38.65 KG/M2 | DIASTOLIC BLOOD PRESSURE: 86 MMHG

## 2021-01-15 DIAGNOSIS — K40.90 LEFT INGUINAL HERNIA: Primary | ICD-10-CM

## 2021-01-15 PROCEDURE — 99213 OFFICE O/P EST LOW 20 MIN: CPT | Performed by: SURGERY

## 2021-01-24 NOTE — PROGRESS NOTES
[facility]  [facility] INVASIVE BARIATRIC SURG  [address] [place] [address] [place]  [address]  [facility] [address]  Dept: [phone]    ROBOTIC & MINIMALLY INVASIVE SURGERY  PROGRESS NOTE      Patient: Judi Koch        Service Date: 1/15/2021      HPI:     Chief Complaint   Patient presents with    Inguinal Hernia     2 month follow up inguinal hernia       The patient is a pleasant 72y.o. year old male  with a possible left inguinal hernia, who stands Height: 5' 5\" (165.1 cm) tall with a weight of Weight: 232 lb (105.2 kg) , resulting in a BMI of Body mass index is 38.61 kg/m². He states he has pain in his left groin and this is worse over the past few months. He also notes an abnormality of the left side of his penis. He would like further evaluation. He does complain of hematuria. He reportedly had seen Dr. Petty Patel regarding hernia repair. He denies diarrhea, constipation, melena or hematochezia. He states he has seen urology in the past regarding the hematuria. He was re evaluated by urology. He was felt to be stable for consideration for hernia repair. He continues to have some pain in his left groin.     Medical History:  Past Medical History:   Diagnosis Date    Acute hepatitis     secondary to Mobic medication effects pt states    Aneurysm of ascending aorta ([address]     Asthma     Epilepsia ([address]     last seizure known was when he was 32years old    Family history of brain cancer 10/2/2017    Hematuria 6/30/2015    History of total knee replacement 2/16/2017    Hypertension     Osteoarthritis     Thoracic degenerative disc disease 12/21/2017       Surgical History:  Past Surgical History:   Procedure Laterality Date    COLONOSCOPY  07/10/2015    DR NEELIMA GALARZA - diverticula    INGUINAL HERNIA REPAIR Right     JOINT REPLACEMENT Left 01/03/2018    JOINT REPLACEMENT Left 01/03/2017    TOTAL KNEE ARTHROPLASTY  11/02/2016    right       Family History: Problem Relation Age of Onset    Heart Disease Father         chf    Cancer Sister         Brain Cancer       Social History:   Social History     Tobacco Use    Smoking status: Never Smoker    Smokeless tobacco: Never Used   Substance Use Topics    Alcohol use: No     Alcohol/week: 0.0 standard drinks    Drug use: No       Current Med List:  Current Outpatient Medications   Medication Sig Dispense Refill    budesonide-formoterol (SYMBICORT) 80-4.5 MCG/ACT AERO Inhale 2 puffs into the lungs 2 times daily 1 Inhaler 1    ASPIRIN LOW DOSE 81 MG EC tablet take 1 tablet by mouth once daily 30 tablet 5    amLODIPine (NORVASC) 10 MG tablet Take 1 tablet by mouth daily 30 tablet 0    albuterol (PROVENTIL) (2.5 MG/3ML) 0.083% nebulizer solution Take 2.5 mg by nebulization every 6 hours as needed for Wheezing or Shortness of Breath      guaiFENesin (MUCINEX) 600 MG extended release tablet Take 1-2 tablets by mouth 2 times daily as needed for Congestion 60 tablet 0    cetirizine (ZYRTEC) 10 MG tablet Take 1 tablet by mouth nightly as needed for Allergies or Rhinitis 90 tablet 1    albuterol sulfate HFA (PROAIR HFA) 108 (90 Base) MCG/ACT inhaler Inhale 2 puffs into the lungs every 4 hours as needed for Wheezing or Shortness of Breath 3 Inhaler 1    fluticasone (FLONASE) 50 MCG/ACT nasal spray 1-2 sprays by Nasal route daily as needed for Rhinitis or Allergies 3 Bottle 1     No current facility-administered medications for this visit. Allergies   Allergen Reactions    Mobic [Meloxicam]      Patient was hospitalized after taking this medication. Affected liver/  Caused bleeding       SOCIAL:      This patient is alone for the evaluation today.       [] HIV Risk Factors (i.e.) intravenous drug abuser; at risk sexual behavior; received blood products    [] TB Risk Factors (i.e.) Medically underserved, institutional care, foreign born, endemic area; exposure to active case [] Hepatitis B&C Risk Factors (i.e.) Received blood transfusion prior to 1992; recreational drug use; high risk sexual behaviors; tattoos or body piercings; contact with blood or needle sticks in the workplace    Comprehension    Ability to grasp concepts and respond to questions:   [x] High   [] Medium   [] Low    Motivation    [x] Asks Questions; eager to learn   [] Needs education   [] Extreme anxiety    [] uncooperative   [] Denies need for education    Review of Systems:    General  Negative for: [] Weight Change   [x] Fatigue   [x] Fevers & Chills    [] Appetite change [] Other:    Positive for: [] Weight Change   [] Fatigue   [] Fevers & Chills    [] Appetite change [] Other:   Cardiac  Negative for: [x] Chest Pain   [x] Difficulty Breathing   [] Leg Cramps [x] Edema of Lower Extremeties    [] Left   [] Right      Positive for: [] Chest Pain   [] Difficulty Breathing   [] Leg Cramps [] Edema of Lower Extremeties    [] Left   [] Right   Pulmonary  Negative for: [x] Shortness of Breath [x] Wheeze [] Cough  [] Calf Pain     Positive for: [] Shortness of Breath [] Wheeze [] Cough  [] Calf Pain   Gastro-Intestinal Negative for: [] Heartburn   [] Reflux   [] Dysphagia   [] Melena   [] BRBPR  [x] Vomiting   [x] Abdominal Pain   [] Diarrhea  [] Hernia    [x] Constipation  [] Other:     Positive for: [] Heartburn   [] Reflux   [] Dysphagia   [] Melena   [] BRBPR  [] Vomiting   [] Abdominal Pain   [] Diarrhea  [] Hernia    [] Constipation  [] Other:    Muskuloskeletal Negative for: [] Joint pain   [] Back pain   [] Knee Pain   [x] Muscle weakness   [x] Edema [] Other:     Positive for: [] Joint pain   [] Back pain   [] Knee Pain   [] Muscle weakness  [] Edema [] Other:    Neurologic Negative for: [x] Syncope   [x] Insomnia   [] Being treated for depression  [] Other:     Positive for: [] Syncope   [] Insomnia   [] Being treated for depression  [] Other: Skin Negative for: [x] Wound:   [] Open   [] Draining   [] Incisional     [x] Rash   [] Hair Loss  [] Other:     Positive for: [] Wound:   [] Open   [] Draining    [] Incisional  [] Rash   [] Hair Loss  [] Other:          PHYSICAL EXAMINATION:      /86 (Site: Right Upper Arm, Position: Sitting, Cuff Size: Large Adult)   Pulse 68   Temp 97 °F (36.1 °C)   Resp 18   Ht 5' 5\" (1.651 m)   Wt 232 lb (105.2 kg)   BMI 38.61 kg/m²   Constitutional:  Vital signs are normal. The patient appears well-developed and well-nourished. HEENT:   Head: Normocephalic. Atraumatic  Eyes: pupils are equal and reactive. No scleral icterus is present. Neck: No mass and no thyromegaly present. Cardiovascular: Normal rate, regular rhythm, S1 normal and S2 normal.  Radial pulses present   Pulmonary/Chest: Effort normal and breath sounds normal. No retractions  Abdominal: Soft. Normal appearance. There is no organomegaly. No tenderness. There is no rigidity, no rebound, no guarding and no Wen's sign. Musculoskeletal:        Right lower leg: Normal. No tenderness and no edema. Left lower leg: Normal. No tenderness and no edema. Left inguinal hernia, possible right, reducible  Neurological: The patient is alert and oriented. Moving all 4 extremities, sensation grossly intact bilateral  Skin: Skin is warm, dry and intact. Psychiatric: The patient has a normal mood and affect. Speech is normal and behavior is normal. Judgment and thought content normal. Cognition and memory are normal.       PLAN:       Diagnosis Orders   1. Left inguinal hernia              He has a hard nodule on his left proximal penis, this seems to be the source of his pain. He also has a history of hematuria. Will send to urology for further evaluation prior to consideration for hernia repair.     CT confirms hernia, performed in 2018 He would like to go forward with hernia repair. He will need clearance and PAT testing and labs, he has a history of hypertension, obesity and sleep apnea.        Electronically signed by Kenney Junior DO on 1/24/2021 at 12:13 PM

## 2021-01-25 ENCOUNTER — OFFICE VISIT (OUTPATIENT)
Dept: FAMILY MEDICINE CLINIC | Age: 66
End: 2021-01-25
Payer: COMMERCIAL

## 2021-01-25 VITALS
WEIGHT: 237 LBS | BODY MASS INDEX: 39.49 KG/M2 | SYSTOLIC BLOOD PRESSURE: 138 MMHG | TEMPERATURE: 98.1 F | DIASTOLIC BLOOD PRESSURE: 86 MMHG | OXYGEN SATURATION: 97 % | HEIGHT: 65 IN | HEART RATE: 65 BPM

## 2021-01-25 DIAGNOSIS — J44.9 CHRONIC OBSTRUCTIVE PULMONARY DISEASE, UNSPECIFIED COPD TYPE (HCC): ICD-10-CM

## 2021-01-25 DIAGNOSIS — R73.9 HYPERGLYCEMIA: ICD-10-CM

## 2021-01-25 DIAGNOSIS — J45.20 MILD INTERMITTENT ASTHMA WITHOUT COMPLICATION: ICD-10-CM

## 2021-01-25 DIAGNOSIS — I51.89 DIASTOLIC DYSFUNCTION: ICD-10-CM

## 2021-01-25 DIAGNOSIS — J30.2 CHRONIC SEASONAL ALLERGIC RHINITIS: ICD-10-CM

## 2021-01-25 DIAGNOSIS — M25.562 ACUTE PAIN OF LEFT KNEE: ICD-10-CM

## 2021-01-25 DIAGNOSIS — Z01.818 PREOPERATIVE CLEARANCE: ICD-10-CM

## 2021-01-25 DIAGNOSIS — I10 ESSENTIAL HYPERTENSION: Primary | ICD-10-CM

## 2021-01-25 PROCEDURE — 99214 OFFICE O/P EST MOD 30 MIN: CPT | Performed by: NURSE PRACTITIONER

## 2021-01-25 RX ORDER — AMLODIPINE BESYLATE 10 MG/1
10 TABLET ORAL DAILY
Qty: 90 TABLET | Refills: 1 | Status: SHIPPED | OUTPATIENT
Start: 2021-01-25 | End: 2021-09-27

## 2021-01-25 RX ORDER — FLUTICASONE PROPIONATE 50 MCG
1-2 SPRAY, SUSPENSION (ML) NASAL DAILY PRN
Qty: 3 BOTTLE | Refills: 1 | Status: SHIPPED | OUTPATIENT
Start: 2021-01-25 | End: 2022-05-17 | Stop reason: SDUPTHER

## 2021-01-25 RX ORDER — ASPIRIN 81 MG/1
TABLET ORAL
Qty: 90 TABLET | Refills: 1 | Status: SHIPPED | OUTPATIENT
Start: 2021-01-25 | End: 2021-08-16 | Stop reason: SDUPTHER

## 2021-01-25 RX ORDER — ALBUTEROL SULFATE 90 UG/1
2 AEROSOL, METERED RESPIRATORY (INHALATION) EVERY 4 HOURS PRN
Qty: 3 INHALER | Refills: 1 | Status: SHIPPED | OUTPATIENT
Start: 2021-01-25 | End: 2022-05-17 | Stop reason: SDUPTHER

## 2021-01-25 RX ORDER — BUDESONIDE AND FORMOTEROL FUMARATE DIHYDRATE 80; 4.5 UG/1; UG/1
2 AEROSOL RESPIRATORY (INHALATION) 2 TIMES DAILY
Qty: 3 INHALER | Refills: 1 | Status: SHIPPED | OUTPATIENT
Start: 2021-01-25 | End: 2022-05-17 | Stop reason: SDUPTHER

## 2021-01-25 RX ORDER — CETIRIZINE HYDROCHLORIDE 10 MG/1
10 TABLET ORAL NIGHTLY PRN
Qty: 90 TABLET | Refills: 1 | Status: SHIPPED | OUTPATIENT
Start: 2021-01-25 | End: 2022-05-17 | Stop reason: SDUPTHER

## 2021-01-25 RX ORDER — ALBUTEROL SULFATE 2.5 MG/3ML
2.5 SOLUTION RESPIRATORY (INHALATION) EVERY 6 HOURS PRN
Qty: 120 EACH | Refills: 1 | Status: SHIPPED | OUTPATIENT
Start: 2021-01-25 | End: 2022-03-04 | Stop reason: SDUPTHER

## 2021-01-25 ASSESSMENT — ENCOUNTER SYMPTOMS
CHEST TIGHTNESS: 0
VOMITING: 0
CONSTIPATION: 0
RHINORRHEA: 0
ABDOMINAL PAIN: 0
BACK PAIN: 0
NAUSEA: 0
DIARRHEA: 0
SORE THROAT: 0
SHORTNESS OF BREATH: 1
ABDOMINAL DISTENTION: 0
COUGH: 1

## 2021-01-25 ASSESSMENT — PATIENT HEALTH QUESTIONNAIRE - PHQ9
1. LITTLE INTEREST OR PLEASURE IN DOING THINGS: 0
SUM OF ALL RESPONSES TO PHQ QUESTIONS 1-9: 0

## 2021-01-25 NOTE — PROGRESS NOTES
Visit Information    Have you changed or started any medications since your last visit including any over-the-counter medicines, vitamins, or herbal medicines? no   Are you having any side effects from any of your medications? -  no  Have you stopped taking any of your medications? Is so, why? -  no    Have you seen any other physician or provider since your last visit? Yes - Records Obtained  Have you had any other diagnostic tests since your last visit? Yes - Records Obtained  Have you been seen in the emergency room and/or had an admission to a hospital since we last saw you? Yes - Records Obtained  Have you had your routine dental cleaning in the past 6 months? no    Have you activated your [employer] account? If not, what are your barriers?  Yes     Patient Care Team:  ADONAY Chong NP as PCP - General (Nurse Practitioner)  ADONAY Chong NP as PCP - [facility] Provider  Viktoria Jaime MD as Consulting Physician (Pulmonology)  Emperatriz Mendoza MD as Consulting Physician (Gastroenterology)    Medical History Review  Past Medical, Family, and Social History reviewed and does not contribute to the patient presenting condition    Health Maintenance   Topic Date Due    Annual Wellness Visit (AWV)  10/02/2020    DTaP/Tdap/Td vaccine (1 - Tdap) 10/19/2021 (Originally 1/31/1974)    Shingles Vaccine (2 of 2) 01/25/2022 (Originally 9/7/2020)    Potassium monitoring  08/11/2021    Creatinine monitoring  08/11/2021    Lipid screen  03/31/2022    Pneumococcal 65+ years Vaccine (2 of 2 - PPSV23) 10/02/2022    Colon cancer screen colonoscopy  07/10/2025    Flu vaccine  Completed    Hepatitis C screen  Completed    HIV screen  Completed    Hepatitis A vaccine  Aged Out    Hepatitis B vaccine  Aged Out    Hib vaccine  Aged Out    Meningococcal (ACWY) vaccine  Aged Out

## 2021-01-25 NOTE — PATIENT INSTRUCTIONS
[address] [address] Specialists- Frances Donaldson MD  [address] [facility], [address] [place] [address]  [facility] [address] [facility]. [address]  [place], [place], [address] [phone]

## 2021-01-25 NOTE — PROGRESS NOTES
Massimo Chavarria, APRN-CNP  [address] [facility]  [address] [address]  [address] [address]  Dept: [phone]  Dept Fax: [phone]     Patient ID: Niles Pulliam is a 72 y.o. male. HPI    Established pt here today for f/u on chronic medical problems; HTN, COPD, asthma, allergies, hyperglycemia, go over labs and/or diagnostic studies, and medication refills. Pt denies any fever or chills. Pt today denies any HA, chest pain, or SOB. Pt denies any N/V/D/C or abdominal pain. He is also in need of preoperative clearance for a left inguinal hernia repair. Dr. Bernadette Rolle will be performing the surgery. Today, he also complains of left knee pain. He relates that he did have a total knee replacement back in 2017 with Dr. Dawn Hare. Otherwise patient is doing well on current tx and voices no other concerns today. The patient's past medical, surgical, social, and family history as well as current medications and allergies were reviewed as documented in today's encounter by ENMA Suarez. My previous office notes, labs and diagnostic studies were reviewed prior to and during encounter. No current outpatient medications on file prior to visit. No current facility-administered medications on file prior to visit. Subjective:     Review of Systems   Constitutional: Negative for activity change, fatigue and fever. HENT: Negative for congestion, ear pain, rhinorrhea and sore throat. Respiratory: Positive for cough (occasional) and shortness of breath (occasional). Negative for chest tightness. Cardiovascular: Negative for chest pain (denies) and palpitations. Gastrointestinal: Negative for abdominal distention, abdominal pain, constipation, diarrhea, nausea and vomiting. Endocrine: Negative for polydipsia, polyphagia and polyuria. Genitourinary: Negative for difficulty urinating and dysuria. Musculoskeletal: Positive for arthralgias (stable), gait problem (due to left knee pain) and myalgias (stable). Negative for back pain. Skin: Negative for rash. Allergic/Immunologic: Positive for environmental allergies (stable). Neurological: Negative for dizziness, weakness, light-headedness and headaches. Hematological: Negative for adenopathy. Psychiatric/Behavioral: Negative for agitation and behavioral problems. The patient is not nervous/anxious. Objective:     Physical Exam  Vitals signs reviewed. Constitutional:       General: He is not in acute distress. Appearance: Normal appearance. He is well-developed. He is obese. HENT:      Head: Normocephalic and atraumatic. Right Ear: Hearing and external ear normal.      Left Ear: Hearing and external ear normal.      Nose: Nose normal. No congestion. Right Sinus: No maxillary sinus tenderness or frontal sinus tenderness. Left Sinus: No maxillary sinus tenderness or frontal sinus tenderness. Mouth/Throat:      Lips: Pink. No lesions. Mouth: Mucous membranes are moist. No oral lesions. Tongue: No lesions. Pharynx: Oropharynx is clear. No oropharyngeal exudate or posterior oropharyngeal erythema. Eyes:      Extraocular Movements: Extraocular movements intact. Conjunctiva/sclera: Conjunctivae normal.      Pupils: Pupils are equal, round, and reactive to light. Neck:      Musculoskeletal: Full passive range of motion without pain and normal range of motion. Thyroid: No thyroid mass. Cardiovascular:      Rate and Rhythm: Normal rate and regular rhythm. Pulses: Normal pulses. Heart sounds: Normal heart sounds. No murmur. Pulmonary:      Effort: Pulmonary effort is normal. No respiratory distress. Breath sounds: Normal breath sounds. No decreased air movement (good air exchange). No wheezing, rhonchi or rales.    Abdominal: General: Bowel sounds are normal. There is no distension. Palpations: Abdomen is soft. Tenderness: There is no abdominal tenderness. Musculoskeletal: Normal range of motion. Right lower leg: No edema. Left lower leg: No edema. Lymphadenopathy:      Cervical: No cervical adenopathy. Skin:     General: Skin is warm and dry. Capillary Refill: Capillary refill takes less than 2 seconds. Findings: No rash. Neurological:      General: No focal deficit present. Mental Status: He is alert and oriented to person, place, and time. Deep Tendon Reflexes: Reflexes normal.   Psychiatric:         Attention and Perception: Attention and perception normal.         Mood and Affect: Mood and affect normal.         Speech: Speech normal.         Behavior: Behavior normal. Behavior is cooperative. Cognition and Memory: Memory normal.         Assessment:      Diagnosis Orders   1. Essential hypertension  aspirin (ASPIRIN LOW DOSE) 81 MG EC tablet    amLODIPine (NORVASC) 10 MG tablet    Moshe Bui DO, Cardiology, [place]    CBC    Comprehensive Metabolic Panel    Lipid Panel   2. Chronic obstructive pulmonary disease, unspecified COPD type (HCC)  fluticasone (FLONASE) 50 MCG/ACT nasal spray    albuterol sulfate HFA (PROAIR HFA) 108 (90 Base) MCG/ACT inhaler    DEVORA Ken MD, Pulmonology, [place]   3. Hyperglycemia  Hemoglobin A1C   4. Mild intermittent asthma without complication  budesonide-formoterol (SYMBICORT) 80-4.5 MCG/ACT AERO    DEVORA Ken MD, Pulmonology, [place]   5. Diastolic dysfunction  Moshe Bui DO, Cardiology, [facility] Complete 2D W Doppler W Color   6. Chronic seasonal allergic rhinitis  fluticasone (FLONASE) 50 MCG/ACT nasal spray    cetirizine (ZYRTEC) 10 MG tablet   7. Acute pain of left knee  External Referral To Orthopedic Surgery   8.  Preoperative clearance  DEVORA Ken MD, Pulmonology, [place] [facility] - Moshe Morillo DO, Cardiology, [place]    XR CHEST (2 VW)    EKG 12 lead    ECHO Complete 2D W Doppler W Color     Plan:     1. Essential hypertension  - Stable: Medication re-filled as needed, con't medications as prescribed, con't current tx plan  - Continue Amlodipine as previously prescribed. - Will refer to Dr. Ron Robertson for cardiac clearance for left inguinal hernia repair  - Low sodium diet and routine exercise encouraged. - Advised to seek emergent tx if SBP>180 and/or DBP>110, any chest pain, h/a or vision changes. - aspirin (ASPIRIN LOW DOSE) 81 MG EC tablet; take 1 tablet by mouth once daily  Dispense: 90 tablet; Refill: 1  - amLODIPine (NORVASC) 10 MG tablet; Take 1 tablet by mouth daily  Dispense: 90 tablet; Refill: 1  - DEVORA - Moshe Morillo DO, Cardiology, [place]  - CBC; Future  - Comprehensive Metabolic Panel; Future  - Lipid Panel; Future    2. Chronic obstructive pulmonary disease, unspecified COPD type ([facility]  3. Mild intermittent asthma without complication  - Stable: Medication re-filled as needed, con't medications as prescribed, con't current tx plan   - Continue with Symbicort as previously prescribed. - Continue with Albuterol as previously prescribed. - Will refer to Dr. Zoë Nelson for pulmonary clearance given COPD and asthma history    - fluticasone (FLONASE) 50 MCG/ACT nasal spray; 1-2 sprays by Nasal route daily as needed for Rhinitis or Allergies  Dispense: 3 Bottle; Refill: 1  - albuterol sulfate HFA (PROAIR HFA) 108 (90 Base) MCG/ACT inhaler; Inhale 2 puffs into the lungs every 4 hours as needed for Wheezing or Shortness of Breath  Dispense: 3 Inhaler; Refill: 1  - budesonide-formoterol (SYMBICORT) 80-4.5 MCG/ACT AERO; Inhale 2 puffs into the lungs 2 times daily  Dispense: 3 Inhaler; Refill: 1  - DEVORA - Urmila Valencia MD, Pulmonology, [place]    4.  Hyperglycemia  - Advised to decrease, fats, carbohydrates, and engage in a healthier diet overall

## 2021-02-11 ENCOUNTER — HOSPITAL ENCOUNTER (OUTPATIENT)
Age: 66
Discharge: HOME | End: 2021-02-11
Payer: COMMERCIAL

## 2021-02-11 ENCOUNTER — HOSPITAL ENCOUNTER (OUTPATIENT)
Dept: NON INVASIVE DIAGNOSTICS | Age: 66
Discharge: HOME | End: 2021-02-11
Payer: COMMERCIAL

## 2021-02-11 DIAGNOSIS — Z01.818 PREOPERATIVE CLEARANCE: ICD-10-CM

## 2021-02-11 DIAGNOSIS — I51.89 DIASTOLIC DYSFUNCTION: ICD-10-CM

## 2021-02-11 LAB
LV EF: 55 %
LVEF MODALITY: NORMAL

## 2021-02-11 PROCEDURE — 93005 ELECTROCARDIOGRAM TRACING: CPT

## 2021-02-13 LAB
EKG ATRIAL RATE: 58 BPM
EKG P AXIS: 10 DEGREES
EKG P-R INTERVAL: 188 MS
EKG Q-T INTERVAL: 416 MS
EKG QRS DURATION: 94 MS
EKG QTC CALCULATION (BAZETT): 408 MS
EKG R AXIS: -29 DEGREES
EKG T AXIS: -21 DEGREES
EKG VENTRICULAR RATE: 58 BPM

## 2021-02-13 PROCEDURE — 93010 ELECTROCARDIOGRAM REPORT: CPT | Performed by: INTERNAL MEDICINE

## 2021-02-17 ENCOUNTER — TELEPHONE (OUTPATIENT)
Dept: FAMILY MEDICINE CLINIC | Age: 66
End: 2021-02-17

## 2021-02-17 DIAGNOSIS — Z01.818 PREOPERATIVE CLEARANCE: ICD-10-CM

## 2021-02-17 DIAGNOSIS — J44.9 CHRONIC OBSTRUCTIVE PULMONARY DISEASE, UNSPECIFIED COPD TYPE (HCC): Primary | ICD-10-CM

## 2021-02-17 NOTE — TELEPHONE ENCOUNTER
's office just wanted to reach out to let you know the patient no showed to her NP appt this morning. Thank you.

## 2021-02-17 NOTE — TELEPHONE ENCOUNTER
Pt called requesting new lung doctor referral for hernia surgery clearance. States he accidentally missed appt with Dr. Sandhya Lecuhga and was told he would need to be seen elsewhere. Requested cb with update.

## 2021-02-17 NOTE — TELEPHONE ENCOUNTER
Can we call the him and let him know he missed his appointment. Also, let him know that I need him to clear him lung wise for his surgery.   He can not have surgery until he sees the lung specialist

## 2021-02-17 NOTE — TELEPHONE ENCOUNTER
Spoke with patient and spoke with him. He said he didn't realize he missed his appt.   He is going to call dr jessica's office back to reschedule

## 2021-03-15 ENCOUNTER — TELEPHONE (OUTPATIENT)
Dept: FAMILY MEDICINE CLINIC | Age: 66
End: 2021-03-15

## 2021-03-15 DIAGNOSIS — J44.1 CHRONIC OBSTRUCTIVE PULMONARY DISEASE WITH ACUTE EXACERBATION (HCC): Primary | ICD-10-CM

## 2021-03-15 DIAGNOSIS — J45.20 MILD INTERMITTENT ASTHMA WITHOUT COMPLICATION: ICD-10-CM

## 2021-03-22 ENCOUNTER — VIRTUAL VISIT (OUTPATIENT)
Dept: FAMILY MEDICINE CLINIC | Age: 66
End: 2021-03-22
Payer: COMMERCIAL

## 2021-03-22 ENCOUNTER — TELEPHONE (OUTPATIENT)
Dept: FAMILY MEDICINE CLINIC | Age: 66
End: 2021-03-22

## 2021-03-22 DIAGNOSIS — J40 BRONCHITIS: Primary | ICD-10-CM

## 2021-03-22 PROCEDURE — 99213 OFFICE O/P EST LOW 20 MIN: CPT | Performed by: NURSE PRACTITIONER

## 2021-03-22 RX ORDER — AZITHROMYCIN 250 MG/1
250 TABLET, FILM COATED ORAL SEE ADMIN INSTRUCTIONS
Qty: 6 TABLET | Refills: 0 | Status: SHIPPED | OUTPATIENT
Start: 2021-03-22 | End: 2021-03-27

## 2021-03-22 NOTE — PATIENT INSTRUCTIONS
[address] [facility] Specialists- [facility], [address] [place] [address]   [phone]

## 2021-03-22 NOTE — PROGRESS NOTES
[place], ADONAY-[facility]  [address] [facility]  [address]  [place] [address]  [address] [address]  Dept: [phone]  Dept Fax: [phone]    Patient ID: Jann Nunez is a 77 y.o. male. HPI     Subjective:     Jann Nunez is a 77 y.o. male who presents today via virtual visit complaining of congestion and productive cough for 4 days. He denies a history of chest pain, chills, fevers and shortness of breath. He does a history of asthma but denies any shortness of breath. Patient does not smoke cigarettes. Pt denies any fever or chills. Pt today denies any HA, chest pain, or SOB. Pt denies any N/V/D/C or abdominal pain. He did receive the [place] vaccine approx. 2 weeks ago. Otherwise pt doing well on current tx and voices no other concerns today. The patient's past medical, surgical, social, and family history as well as his current medications and allergies were reviewed as documented in today's encounter by ENMA Gan. My previous office notes, labs and diagnostic studies were reviewed prior to and during encounter. Objective:     Physical Exam  Vitals reviewed: Vital signs unavailable, as this is a virtual visit. Constitutional:       General: He is not in acute distress. Appearance: Normal appearance. He is not ill-appearing or toxic-appearing. Pulmonary:      Effort: No tachypnea or accessory muscle usage. Comments: Patient able to talk in full sentences without difficulty   Neurological:      General: No focal deficit present. Mental Status: He is alert and oriented to person, place, and time. Psychiatric:         Mood and Affect: Mood normal.         Speech: Speech normal.         Behavior: Behavior normal. Behavior is cooperative. Assessment:      Diagnosis Orders   1. Bronchitis  azithromycin (ZITHROMAX) 250 MG tablet      Plan:     1.  Bronchitis  - Given above noted symptoms for the last 4  and failed OTC treatments, will call in ATB  - Symptomatic therapy suggested: push fluids, rest, use acetaminophen, ibuprofen, OTC Mucinex DM and will call in ATB. - Call if no improvement in 24-48 hours. or return to clinic prn if these symptoms worsen or fail to improve as anticipated. - azithromycin (ZITHROMAX) 250 MG tablet; Take 1 tablet by mouth See Admin Instructions for 5 days 500mg on day 1 followed by 250mg on days 2 - 5  Dispense: 6 tablet; Refill: 0    - Rest of systems unchanged, continue current treatments. - On this date March 22, 2021,  I have spent greater than 50% of this visit reviewing previous notes, test results and face to face with the patient discussing the diagnoses, importance of compliance with the treatment plan, counseling, coordinating care as well as documenting on the day of the visit. Keerthi Ross is a 77 y.o. male being evaluated by a Virtual Visit (video visit) encounter to address concerns as mentioned above. A caregiver was present when appropriate. Due to this being a TeleHealth encounter (During [facility]- public health emergency), evaluation of the following organ systems was limited: Vitals/Constitutional/EENT/Resp/CV/GI//MS/Neuro/Skin/Heme-Lymph-Imm. Pursuant to the emergency declaration under the [address] [place] [address], [address] [place] [address] authority and the [employer] General Act, this Virtual Visit was conducted with patient's (and/or legal guardian's) consent, to reduce the patient's risk of exposure to COVID-19 and provide necessary medical care. The patient (and/or legal guardian) has also been advised to contact this office for worsening conditions or problems, and seek emergency medical treatment and/or call 911 if deemed necessary.      Patient identification was verified at the start of the visit: Yes    Total time spent for this encounter: Not billed by time    Services

## 2021-04-06 ENCOUNTER — OFFICE VISIT (OUTPATIENT)
Dept: FAMILY MEDICINE CLINIC | Age: 66
End: 2021-04-06
Payer: COMMERCIAL

## 2021-04-06 VITALS
DIASTOLIC BLOOD PRESSURE: 82 MMHG | HEIGHT: 65 IN | OXYGEN SATURATION: 96 % | WEIGHT: 236 LBS | TEMPERATURE: 99.1 F | SYSTOLIC BLOOD PRESSURE: 130 MMHG | HEART RATE: 60 BPM | BODY MASS INDEX: 39.32 KG/M2

## 2021-04-06 DIAGNOSIS — Z00.00 PREVENTATIVE HEALTH CARE: ICD-10-CM

## 2021-04-06 DIAGNOSIS — Z00.00 ROUTINE GENERAL MEDICAL EXAMINATION AT A HEALTH CARE FACILITY: Primary | ICD-10-CM

## 2021-04-06 PROCEDURE — G0438 PPPS, INITIAL VISIT: HCPCS | Performed by: NURSE PRACTITIONER

## 2021-04-06 ASSESSMENT — PATIENT HEALTH QUESTIONNAIRE - PHQ9
SUM OF ALL RESPONSES TO PHQ9 QUESTIONS 1 & 2: 2
SUM OF ALL RESPONSES TO PHQ QUESTIONS 1-9: 2

## 2021-04-06 ASSESSMENT — LIFESTYLE VARIABLES: HOW OFTEN DO YOU HAVE A DRINK CONTAINING ALCOHOL: 0

## 2021-04-06 NOTE — PATIENT INSTRUCTIONS
Personalized Preventive Plan for Gerald Rojas - 4/6/2021  Medicare offers a range of preventive health benefits. Some of the tests and screenings are paid in full while other may be subject to a deductible, co-insurance, and/or copay. Some of these benefits include a comprehensive review of your medical history including lifestyle, illnesses that may run in your family, and various assessments and screenings as appropriate. After reviewing your medical record and screening and assessments performed today your provider may have ordered immunizations, labs, imaging, and/or referrals for you. A list of these orders (if applicable) as well as your Preventive Care list are included within your After Visit Summary for your review. Other Preventive Recommendations:    · A preventive eye exam performed by an eye specialist is recommended every 1-2 years to screen for glaucoma; cataracts, macular degeneration, and other eye disorders. · A preventive dental visit is recommended every 6 months. · Try to get at least 150 minutes of exercise per week or 10,000 steps per day on a pedometer . · Order or download the FREE \"Exercise & Physical Activity: Your Everyday Guide\" from [employer]. Call 7-445.355.1481 or search [employer] online. · You need 0685-0101 mg of calcium and 9998-0399 IU of vitamin D per day. It is possible to meet your calcium requirement with diet alone, but a vitamin D supplement is usually necessary to meet this goal.  · When exposed to the sun, use a sunscreen that protects against both UVA and UVB radiation with an SPF of 30 or greater. Reapply every 2 to 3 hours or after sweating, drying off with a towel, or swimming. · Always wear a seat belt when traveling in a car. Always wear a helmet when riding a bicycle or motorcycle. Heart-Healthy Diet   Sodium, Fat, and Cholesterol Controlled Diet       What Is a Heart Healthy Diet?    A heart-healthy diet is one that limits sodium , certain types of fat , and cholesterol . This type of diet is recommended for:   People with any form of cardiovascular disease (eg, coronary heart disease , peripheral vascular disease , previous heart attack , previous stroke )   People with risk factors for cardiovascular disease, such as high blood pressure , high cholesterol , or diabetes   Anyone who wants to lower their risk of developing cardiovascular disease   Sodium    Sodium is a mineral found in many foods. In general, most people consume much more sodium than they need. Diets high in sodium can increase blood pressure and lead to edema (water retention). On a heart-healthy diet, you should consume no more than 2,300 mg (milligrams) of sodium per dayabout the amount in one teaspoon of table salt. The foods highest in sodium include table salt (about 50% sodium), processed foods, convenience foods, and preserved foods. Cholesterol    Cholesterol is a fat-like, waxy substance in your blood. Our bodies make some cholesterol. It is also found in animal products, with the highest amounts in fatty meat, egg yolks, whole milk, cheese, shellfish, and organ meats. On a heart-healthy diet, you should limit your cholesterol intake to less than 200 mg per day. It is normal and important to have some cholesterol in your bloodstream. But too much cholesterol can cause plaque to build up within your arteries, which can eventually lead to a heart attack or stroke. The two types of cholesterol that are most commonly referred to are:   Low-density lipoprotein (LDL) cholesterol  Also known as bad cholesterol, this is the cholesterol that tends to build up along your arteries. Bad cholesterol levels are increased by eating fats that are saturated or hydrogenated. Optimal level of this cholesterol is less than 100. Over 130 starts to get risky for heart disease.    High-density lipoprotein (HDL) cholesterol  Also known as good example, this would mean 60 grams of fat or less per day. Saturated fat and trans fat in your diet raises your blood cholesterol the most, much more than dietary cholesterol does. For this reason, on a heart-healthy diet, less than 7% of your calories should come from saturated fat and ideally 0% from trans fat. On an 1800-calorie diet, this translates into less than 14 grams of saturated fat per day, leaving 46 grams of fat to come from mono- and polyunsaturated fats.    Food Choices on a Heart Healthy Diet   Food Category   Foods Recommended   Foods to Avoid   Grains   Breads and rolls without salted tops Most dry and cooked cereals Unsalted crackers and breadsticks Low-sodium or homemade breadcrumbs or stuffing All rice and pastas   Breads, rolls, and crackers with salted tops High-fat baked goods (eg, muffins, donuts, pastries) Quick breads, self-rising flour, and biscuit mixes Regular bread crumbs Instant hot cereals Commercially prepared rice, pasta, or stuffing mixes   Vegetables   Most fresh, frozen, and low-sodium canned vegetables Low-sodium and salt-free vegetable juices Canned vegetables if unsalted or rinsed   Regular canned vegetables and juices, including sauerkraut and pickled vegetables Frozen vegetables with sauces Commercially prepared potato and vegetable mixes   Fruits   Most fresh, frozen, and canned fruits All fruit juices   Fruits processed with salt or sodium   Milk   Nonfat or low-fat (1%) milk Nonfat or low-fat yogurt Cottage cheese, low-fat ricotta, cheeses labeled as low-fat and low-sodium   Whole milk Reduced-fat (2%) milk Malted and chocolate milk Full fat yogurt Most cheeses (unless low-fat and low salt) Buttermilk (no more than 1 cup per week)   Meats and Beans   Lean cuts of fresh or frozen beef, veal, lamb, or pork (look for the word loin) Fresh or frozen poultry without the skin Fresh or frozen fish and some shellfish Egg whites and egg substitutes (Limit whole eggs to three per and cholesterol amounts. For products low in sodium, look for sodium free, very low sodium, low sodium, no added salt, and unsalted   Skip the salt when cooking or at the table; if food needs more flavor, get creative and try out different herbs and spices. Garlic and onion also add substantial flavor to foods. Trim any visible fat off meat and poultry before cooking, and drain the fat off after wang. Use cooking methods that require little or no added fat, such as grilling, boiling, baking, poaching, broiling, roasting, steaming, stir-frying, and sauting. Avoid fast food and convenience food. They tend to be high in saturated and trans fat and have a lot of added salt. Talk to a registered dietitian for individualized diet advice. Last Reviewed: March 2011 David Fernandez MS, MPH, RD   Updated: 3/29/2011   ·     Preventing Osteoporosis: After Your Visit  Your Care Instructions  Osteoporosis means the bones are weak and thin enough that they can break easily. The older you are, the more likely you are to get osteoporosis. But with plenty of calcium, vitamin D, and exercise, you can help prevent osteoporosis. The preteen and teen years are a key time for bone building. With the help of calcium, vitamin D, and exercise in those early years and beyond, the bones reach their peak density and strength by age 27. After age 27, your bones naturally start to thin and weaken. The stronger your bones are at around age 27, the lower your risk for osteoporosis. But no matter what your age and risk are, your bones still need calcium, vitamin D, and exercise to stay strong. Also avoid smoking, and limit alcohol. Smoking and heavy alcohol use can make your bones thinner. Talk to your doctor about any special risks you might have, such as having a close relative with osteoporosis or taking a medicine that can weaken bones. Your doctor can tell you the best ways to protect your bones from thinning.   Follow-up care is a key part of your treatment and safety. Be sure to make and go to all appointments, and call your doctor if you are having problems. It's also a good idea to know your test results and keep a list of the medicines you take. How can you care for yourself at home? Get enough calcium and vitamin D. The [place] [employer] recommends adults younger than age 46 need 1,000 mg of calcium and 600 IU of vitamin D each day. Women ages 46 to 79 need 1,200 mg of calcium and 600 IU of vitamin D each day. Men ages 46 to 79 need 1,000 mg of calcium and 600 IU of vitamin D each day. Adults 71 and older need 1,200 mg of calcium and 800 IU of vitamin D each day. Eat foods rich in calcium, like yogurt, cheese, milk, and dark green vegetables. Eat foods rich in vitamin D, like eggs, fatty fish, cereal, and fortified milk. Get some sunshine. Your body uses sunshine to make its own vitamin D. The safest time to be out in the sun is before 10 a.m. or after 3 p.m. Avoid getting sunburned. Sunburn can increase your risk of skin cancer. Talk to your doctor about taking a calcium plus vitamin D supplement. Ask about what type of calcium is right for you, and how much to take at a time. Adults ages 23 to 48 should not get more than 2,500 mg of calcium and 4,000 IU of vitamin D each day, whether it is from supplements and/or food. Adults ages 46 and older should not get more than 2,000 mg of calcium and 4,000 IU of vitamin D each day from supplements and/or food. Get regular bone-building exercise. Weight-bearing and resistance exercises keep bones healthy by working the muscles and bones against gravity. Start out at an exercise level that feels right for you. Add a little at a time until you can do the following:  Do 30 minutes of weight-bearing exercise on most days of the week. Walking, jogging, stair climbing, and dancing are good choices. Do resistance exercises with weights or elastic bands 2 to 3 days a week.   Limit alcohol. Drink no more than 1 alcohol drink a day if you are a woman. Drink no more than 2 alcohol drinks a day if you are a man. Do not smoke. Smoking can make bones thin faster. If you need help quitting, talk to your doctor about stop-smoking programs and medicines. These can increase your chances of quitting for good. When should you call for help? Watch closely for changes in your health, and be sure to contact your doctor if:  You need help with a healthy eating plan. You need help with an exercise plan    © 5400-0008 [employer]. Care instructions adapted under license by [facility]. This care instruction is for use with your licensed healthcare professional. If you have questions about a medical condition or this instruction, always ask your healthcare professional. [address] any warranty or liability for your use of this information. Content Version: 9.4.59305; Last Revised: June 20, 2011              ·     Keep Your Memory Anola Cools       Many factors can affect your ability to remembera hectic lifestyle, aging, stress, chronic disease, and certain medicines. But, there are steps you can take to sharpen your mind and help preserve your memory. Challenge Your Brain   Regularly challenging your mind may help keeps it in top shape. Good mental exercises include:   Crossword puzzlesUse a dictionary if you need it; you will learn more that way. Brainteasers Try some! Crafts, such as wood working and sewing   Hobbies, such as gardening and building model airplanes   SocializingVisit old friends or join groups to meet new ones.    Reading   Learning a new language   Taking a class, whether it be art history or yesenia chi   TravelingExperience the food, history, and culture of your destination   Learning to use a computer   Going to museums, the theater, or thought-provoking movies   Changing things in your daily life, such as reversing your pattern in the grocery store or brushing your teeth using your nondominant hand   Use Memory Aids   There is no need to remember every detail on your own. These memory aids can help:   Calendars and day planners   Electronic organizers to store all sorts of helpful informationThese devices can \"beep\" to remind you of appointments. A book of days to record birthdays, anniversaries, and other occasions that occur on the same date every year   Detailed \"to-do\" lists and strategically placed sticky notes   Quick \"study\" sessionsBefore a gathering, review who will be there so their names will be fresh in your mind. Establish routinesFor example, keep your keys, wallet, and umbrella in the same place all the time or take medicine with your 8:00 AM glass of juice   Live a Healthy Life   Many actions that will keep your body strong will do the same for your mind. For example:   Talk to Your Doctor About Herbs and Supplements    Malnutrition and vitamin deficiencies can impair your mental function. For example, vitamin B12 deficiency can cause a range of symptoms, including confusion. But, what if your nutritional needs are being met? Can herbs and supplements still offer a benefit? Researchers have investigated a range of natural remedies, such as ginkgo , ginseng , and the supplement phosphatidylserine (PS). So far, though, the evidence is inconsistent as to whether these products can improve memory or thinking. If you are interested in taking herbs and supplements, talk to your doctor first because they may interact with other medicines that you are taking. Exercise Regularly    Among the many benefits of regular exercise are increased blood flow to the brain and decreased risk of certain diseases that can interfere with memory function. One study found that even moderate exercise has a beneficial effect.  Examples of \"moderate\" exercise include:   Playing 18 holes of golf once a week, without a cart   Playing tennis twice a week Walking one mile per day   Manage Stress    It can be tough to remember what is important when your mind is cluttered. Make time for relaxation. Choose activities that calm you down, and make it routine. Manage Chronic Conditions    Side effects of high blood pressure , diabetes, and heart disease can interfere with mental function. Many of the lifestyle steps discussed here can help manage these conditions. Strive to eat a healthy diet, exercise regularly, get stress under control, and follow your doctor's advice for your condition. Minimize Medications    Talk to your doctor about the medicines that you take. Some may be unnecessary. Also, healthy lifestyle habits may lower the need for certain drugs. Last Reviewed: April 2010 Sarah Simmons MD   Updated: 4/13/2010   ·     [address] [place] [address]       As we get older, changes in balance, gait, strength, vision, hearing, and cognition make even the most youthful senior more prone to accidents. Falls are one of the leading health risks for older people. This increased risk of falling is related to:   Aging process (eg, decreased muscle strength, slowed reflexes)   Higher incidence of chronic health problems (eg, arthritis, diabetes) that may limit mobility, agility or sensory awareness   Side effects of medicine (eg, dizziness, blurred vision)especially medicines like prescription pain medicines and drugs used to treat mental health conditions   Depending on the brittleness of your bones, the consequences of a fall can be serious and long lasting. Home Life   Research by the [employer] [facility]) shows that some home accidents among older adults can be prevented by making simple lifestyle changes and basic modifications and repairs to the home environment. Here are some lifestyle changes that experts recommend:   Have your hearing and vision checked regularly. Be sure to wear prescription glasses that are right for you.    Speak to your doctor or pharmacist about the possible side effects of your medicines. A number of medicines can cause dizziness. If you have problems with sleep, talk to your doctor. Limit your intake of alcohol. If necessary, use a cane or walker to help maintain your balance. Wear supportive, rubber-soled shoes, even at home. If you live in a region that gets wintry weather, you may want to put special cleats on your shoes to prevent you from slipping on the snow and ice. Exercise regularly to help maintain muscle tone, agility, and balance. Always hold the banister when going up or down stairs. Also, use  bars when getting in or out of the bath or shower, or using the toilet. To avoid dizziness, get up slowly from a lying down position. Sit up first, dangling your legs for a minute or two before rising to a standing position. Overall Home Safety Check   According to the Consumer [employer] Safety [employer]'s \"Older Consumer Home Safety Checklist,\" it is important to check for potential hazards in each room. And remember, proper lighting is an essential factor in home safety. If you cannot see clearly, you are more likely to fall. Important questions to ask yourself include:   Are lamp, electric, extension, and telephone cords placed out of the flow of traffic and maintained in good condition? Have frayed cords been replaced? Are all small rugs and runners slip resistant? If not, you can secure them to the floor with a special double-sided carpet tape. Are smoke detectors properly locatedone on every floor of your home and one outside of every sleeping area? Are they in good working order? Are batteries replaced at least once a year? Do you have a well-maintained carbon monoxide detector outside every sleeping are in your home? Does your furniture layout leave plenty of space to maneuver between and around chairs, tables, beds, and sofas? Are hallways, stairs and passages between rooms well lit?  Can you reach as grab bars.)    Tub seats fitted with non-slip material on the legs allow you to wash sitting down. For people with limited mobility, bathtub transfer benches allow you to slide safely into the tub. Raised toilet seats and toilet safety rails are helpful for those with knee or hip problems. In the [facility]    Make sure you use a nightlight and that the area around your bed is clear of potential obstacles. Be careful with electric blankets and never go to sleep with a heating pad, which can cause serious burns even if on a low setting. Use fire-resistant mattress covers and pillows, and NEVER smoke in bed. Keep a phone next to the bed that is programmed to dial 911 at the push of a button. If you have a chronic condition, you may want to sign on with an automatic call-in service. Typically the system includes a small pendant that connects directly to an emergency medical voice-response system. You should also make arrangements to stay in contact with someonefriend, neighbor, family memberon a regular schedule. Fire Prevention   According to the [employer]. (Smoke Alarms for Every) [address] [place] [address], senior citizens are one of the two highest risk groups for death and serious injuries due to residential fires. When cooking, wear short-sleeved items, never a bulky long-sleeved robe. The [facility]'s Safety Checklist for Older Consumers emphasizes the importance of checking basements, garages, workshops and storage areas for fire hazards, such as volatile liquids, piles of old rags or clothing and overloaded circuits. Never smoke in bed or when lying down on a couch or recliner chair. Small portable electric or kerosene heaters are responsible for many home fires and should be used cautiously if at all. If you do use one, be sure to keep them away from flammable materials. In case of fire, make sure you have a pre-established emergency exit plan.     Have a professional check your

## 2021-04-06 NOTE — PROGRESS NOTES
Medicare Annual Wellness Visit  Name: Lacie Pi Date: 2021   MRN: [id number] Sex: Male   Age: 77 y.o. Ethnicity: Non-/Non    : 1955 Race: Black      Vamshi Monroe is here for Medicare AWV    Screenings for behavioral, psychosocial and functional/safety risks, and cognitive dysfunction are all negative except as indicated below. These results, as well as other patient data from the [address] form, are documented in Flowsheets linked to this Encounter. Allergies   Allergen Reactions    Mobic [Meloxicam]      Patient was hospitalized after taking this medication. Affected liver/  Caused bleeding         Prior to Visit Medications    Medication Sig Taking?  Authorizing Provider   fluticasone (FLONASE) 50 MCG/ACT nasal spray 1-2 sprays by Nasal route daily as needed for Rhinitis or Allergies Yes Ami Quintero APRN - NP   cetirizine (ZYRTEC) 10 MG tablet Take 1 tablet by mouth nightly as needed for Allergies or Rhinitis Yes Ami Quintero APRN - NP   budesonide-formoterol (SYMBICORT) 80-4.5 MCG/ACT AERO Inhale 2 puffs into the lungs 2 times daily Yes Ami Quintero APRN - NP   aspirin (ASPIRIN LOW DOSE) 81 MG EC tablet take 1 tablet by mouth once daily Yes Ami Quintero APRN - NP   amLODIPine (NORVASC) 10 MG tablet Take 1 tablet by mouth daily Yes Ami Quintero APRN - NP   albuterol sulfate HFA (PROAIR HFA) 108 (90 Base) MCG/ACT inhaler Inhale 2 puffs into the lungs every 4 hours as needed for Wheezing or Shortness of Breath Yes Ami Quintero APRN - NP   albuterol (PROVENTIL) (2.5 MG/3ML) 0.083% nebulizer solution Take 3 mLs by nebulization every 6 hours as needed for Wheezing or Shortness of Breath Yes ADONAY Alfonso - DINORAH         Past Medical History:   Diagnosis Date    Acute hepatitis     secondary to Mobic medication effects pt states    [facility] [address]     Asthma     Epilepsia ([facility] [address]     last seizure known was when he was 32years old    Family history of brain cancer 10/2/2017    Hematuria 6/30/2015    History of total knee replacement 2/16/2017    Hypertension     Osteoarthritis     Thoracic degenerative disc disease 12/21/2017       Past Surgical History:   Procedure Laterality Date    COLONOSCOPY  07/10/2015    DR NEELIMA GALARZA - diverticula    INGUINAL HERNIA REPAIR Right     JOINT REPLACEMENT Left 01/03/2018    JOINT REPLACEMENT Left 01/03/2017    TOTAL KNEE ARTHROPLASTY  11/02/2016    right         Family History   Problem Relation Age of Onset    Heart Disease Father         chf    Cancer Sister         Brain Cancer       CareTeam (Including outside providers/suppliers regularly involved in providing care):   Patient Care Team:  ADONAY Mack NP as PCP - General (Nurse Practitioner)  ADONAY Mack NP as PCP - [address] [place] [address] [facility]  Nehal Leigh MD as Consulting Physician (Pulmonology)  Deborah Coronel MD as Consulting Physician (Gastroenterology)    Wt Readings from Last 3 Encounters:   04/06/21 236 lb (107 kg)   01/25/21 237 lb (107.5 kg)   01/15/21 232 lb (105.2 kg)     Vitals:    04/06/21 1105   BP: 130/82   Pulse: 60   Temp: 99.1 °F (37.3 °C)   SpO2: 96%   Weight: 236 lb (107 kg)   Height: 5' 5\" (1.651 m)     Body mass index is 39.27 kg/m². Based upon direct observation of the patient, evaluation of cognition reveals recent and remote memory intact.     General Appearance: alert and oriented to person, place and time, well-developed and well-nourished, in no acute distress and in no acute distress  Skin: warm and dry, no rash or erythema  Head: normocephalic and atraumatic  Pulmonary/Chest: clear to auscultation bilaterally- no wheezes, rales or rhonchi, normal air movement, no respiratory distress  Cardiovascular: normal rate, normal S1 and S2, no gallops, intact distal pulses and no carotid bruits  Musculoskeletal: normal range of motion, no joint swelling, deformity or tenderness  Neurologic: gait and coordination normal and speech normal    Patient's complete Health Risk Assessment and screening values have been reviewed and are found in Flowsheets. The following problems were reviewed today and where indicated follow up appointments were made and/or referrals ordered. Positive Risk Factor Screenings with Interventions:            General Health and ACP:  General  In general, how would you say your health is?: Good  In the past 7 days, have you experienced any of the following?  New or Increased Pain, New or Increased Fatigue, Loneliness, Social Isolation, Stress or Anger?: (!) Stress, Anger  Do you get the social and emotional support that you need?: Yes  Do you have a Living Will?: (!) No  Advance Directives     Power of  Living Will ACP-Advance Directive ACP-Power of     Not on File Not on File Not on File Not on File      General Health Risk Interventions:  · Social isolation: patient declines any further intervention for this issue  · Stress: relaxation techniques discussed, patient declines any further evaluation/treatment for this issue  · No Living Will: Patient declines ACP discussion/assistance    Health Habits/Nutrition:  Health Habits/Nutrition  Do you exercise for at least 20 minutes 2-3 times per week?: Yes  Have you lost any weight without trying in the past 3 months?: No  Do you eat only one meal per day?: (!) Yes  Have you seen the dentist within the past year?: (!) No  Body mass index: (!) 39.27  Health Habits/Nutrition Interventions:  · Dental exam overdue:  patient encouraged to make appointment with his/her dentist    Hearing/Vision:  No exam data present  Hearing/Vision  Do you or your family notice any trouble with your hearing that hasn't been managed with hearing aids?: No  Do you have difficulty driving, watching TV, or doing any of your daily activities because of your eyesight?: (!) Yes  Have you had an eye exam within the past year?: (!) No  Hearing/Vision Interventions:  · Hearing concerns:  patient declines any further evaluation/treatment for hearing issues  · Vision concerns:  patient declines any further evaluation/treatment for this issue      Personalized Preventive Plan   Current Health Maintenance Status  Immunization History   Administered Date(s) Administered    COVID-19, J&J, PF, 0.5 mL 03/10/2021    Influenza Virus Vaccine 09/09/2016, 10/02/2017    Influenza, Quadv, IM, PF (6 mo and older Fluzone, Flulaval, Fluarix, and 3 yrs and older Afluria) 09/09/2016, 10/02/2017, 10/16/2019    Influenza, Quadv, adjuvanted, 65 yrs +, IM, PF (Fluad) 10/19/2020    Pneumococcal Conjugate 13-valent ([id number]) 09/09/2016    Pneumococcal Polysaccharide ([id number]) 10/02/2017    Zoster Recombinant (Shingrix) 07/13/2020        Health Maintenance   Topic Date Due    Annual Wellness Visit (AWV)  Never done    DTaP/Tdap/Td vaccine (1 - Tdap) 10/19/2021 (Originally 1/31/1974)    Shingles Vaccine (2 of 2) 01/25/2022 (Originally 9/7/2020)    Potassium monitoring  08/11/2021    Creatinine monitoring  08/11/2021    Lipid screen  03/31/2022    Pneumococcal 65+ years Vaccine (2 of 2 - PPSV23) 10/02/2022    Colon cancer screen colonoscopy  07/10/2025    Flu vaccine  Completed    COVID-19 Vaccine  Completed    Hepatitis C screen  Completed    Hepatitis A vaccine  Aged Out    Hepatitis B vaccine  Aged Out    Hib vaccine  Aged Out    Meningococcal (ACWY) vaccine  Aged Out     Recommendations for [employer] Due: see orders and patient instructions/AVS.  . Recommended screening schedule for the next 5-10 years is provided to the patient in written form: see Patient Instructions/AVS.    Monica Rocha was seen today for medicare awv. Diagnoses and all orders for this visit:    1. Routine general medical examination at a health care facility  2.  Preventative health care  - Continue with annual medicare wellness visits  - Will cont with current treatment as pt is currently stable on current treatment  - Continue with all medications as previously prescribed. - Will cont to follow with all specialists as previously as instructed     - CBC; Future  - Comprehensive Metabolic Panel; Future  - Hemoglobin A1C; Future  - Lipid Panel;  Future    ADONAY Mcdermott-CNP

## 2021-08-16 ENCOUNTER — NURSE TRIAGE (OUTPATIENT)
Dept: OTHER | Facility: CLINIC | Age: 66
End: 2021-08-16

## 2021-08-16 ENCOUNTER — VIRTUAL VISIT (OUTPATIENT)
Dept: FAMILY MEDICINE CLINIC | Age: 66
End: 2021-08-16
Payer: COMMERCIAL

## 2021-08-16 DIAGNOSIS — J01.90 ACUTE BACTERIAL SINUSITIS: Primary | ICD-10-CM

## 2021-08-16 DIAGNOSIS — J44.1 CHRONIC OBSTRUCTIVE PULMONARY DISEASE WITH ACUTE EXACERBATION (HCC): ICD-10-CM

## 2021-08-16 DIAGNOSIS — B96.89 ACUTE BACTERIAL SINUSITIS: Primary | ICD-10-CM

## 2021-08-16 DIAGNOSIS — I10 ESSENTIAL HYPERTENSION: ICD-10-CM

## 2021-08-16 PROCEDURE — 99213 OFFICE O/P EST LOW 20 MIN: CPT | Performed by: NURSE PRACTITIONER

## 2021-08-16 RX ORDER — PREDNISONE 20 MG/1
TABLET ORAL
Qty: 17 TABLET | Refills: 0 | Status: SHIPPED | OUTPATIENT
Start: 2021-08-16 | End: 2021-08-26

## 2021-08-16 RX ORDER — AMOXICILLIN AND CLAVULANATE POTASSIUM 875; 125 MG/1; MG/1
1 TABLET, FILM COATED ORAL 2 TIMES DAILY
Qty: 20 TABLET | Refills: 0 | Status: SHIPPED | OUTPATIENT
Start: 2021-08-16 | End: 2021-08-26

## 2021-08-16 RX ORDER — ASPIRIN 81 MG/1
TABLET ORAL
Qty: 90 TABLET | Refills: 1 | Status: SHIPPED | OUTPATIENT
Start: 2021-08-16 | End: 2022-05-17 | Stop reason: SDUPTHER

## 2021-08-16 SDOH — ECONOMIC STABILITY: FOOD INSECURITY: WITHIN THE PAST 12 MONTHS, THE FOOD YOU BOUGHT JUST DIDN'T LAST AND YOU DIDN'T HAVE MONEY TO GET MORE.: NEVER TRUE

## 2021-08-16 SDOH — ECONOMIC STABILITY: FOOD INSECURITY: WITHIN THE PAST 12 MONTHS, YOU WORRIED THAT YOUR FOOD WOULD RUN OUT BEFORE YOU GOT MONEY TO BUY MORE.: NEVER TRUE

## 2021-08-16 ASSESSMENT — ENCOUNTER SYMPTOMS
NAUSEA: 0
ABDOMINAL DISTENTION: 0
SINUS PAIN: 1
SORE THROAT: 0
SINUS PRESSURE: 1
DIARRHEA: 0
ABDOMINAL PAIN: 0
CHEST TIGHTNESS: 0
COUGH: 1
RHINORRHEA: 0
CONSTIPATION: 0
WHEEZING: 1
SHORTNESS OF BREATH: 0
BACK PAIN: 0
VOMITING: 0

## 2021-08-16 ASSESSMENT — SOCIAL DETERMINANTS OF HEALTH (SDOH): HOW HARD IS IT FOR YOU TO PAY FOR THE VERY BASICS LIKE FOOD, HOUSING, MEDICAL CARE, AND HEATING?: NOT HARD AT ALL

## 2021-08-16 NOTE — TELEPHONE ENCOUNTER
Received call from Vonna Gosselin at [facility] with [employer]. Brief description of triage: URI symptoms x 2 days, states he gets it every year. Triage indicates for patient to be seen within 4 hours. Advised patient to go to walk-in clinic/UCC/ED if unable to get appointment. Care advice provided, patient verbalizes understanding; denies any other questions or concerns; instructed to call back for any new or worsening symptoms. Writer provided warm transfer to Kourtney Givens at [facility] for appointment scheduling. Attention Provider: Thank you for allowing me to participate in the care of your patient. The patient was connected to triage in response to information provided to the ECC. Please do not respond through this encounter as the response is not directed to a shared pool. Reason for Disposition   MILD difficulty breathing (e.g., minimal/no SOB at rest, SOB with walking, pulse < 100) of new onset or worse than normal    Answer Assessment - Initial Assessment Questions  1. RESPIRATORY STATUS: \"Describe your breathing? \" (e.g., wheezing, shortness of breath, unable to speak, severe coughing)       More short of breath when walking around trying to do things    2. ONSET: \"When did this breathing problem begin? \"       2 days    3. PATTERN \"Does the difficult breathing come and go, or has it been constant since it started? \"       Comes and goes    4. SEVERITY: \"How bad is your breathing? \" (e.g., mild, moderate, severe)     - MILD: No SOB at rest, mild SOB with walking, speaks normally in sentences, can lay down, no retractions, pulse < 100.     - MODERATE: SOB at rest, SOB with minimal exertion and prefers to sit, cannot lie down flat, speaks in phrases, mild retractions, audible wheezing, pulse 100-120.     - SEVERE: Very SOB at rest, speaks in single words, struggling to breathe, sitting hunched forward, retractions, pulse > 120       Mild    5.  RECURRENT SYMPTOM: \"Have you had difficulty breathing before? \" If so, ask: \"When was the last time? \" and \"What happened that time? \"       Yes every year    6. CARDIAC HISTORY: \"Do you have any history of heart disease? \" (e.g., heart attack, angina, bypass surgery, angioplasty)       Recent stress test    7. LUNG HISTORY: \"Do you have any history of lung disease? \"  (e.g., pulmonary embolus, asthma, emphysema)      Asthma    8. CAUSE: \"What do you think is causing the breathing problem? \"       URI    9. OTHER SYMPTOMS: \"Do you have any other symptoms? (e.g., dizziness, runny nose, cough, chest pain, fever)      Cough, head congestion    10. PREGNANCY: \"Is there any chance you are pregnant? \" \"When was your last menstrual period? \"        N/A    11. TRAVEL: \"Have you traveled out of the country in the last month? \" (e.g., travel history, exposures)        No    Protocols used: BREATHING DIFFICULTY-ADULT-[place]

## 2021-08-16 NOTE — PROGRESS NOTES
Tre Eid, APRN-CNP  [address] [facility]  [id number]  [place] [address]  [address] [address]  Dept: [phone]  Dept Fax: [phone]    PATIENT ID: Ludivina Mix is a 77 y.o. male. HPI:  Ludivina Mix is a 77 y.o. male is an established patient presenting today via virtual visit complaining of congestion, productive cough (with yellow sputum production, headache and frontal and maxillary sinus pain and pressure for 4 days. He denies a history of chest pain, chills, fevers and shortness of breath. He does have a history of asthma. He reports wheezing and using his nebulizer machine 3-4 times a day. Patient does not smoke cigarettes. Pt denies any fever or chills. Pt today denies any HA, chest pain, or SOB. Pt denies any N/V/D/C or abdominal pain. Otherwise pt doing well on current tx and voices no other concerns today. My previous office notes, labs and diagnostic studies were reviewed prior to and during encounter. The patient's past medical, surgical, social, and family history as well as his current medications and allergies were reviewed as documented in today's encounter by ENMA Root. SUBJECTIVE:     Review of Systems   Constitutional: Negative for activity change, fatigue and fever. HENT: Positive for congestion, sinus pressure and sinus pain. Negative for ear pain, rhinorrhea and sore throat. Respiratory: Positive for cough (with yellow sputum) and wheezing. Negative for chest tightness and shortness of breath. Cardiovascular: Negative for chest pain and palpitations. Gastrointestinal: Negative for abdominal distention, abdominal pain, constipation, diarrhea, nausea and vomiting. Endocrine: Negative for polydipsia, polyphagia and polyuria. Genitourinary: Negative for difficulty urinating and dysuria. Musculoskeletal: Negative for arthralgias, back pain and myalgias. Skin: Negative for rash.    Neurological: Positive for headaches. Negative for dizziness, weakness and light-headedness. Hematological: Negative for adenopathy. Psychiatric/Behavioral: Negative for agitation and behavioral problems. The patient is not nervous/anxious. OBJECTIVE: There were no vitals taken during this encounter, as this is a virtual visit. Physical Exam  Vitals reviewed: Vital signs unavailable, as this is a virtual visit. Constitutional:       General: He is not in acute distress. Appearance: Normal appearance. He is not ill-appearing or toxic-appearing. Pulmonary:      Effort: No tachypnea or accessory muscle usage. Comments: Patient able to talk in full sentences without difficulty   Neurological:      General: No focal deficit present. Mental Status: He is alert and oriented to person, place, and time. Psychiatric:         Mood and Affect: Mood normal.         Speech: Speech normal.         Behavior: Behavior normal. Behavior is cooperative. ASSESSMENT:   Diagnosis Orders   1. Acute bacterial sinusitis  amoxicillin-clavulanate (AUGMENTIN) 875-125 MG per tablet   2. Essential hypertension  aspirin (ASPIRIN LOW DOSE) 81 MG EC tablet   3. Chronic obstructive pulmonary disease with acute exacerbation (HCC)  predniSONE (DELTASONE) 20 MG tablet      PLAN:  1. Acute bacterial sinusitis  - Push fluids  - Tylenol/Motrin    - OTC Mucinex-D BID   - Start Flonase- advised to take 2 sprays to each nostril daily while s/s are persisting, then may decrease to PRN as ordered  - Will call in an ATB  - Symptomatic therapy suggested: push fluids, rest, use acetaminophen, ibuprofen prn and return office visit prn if symptoms persist or worsen. - Call or return to clinic prn if these symptoms worsen or fail to improve as anticipated. - amoxicillin-clavulanate (AUGMENTIN) 875-125 MG per tablet; Take 1 tablet by mouth 2 times daily for 10 days  Dispense: 20 tablet; Refill: 0    3.  Chronic obstructive pulmonary disease with acute exacerbation ([facility] [address]  - Given reports of wheezing and increased nebulizer use, will do prednisone taper  - predniSONE (DELTASONE) 20 MG tablet; Take 2 tabs daily x 4 days, then 1 tab daily x 6 days, then 1/2 tab daily x 6 days  Dispense: 17 tablet; Refill: 0     - Rest of systems unchanged, continue current treatments. - On this date August 16, 2021,  I have spent greater than 50% of this visit reviewing previous notes, test results and face to face with the patient discussing the diagnoses, importance of compliance with the treatment plan, counseling, coordinating care as well as documenting on the day of the visit. Elías Montilla, was evaluated through a synchronous (real-time) audio-video encounter. The patient (or guardian if applicable) is aware that this is a billable service. Verbal consent to proceed has been obtained within the past 12 months. The visit was conducted pursuant to the emergency declaration under the [address] [place] [address], [address] [place] [address] authority and the [employer] and [facility] General Act. Patient identification was verified, and a caregiver was present when appropriate. The patient was located in a state where the provider was credentialed to provide care. Total time spent for this encounter: Not billed by time    --ADONAY Cuellar NP on 8/16/2021 at 12:05 PM    An electronic signature was used to authenticate this note.     ADONAY Andrade-CNP

## 2021-09-27 DIAGNOSIS — I10 ESSENTIAL HYPERTENSION: ICD-10-CM

## 2021-09-27 RX ORDER — AMLODIPINE BESYLATE 10 MG/1
TABLET ORAL
Qty: 90 TABLET | Refills: 1 | Status: SHIPPED | OUTPATIENT
Start: 2021-09-27 | End: 2022-05-17 | Stop reason: SDUPTHER

## 2021-10-13 ENCOUNTER — NURSE TRIAGE (OUTPATIENT)
Dept: OTHER | Facility: CLINIC | Age: 66
End: 2021-10-13

## 2021-10-13 NOTE — TELEPHONE ENCOUNTER
Reason for Disposition   Patient wants to be seen    Answer Assessment - Initial Assessment Questions  1. MECHANISM: \"How did the injury happen? \" (e.g., twisting injury, direct blow)       I fell down the steps after accidentally pulling off dog's collar, he's afraid of steps and I was pulling on the collar. 2. ONSET: \"When did the injury happen? \" (Minutes or hours ago)       About a month      3. LOCATION: \"Where is the injury located? \"       Right lower leg    4. APPEARANCE of INJURY: \"What does the injury look like? \"  (e.g., deformity of leg)      Swelling to ankle and calf( Calf gets so tight it feels like it's going to bust.    5. SEVERITY: \"Can you put weight on that leg? \" \"Can you walk? \"       I can put weight on it and can walk on it    6. SIZE: For cuts, bruises, or swelling, ask: \"How large is it? \" (e.g., inches or centimeters)       Swelling of ankle    7. PAIN: \"Is there pain? \" If so, ask: \"How bad is the pain? \"  (Scale 1-10; or mild, moderate, severe)      Yes 6-7/10    8. TETANUS: For any breaks in the skin, ask: \"When was the last tetanus booster? \"      N/a    9. OTHER SYMPTOMS: \"Do you have any other symptoms? \"       Pain in arch of right foot,    10. PREGNANCY: \"Is there any chance you are pregnant? \" \"When was your last menstrual period? \"        No    Protocols used: LEG INJURY-ADULT-[place]    Received call from [facility] with [employer]. Brief description of triage: right lower leg injury with  pain and ankle swelling. Triage indicates for patient to be seen in office today. Walk in clinic recommended if no appts today. Care advice provided, patient verbalizes understanding; denies any other questions or concerns; instructed to call back for any new or worsening symptoms. Writer provided warm transfer to [facility] for appointment scheduling. Attention Provider: Thank you for allowing me to participate in the care of your patient.   The patient was connected to triage in response to information provided to the ECC/PSC. Please do not respond through this encounter as the response is not directed to a shared pool.

## 2021-10-14 ENCOUNTER — HOSPITAL ENCOUNTER (OUTPATIENT)
Dept: ULTRASOUND IMAGING | Age: 66
Discharge: HOME | End: 2021-10-16
Payer: MEDICAID

## 2021-10-14 ENCOUNTER — OFFICE VISIT (OUTPATIENT)
Dept: FAMILY MEDICINE CLINIC | Age: 66
End: 2021-10-14
Payer: MEDICAID

## 2021-10-14 VITALS
TEMPERATURE: 97 F | SYSTOLIC BLOOD PRESSURE: 120 MMHG | WEIGHT: 240 LBS | BODY MASS INDEX: 39.94 KG/M2 | DIASTOLIC BLOOD PRESSURE: 80 MMHG | HEART RATE: 65 BPM | OXYGEN SATURATION: 97 %

## 2021-10-14 DIAGNOSIS — I51.9 LEFT VENTRICULAR DIASTOLIC DYSFUNCTION: ICD-10-CM

## 2021-10-14 DIAGNOSIS — G47.33 OSA ON CPAP: ICD-10-CM

## 2021-10-14 DIAGNOSIS — Z23 NEED FOR VACCINATION: ICD-10-CM

## 2021-10-14 DIAGNOSIS — M79.89 PAIN AND SWELLING OF LEFT LOWER LEG: ICD-10-CM

## 2021-10-14 DIAGNOSIS — J44.9 CHRONIC OBSTRUCTIVE PULMONARY DISEASE, UNSPECIFIED COPD TYPE (HCC): ICD-10-CM

## 2021-10-14 DIAGNOSIS — M79.662 PAIN AND SWELLING OF LEFT LOWER LEG: ICD-10-CM

## 2021-10-14 DIAGNOSIS — I51.89 DIASTOLIC DYSFUNCTION: ICD-10-CM

## 2021-10-14 DIAGNOSIS — J30.2 SEASONAL ALLERGIC RHINITIS, UNSPECIFIED TRIGGER: ICD-10-CM

## 2021-10-14 DIAGNOSIS — Z99.89 OSA ON CPAP: ICD-10-CM

## 2021-10-14 DIAGNOSIS — I10 ESSENTIAL HYPERTENSION: Primary | ICD-10-CM

## 2021-10-14 DIAGNOSIS — J45.20 MILD INTERMITTENT ASTHMA WITHOUT COMPLICATION: ICD-10-CM

## 2021-10-14 DIAGNOSIS — R73.9 HYPERGLYCEMIA: ICD-10-CM

## 2021-10-14 PROBLEM — M17.12 PRIMARY OSTEOARTHRITIS OF LEFT KNEE: Status: ACTIVE | Noted: 2017-11-29

## 2021-10-14 PROCEDURE — 90694 VACC AIIV4 NO PRSRV 0.5ML IM: CPT | Performed by: NURSE PRACTITIONER

## 2021-10-14 PROCEDURE — 93971 EXTREMITY STUDY: CPT

## 2021-10-14 PROCEDURE — G0008 ADMIN INFLUENZA VIRUS VAC: HCPCS | Performed by: NURSE PRACTITIONER

## 2021-10-14 PROCEDURE — 99214 OFFICE O/P EST MOD 30 MIN: CPT | Performed by: NURSE PRACTITIONER

## 2021-10-14 ASSESSMENT — ENCOUNTER SYMPTOMS
NAUSEA: 0
SHORTNESS OF BREATH: 0
BACK PAIN: 0
ABDOMINAL PAIN: 0
APNEA: 1
COUGH: 0
CHEST TIGHTNESS: 0
CONSTIPATION: 0
ABDOMINAL DISTENTION: 0
RHINORRHEA: 0
DIARRHEA: 0
SORE THROAT: 0
VOMITING: 0

## 2021-10-14 NOTE — PROGRESS NOTES
Have you ever had a reaction to a flu vaccine? No  Are you able to eat eggs without adverse effects? Yes  Do you have any current illness? No  Have you ever had [place] Syndrome? No    Flu vaccine given per order. Please see immunization tab.

## 2021-10-14 NOTE — PROGRESS NOTES
Catalina Moore, APRN-CNP  [address] [facility]  [id number]  [address] [address]  Dept: [phone]  Dept Fax: [phone]     PATIENT ID: Kelly Reyna is a 77 y.o. male. HPI:  Established pt here today for f/u on chronic medical problems; HTN, COPD, asthma, allergies, hyperglycemia,, go over labs and/or diagnostic studies, and medication refills. Pt denies any fever or chills. Pt today denies any HA, chest pain, or SOB. Pt denies any N/V/D/C or abdominal pain. Pt states asthma is stable on inhalers. Pt states COPD is stable on inhalers. Pt stable on CPAP with his sleep apnea and has been using nightly. Pt states his allergies are stable on meds. Today, patient complains of right lower leg pain and swelling. He relates that about a month ago he was walking his dog and the dog's collar broke. He denies falling but braced himself with his right leg. My previous office notes, labs and diagnostic studies were reviewed prior to and during encounter. The patient's past medical, surgical, social, and family history as well as current medications and allergies were reviewed as documented in today's encounter by ENMA Nettles.        Current Outpatient Medications on File Prior to Visit   Medication Sig Dispense Refill    amLODIPine (NORVASC) 10 MG tablet take 1 tablet by mouth once daily 90 tablet 1    aspirin (ASPIRIN LOW DOSE) 81 MG EC tablet take 1 tablet by mouth once daily 90 tablet 1    fluticasone (FLONASE) 50 MCG/ACT nasal spray 1-2 sprays by Nasal route daily as needed for Rhinitis or Allergies 3 Bottle 1    cetirizine (ZYRTEC) 10 MG tablet Take 1 tablet by mouth nightly as needed for Allergies or Rhinitis 90 tablet 1    budesonide-formoterol (SYMBICORT) 80-4.5 MCG/ACT AERO Inhale 2 puffs into the lungs 2 times daily 3 Inhaler 1    albuterol sulfate HFA (PROAIR HFA) 108 (90 Base) MCG/ACT inhaler Inhale 2 puffs into the lungs every 4 hours as needed for Wheezing or Shortness of Breath 3 Inhaler 1    albuterol (PROVENTIL) (2.5 MG/3ML) 0.083% nebulizer solution Take 3 mLs by nebulization every 6 hours as needed for Wheezing or Shortness of Breath 120 each 1     No current facility-administered medications on file prior to visit. SUBJECTIVE:     Review of Systems   Constitutional: Negative for activity change, fatigue and fever. HENT: Negative for congestion, ear pain, rhinorrhea and sore throat. Respiratory: Positive for apnea (stable on CPAP). Negative for cough, chest tightness and shortness of breath. Cardiovascular: Positive for leg swelling (right lower leg). Negative for chest pain and palpitations. Gastrointestinal: Negative for abdominal distention, abdominal pain, constipation, diarrhea, nausea and vomiting. Endocrine: Negative for polydipsia, polyphagia and polyuria. Genitourinary: Negative for difficulty urinating and dysuria. Musculoskeletal: Negative for arthralgias, back pain and myalgias. Skin: Negative for rash. Allergic/Immunologic: Positive for environmental allergies (stable). Neurological: Negative for dizziness, weakness, light-headedness and headaches. Hematological: Negative for adenopathy. Psychiatric/Behavioral: Negative for agitation and behavioral problems. The patient is not nervous/anxious. OBJECTIVE:  /80   Pulse 65   Temp 97 °F (36.1 °C)   Wt 240 lb (108.9 kg)   SpO2 97%   BMI 39.94 kg/m²     Physical Exam  Vitals and nursing note reviewed. Constitutional:       General: He is not in acute distress. Appearance: Normal appearance. He is well-developed. HENT:      Head: Normocephalic and atraumatic. Cardiovascular:      Rate and Rhythm: Normal rate and regular rhythm. Heart sounds: Normal heart sounds. No murmur heard. Pulmonary:      Effort: Pulmonary effort is normal. No respiratory distress. Breath sounds: Normal breath sounds. Chest:      Chest wall: No tenderness. Abdominal:      General: Bowel sounds are normal.      Palpations: Abdomen is soft. Tenderness: There is no abdominal tenderness. Musculoskeletal:         General: Normal range of motion. Cervical back: Normal range of motion. Right lower leg: Deformity (buldge noted to posterior calf) and tenderness (Positive Umberto's sign) present. 2+ Edema present. Left lower leg: No edema. Skin:     General: Skin is warm and dry. Findings: No rash. Neurological:      Mental Status: He is alert and oriented to person, place, and time. Psychiatric:         Mood and Affect: Mood normal.         Behavior: Behavior is cooperative. ASSESSMENT:   Diagnosis Orders   1. Essential hypertension  CBC    Comprehensive Metabolic Panel    Lipid Panel   2. Chronic obstructive pulmonary disease, unspecified COPD type ([facility] [address]     3. Mild intermittent asthma without complication     4. Hyperglycemia  Hemoglobin A1C   5. Diastolic dysfunction     6. Left ventricular diastolic dysfunction     7. OLIVIA on CPAP     8. Seasonal allergic rhinitis, unspecified trigger     9. Need for vaccination  INFLUENZA, QUADV, ADJUVANTED, 65 YRS =, IM, PF, PREFILL SYR, 0.5ML (FLUAD)   10. Pain and swelling of left lower leg  US DUP LOWER EXTREMITY RIGHT NAKUL     PLAN:  1. Essential hypertension  - Stable: Medication re-filled as needed, con't medications as prescribed, con't current tx plan  - Continue Amlodipine 10 mg daily as previously prescribed. - Continue with ASA 81 mg daily as previously prescribed. - He does relate that he did follow up with Dr. Mario Lares for cardiac clearance. Per patient, he underwent a stress test, which was normal.   - He has not followed up since  - CBC; Future  - Comprehensive Metabolic Panel; Future  - Lipid Panel; Future    2. Chronic obstructive pulmonary disease, unspecified COPD type ([facility] [address]  3.  Mild intermittent asthma without complication  - Stable: unchanged, continue current treatments. - On this date October 14, 2021,  I have spent greater than 50% of this visit reviewing previous notes, test results and/or face to face with the patient discussing the diagnoses, importance of compliance with the treatment plan, counseling, coordinating care as well as documenting on the day of the visit.      Issac Linda, APRN-CNP

## 2022-03-04 ENCOUNTER — NURSE TRIAGE (OUTPATIENT)
Dept: OTHER | Facility: CLINIC | Age: 67
End: 2022-03-04

## 2022-03-04 ENCOUNTER — TELEMEDICINE (OUTPATIENT)
Dept: FAMILY MEDICINE CLINIC | Age: 67
End: 2022-03-04
Payer: MEDICAID

## 2022-03-04 DIAGNOSIS — R06.02 SOB (SHORTNESS OF BREATH): Primary | ICD-10-CM

## 2022-03-04 DIAGNOSIS — J45.20 MILD INTERMITTENT ASTHMATIC BRONCHITIS WITHOUT COMPLICATION: ICD-10-CM

## 2022-03-04 PROCEDURE — 99213 OFFICE O/P EST LOW 20 MIN: CPT | Performed by: NURSE PRACTITIONER

## 2022-03-04 RX ORDER — PREDNISONE 20 MG/1
20 TABLET ORAL DAILY
Qty: 5 TABLET | Refills: 0 | Status: SHIPPED | OUTPATIENT
Start: 2022-03-04 | End: 2022-03-09

## 2022-03-04 RX ORDER — AZITHROMYCIN 250 MG/1
250 TABLET, FILM COATED ORAL SEE ADMIN INSTRUCTIONS
Qty: 6 TABLET | Refills: 0 | Status: SHIPPED | OUTPATIENT
Start: 2022-03-04 | End: 2022-03-09

## 2022-03-04 RX ORDER — ALBUTEROL SULFATE 2.5 MG/3ML
2.5 SOLUTION RESPIRATORY (INHALATION) EVERY 6 HOURS PRN
Qty: 120 EACH | Refills: 1 | Status: SHIPPED | OUTPATIENT
Start: 2022-03-04

## 2022-03-04 ASSESSMENT — ENCOUNTER SYMPTOMS
NAUSEA: 0
SORE THROAT: 0
ABDOMINAL PAIN: 0
SHORTNESS OF BREATH: 1
SINUS PAIN: 0
COUGH: 1
DIARRHEA: 0
BACK PAIN: 0
SINUS PRESSURE: 0
COLOR CHANGE: 0
RHINORRHEA: 0
VOICE CHANGE: 1
ABDOMINAL DISTENTION: 0
CONSTIPATION: 0
CHEST TIGHTNESS: 0

## 2022-03-04 NOTE — TELEPHONE ENCOUNTER
Received call from [facility] at [facility] with [email or web address]. Subjective: Caller states \"right now spitting up yellow mucous. When talking will get sob. Not feeling feverish. A little cough\"     Current Symptoms: sob, spitting up phlegm  No sob at rest  Denies chest pain    Onset: 5 days ago; gradual    Associated Symptoms: NA    Pain Severity: denies pain    Temperature: no fever    What has been tried: nothing    LMP: NA Pregnant: NA    Recommended disposition: Go to Office Now    Care advice provided, patient verbalizes understanding; denies any other questions or concerns; instructed to call back for any new or worsening symptoms. Patient/Caller agrees with recommended disposition; writer provided warm transfer to Janie Kahn at [facility] for appointment scheduling     Attention Provider: Thank you for allowing me to participate in the care of your patient. The patient was connected to triage in response to information provided to the ECC/PSC. Please do not respond through this encounter as the response is not directed to a shared pool.     Reason for Disposition   MILD difficulty breathing (e.g., minimal/no SOB at rest, SOB with walking, pulse < 100) of new-onset or worse than normal    Protocols used: BREATHING DIFFICULTY-ADULT-OH

## 2022-03-04 NOTE — PROGRESS NOTES
Frances Baca, APRN-CNP  [address] [facility]  [id number]  [address] [address]  Dept: [phone]  Dept Fax: [phone]    Patient ID: Helena Guillaume is a 79 y.o. male. HPI     Subjective:     Helena Guillaume is a 79 y.o. male who presents today via virtual visit complaining of sinus congestion, postnasal drip, loosing voice, cough with yellow and shortness of breath for 1 week ago. He is using inhaler 3-4 times a day. Pt states he has more congestion in the morning and over the counter medications are not helping. His nebulizer does seem to help but he is running out of it. Pt denies any fever or chills. Pt today denies any SEVERINO or chest pain Pt denies any N/V/D/C or abdominal pain. Otherwise pt doing well on current tx and no other concerns today.      The patient's past medical, surgical, social, and family history as well as his current medications and allergies were reviewed as documented in today's encounter by ENMA Mata     Current Outpatient Medications on File Prior to Visit   Medication Sig Dispense Refill    amLODIPine (NORVASC) 10 MG tablet take 1 tablet by mouth once daily 90 tablet 1    aspirin (ASPIRIN LOW DOSE) 81 MG EC tablet take 1 tablet by mouth once daily 90 tablet 1    fluticasone (FLONASE) 50 MCG/ACT nasal spray 1-2 sprays by Nasal route daily as needed for Rhinitis or Allergies 3 Bottle 1    cetirizine (ZYRTEC) 10 MG tablet Take 1 tablet by mouth nightly as needed for Allergies or Rhinitis 90 tablet 1    budesonide-formoterol (SYMBICORT) 80-4.5 MCG/ACT AERO Inhale 2 puffs into the lungs 2 times daily 3 Inhaler 1    albuterol sulfate HFA (PROAIR HFA) 108 (90 Base) MCG/ACT inhaler Inhale 2 puffs into the lungs every 4 hours as needed for Wheezing or Shortness of Breath 3 Inhaler 1    albuterol (PROVENTIL) (2.5 MG/3ML) 0.083% nebulizer solution Take 3 mLs by nebulization every 6 hours as needed for Wheezing or Shortness of Breath 120 each 1     No current facility-administered medications on file prior to visit. Review of Systems   Constitutional: Negative for activity change, fatigue and fever. HENT: Positive for congestion, postnasal drip and voice change (losing voice). Negative for ear pain, rhinorrhea, sinus pressure, sinus pain and sore throat. Respiratory: Positive for cough (yellow) and shortness of breath. Negative for chest tightness. Cardiovascular: Negative for chest pain and palpitations. Gastrointestinal: Negative for abdominal distention, abdominal pain, constipation, diarrhea and nausea. Endocrine: Negative for polydipsia, polyphagia and polyuria. Genitourinary: Negative for difficulty urinating and dysuria. Musculoskeletal: Negative for arthralgias, back pain and myalgias. Skin: Negative for color change and rash. Neurological: Negative for dizziness, weakness, light-headedness and headaches. Hematological: Negative for adenopathy. Psychiatric/Behavioral: Negative for agitation and behavioral problems. The patient is not nervous/anxious. Objective: Allergies   Allergen Reactions    Mobic [Meloxicam]      Patient was hospitalized after taking this medication.  Affected liver/  Caused bleeding   ,   Past Medical History:   Diagnosis Date    Acute hepatitis     secondary to Mobic medication effects pt states    Aneurysm of ascending aorta ([facility] [address]     Asthma     Epilepsia [facility])     last seizure known was when he was 32years old    Family history of brain cancer 10/2/2017    Hematuria 6/30/2015    History of total knee replacement 2/16/2017    Hypertension     Osteoarthritis     Thoracic degenerative disc disease 12/21/2017   ,   Past Surgical History:   Procedure Laterality Date    COLONOSCOPY  07/10/2015    DR NEELIMA GALARZA - diverticula    INGUINAL HERNIA REPAIR Right     JOINT REPLACEMENT Left 01/03/2018    JOINT REPLACEMENT Left 01/03/2017  TOTAL KNEE ARTHROPLASTY  11/02/2016    right   ,   Social History     Tobacco Use    Smoking status: Never Smoker    Smokeless tobacco: Never Used   Vaping Use    Vaping Use: Never used   Substance Use Topics    Alcohol use: No     Alcohol/week: 0.0 standard drinks    Drug use: No   ,   Family History   Problem Relation Age of Onset    Heart Disease Father         chf    Cancer Sister         Brain Cancer   ,   Immunization History   Administered Date(s) Administered    COVID-19, J&J, PF, 0.5 mL 03/10/2021    Influenza Virus Vaccine 09/09/2016, 10/02/2017    Influenza, Quadv, IM, PF (6 mo and older Fluzone, Flulaval, Fluarix, and 3 yrs and older Afluria) 09/09/2016, 10/02/2017, 10/16/2019    Influenza, Quadv, adjuvanted, 65 yrs +, IM, PF (Fluad) 10/19/2020, 10/14/2021    Pneumococcal Conjugate 13-valent ([id number]) 09/09/2016    Pneumococcal Polysaccharide ([id number]) 10/02/2017    Zoster Recombinant (Shingrix) 07/13/2020   ,   Health Maintenance   Topic Date Due    DTaP/Tdap/Td vaccine (1 - Tdap) Never done    Shingles Vaccine (2 of 2) 09/07/2020    COVID-19 Vaccine (2 - Booster for Natalee series) 05/05/2021    Potassium monitoring  08/11/2021    Creatinine monitoring  08/11/2021    Lipid screen  03/31/2022    Depression Screen  04/06/2022    Pneumococcal 65+ years Vaccine (2 of 2 - PPSV23) 10/02/2022    Colorectal Cancer Screen  07/10/2025    Flu vaccine  Completed    Hepatitis C screen  Completed    Hepatitis A vaccine  Aged Out    Hepatitis B vaccine  Aged Out    Hib vaccine  Aged Out    Meningococcal (ACWY) vaccine  Aged Out       PHYSICAL EXAMINATION:  [ INSTRUCTIONS:  \"[x]\" Indicates a positive item  \"[]\" Indicates a negative item  -- DELETE ALL ITEMS NOT EXAMINED]  Vital Signs: Not completed due to virtual visit.      Constitutional: [x] Appears well-developed and well-nourished [x] No apparent distress                            [] Abnormal-   Mental status  [x] Alert and awake  [x] Oriented to person/place/time [x]Able to follow commands       Eyes:  EOM    [x]  Normal  [] Abnormal-  Sclera  [x]  Normal  [] Abnormal -         Discharge [x]  None visible  [] Abnormal -     HENT:   [x] Normocephalic, atraumatic. [] Abnormal   [x] Mouth/Throat: Mucous membranes are moist.      External Ears [x] Normal  [] Abnormal-      Neck: [x] No visualized mass      Pulmonary/Chest: [x] Respiratory effort normal.  [x] No visualized signs of difficulty breathing or respiratory distress        [] Abnormal-      Neurological:        [x] No Facial Asymmetry (Cranial nerve 7 motor function) (limited exam to video visit)                       [x] No gaze palsy        [] Abnormal-         Skin:                     [x] No significant exanthematous lesions or discoloration noted on facial skin         [] Abnormal-                                  Psychiatric:           [x] Normal Affect [x] No Hallucinations        [] Abnormal-      Other pertinent observable physical exam findings- Patient appears generally well, is speaking full sentences clearly with slight observable SOB, no cough, no diaphoresis. Pt stable during video. Assessment:      Diagnosis Orders   1. SOB (shortness of breath)  albuterol (PROVENTIL) (2.5 MG/3ML) 0.083% nebulizer solution    predniSONE (DELTASONE) 20 MG tablet   2. Mild intermittent asthmatic bronchitis without complication  albuterol (PROVENTIL) (2.5 MG/3ML) 0.083% nebulizer solution    azithromycin (ZITHROMAX) 250 MG tablet    predniSONE (DELTASONE) 20 MG tablet         Plan:     SOB (shortness of breath)  Mild intermittent asthmatic bronchitis without complication  - start azithromycin and prednisone for 5 days. - albuterol as needed. - rest and increase fliuds.    - call if symptoms worsen or do not improve.     -For additional symptom relief, I suggest the following over-the-counter remedies:    For fevers or pain: acetaminophen (Tylenol) or ibuprofen (Advil, Motrin) or naproxen (Aleve)    For congestion or sinus pressure: medications containing guaifenesin to help break up mucus, such as Mucinex or Robitussin, medications containing pseudoephedrine or phenylephrine, such as Sudafed and nasal steroid sprays, such as Flonase, Sensimist, Rhinocort or Nasonex   For runny nose, sneezing or watery/itchy eyes: less sedating antihistamines, such as loratidine (Claritin), fexofenadine (Allegra) or Cetirizine (Zyrtec)     - Rest of systems unchanged, continue current treatments. - Medications, labs, diagnostic studies, consultations and follow-up as documented in this encounter. Rest of systems unchanged, continue current treatments    Olamide No is a 79 y.o. male being evaluated by a Virtual Visit (video visit) encounter to address concerns as mentioned above. A caregiver was present when appropriate. Due to this being a TeleHealth encounter (During [facility]- public [facility] emergency), evaluation of the following organ systems was limited: Vitals/Constitutional/EENT/Resp/CV/GI//MS/Neuro/Skin/Heme-Lymph-Imm. Pursuant to the emergency declaration under the [address] [place] [address] authority and the [employer] and [employer] Act, this Virtual Visit was conducted with patient's (and/or legal guardian's) consent, to reduce the patient's risk of exposure to COVID-19 and provide necessary medical care. The patient (and/or legal guardian) has also been advised to contact this office for worsening conditions or problems, and seek emergency medical treatment and/or call 911 if deemed necessary. Patient identification was verified at the start of the visit: Yes    Total time spent for this encounter: Not billed by time    Services were provided through a video synchronous discussion virtually to substitute for in-person clinic visit. Patient and provider were located at their individual homes.     --Mimi El Moncho Eubanks CNP on 3/4/2022 at 3:18 PM    An electronic signature was used to authenticate this note.       Abiola Hogue, APRN-CNP

## 2022-05-17 ENCOUNTER — OFFICE VISIT (OUTPATIENT)
Dept: FAMILY MEDICINE CLINIC | Age: 67
End: 2022-05-17
Payer: MEDICAID

## 2022-05-17 VITALS
BODY MASS INDEX: 40.15 KG/M2 | DIASTOLIC BLOOD PRESSURE: 96 MMHG | SYSTOLIC BLOOD PRESSURE: 138 MMHG | WEIGHT: 241 LBS | HEART RATE: 76 BPM | TEMPERATURE: 98.6 F | HEIGHT: 65 IN | OXYGEN SATURATION: 98 %

## 2022-05-17 DIAGNOSIS — G47.33 OSA ON CPAP: ICD-10-CM

## 2022-05-17 DIAGNOSIS — I51.9 LEFT VENTRICULAR DIASTOLIC DYSFUNCTION: ICD-10-CM

## 2022-05-17 DIAGNOSIS — Z99.89 OSA ON CPAP: ICD-10-CM

## 2022-05-17 DIAGNOSIS — J44.9 CHRONIC OBSTRUCTIVE PULMONARY DISEASE, UNSPECIFIED COPD TYPE (HCC): ICD-10-CM

## 2022-05-17 DIAGNOSIS — R73.9 HYPERGLYCEMIA: ICD-10-CM

## 2022-05-17 DIAGNOSIS — J45.20 MILD INTERMITTENT ASTHMA WITHOUT COMPLICATION: ICD-10-CM

## 2022-05-17 DIAGNOSIS — I10 ESSENTIAL HYPERTENSION: Primary | ICD-10-CM

## 2022-05-17 DIAGNOSIS — J30.2 CHRONIC SEASONAL ALLERGIC RHINITIS: ICD-10-CM

## 2022-05-17 DIAGNOSIS — J30.2 SEASONAL ALLERGIC RHINITIS, UNSPECIFIED TRIGGER: ICD-10-CM

## 2022-05-17 DIAGNOSIS — E55.9 VITAMIN D DEFICIENCY: ICD-10-CM

## 2022-05-17 DIAGNOSIS — K40.90 LEFT INGUINAL HERNIA: ICD-10-CM

## 2022-05-17 PROCEDURE — 99214 OFFICE O/P EST MOD 30 MIN: CPT | Performed by: NURSE PRACTITIONER

## 2022-05-17 RX ORDER — CETIRIZINE HYDROCHLORIDE 10 MG/1
10 TABLET ORAL NIGHTLY PRN
Qty: 90 TABLET | Refills: 1 | Status: SHIPPED | OUTPATIENT
Start: 2022-05-17

## 2022-05-17 RX ORDER — ASPIRIN 81 MG/1
TABLET ORAL
Qty: 90 TABLET | Refills: 1 | Status: SHIPPED | OUTPATIENT
Start: 2022-05-17

## 2022-05-17 RX ORDER — BUDESONIDE AND FORMOTEROL FUMARATE DIHYDRATE 80; 4.5 UG/1; UG/1
2 AEROSOL RESPIRATORY (INHALATION) 2 TIMES DAILY
Qty: 3 EACH | Refills: 5 | Status: SHIPPED | OUTPATIENT
Start: 2022-05-17

## 2022-05-17 RX ORDER — FLUTICASONE PROPIONATE 50 MCG
1-2 SPRAY, SUSPENSION (ML) NASAL DAILY PRN
Qty: 3 EACH | Refills: 5 | Status: SHIPPED | OUTPATIENT
Start: 2022-05-17

## 2022-05-17 RX ORDER — ALBUTEROL SULFATE 90 UG/1
2 AEROSOL, METERED RESPIRATORY (INHALATION) EVERY 4 HOURS PRN
Qty: 3 EACH | Refills: 5 | Status: SHIPPED | OUTPATIENT
Start: 2022-05-17

## 2022-05-17 RX ORDER — AMLODIPINE BESYLATE 10 MG/1
TABLET ORAL
Qty: 90 TABLET | Refills: 1 | Status: SHIPPED | OUTPATIENT
Start: 2022-05-17

## 2022-05-17 ASSESSMENT — ENCOUNTER SYMPTOMS
VOMITING: 0
RHINORRHEA: 0
NAUSEA: 0
BACK PAIN: 0
ABDOMINAL PAIN: 0
CHEST TIGHTNESS: 0
SORE THROAT: 0
APNEA: 1
DIARRHEA: 0
CONSTIPATION: 0
SHORTNESS OF BREATH: 0
ABDOMINAL DISTENTION: 0
COUGH: 0

## 2022-05-17 ASSESSMENT — PATIENT HEALTH QUESTIONNAIRE - PHQ9
SUM OF ALL RESPONSES TO PHQ QUESTIONS 1-9: 0
SUM OF ALL RESPONSES TO PHQ QUESTIONS 1-9: 0
SUM OF ALL RESPONSES TO PHQ9 QUESTIONS 1 & 2: 0
1. LITTLE INTEREST OR PLEASURE IN DOING THINGS: 0
2. FEELING DOWN, DEPRESSED OR HOPELESS: 0
SUM OF ALL RESPONSES TO PHQ QUESTIONS 1-9: 0
SUM OF ALL RESPONSES TO PHQ QUESTIONS 1-9: 0

## 2022-05-17 NOTE — PATIENT INSTRUCTIONS
[address] [facility]- MD [address], [address] [facility], [address]  [address], [address] [facility]. [address]  [place], [place], [address] [phone]

## 2022-05-17 NOTE — PROGRESS NOTES
Awilda Piedra, APRN-CNP  [address] [facility]  [id number]  [address] [address]  Dept: [phone]  Dept Fax: [phone]     PATIENT ID: Yohana Yeh is a 79 y.o. male. HPI:  Established pt here today for f/u on chronic medical problems; HTN, COPD, asthma, allergies, hyperglycemia,, go over labs and/or diagnostic studies, and medication refills. Pt denies any fever or chills. Pt today denies any HA, chest pain, or SOB. Pt denies any N/V/D/C or abdominal pain. Pt states asthma is stable on inhalers. Pt states COPD is stable on inhalers. Pt stable on CPAP with his sleep apnea and has been using nightly. Pt states his allergies are stable on meds. He complains of an ongoing left inguinal hernia that he wants to have fixed. It is starting to become more painful. My previous office notes, labs and diagnostic studies were reviewed prior to and during encounter. The patient's past medical, surgical, social, and family history as well as current medications and allergies were reviewed as documented in today's encounter by ENMA Torres. Current Outpatient Medications on File Prior to Visit   Medication Sig Dispense Refill    albuterol (PROVENTIL) (2.5 MG/3ML) 0.083% nebulizer solution Take 3 mLs by nebulization every 6 hours as needed for Wheezing or Shortness of Breath 120 each 1     No current facility-administered medications on file prior to visit. SUBJECTIVE:     Review of Systems   Constitutional: Negative for activity change, fatigue and fever. HENT: Negative for congestion, ear pain, rhinorrhea and sore throat. Respiratory: Positive for apnea (stable on CPAP). Negative for cough, chest tightness and shortness of breath. Cardiovascular: Negative for chest pain and palpitations. Leg swelling: right lower leg.    Gastrointestinal: Negative for abdominal distention, abdominal pain, constipation, diarrhea, nausea and vomiting. Endocrine: Negative for polydipsia, polyphagia and polyuria. Genitourinary: Negative for difficulty urinating and dysuria. Left inguinal hernia   Musculoskeletal: Negative for arthralgias, back pain and myalgias. Skin: Negative for rash. Allergic/Immunologic: Positive for environmental allergies (stable). Neurological: Negative for dizziness, weakness, light-headedness and headaches. Hematological: Negative for adenopathy. Psychiatric/Behavioral: Negative for agitation and behavioral problems. The patient is not nervous/anxious. OBJECTIVE:  BP (!) 138/96   Pulse 76   Temp 98.6 °F (37 °C)   Ht 5' 5\" (1.651 m)   Wt 241 lb (109.3 kg)   SpO2 98%   BMI 40.10 kg/m²     Physical Exam  Vitals and nursing note reviewed. Constitutional:       General: He is not in acute distress. Appearance: Normal appearance. He is well-developed. HENT:      Head: Normocephalic and atraumatic. Cardiovascular:      Rate and Rhythm: Normal rate and regular rhythm. Heart sounds: Normal heart sounds. No murmur heard. Pulmonary:      Effort: Pulmonary effort is normal. No respiratory distress. Breath sounds: Normal breath sounds. Chest:      Chest wall: No tenderness. Abdominal:      General: Bowel sounds are normal.      Palpations: Abdomen is soft. Tenderness: There is no abdominal tenderness. Musculoskeletal:         General: Normal range of motion. Cervical back: Normal range of motion. Right lower leg: No edema. Left lower leg: No edema. Skin:     General: Skin is warm and dry. Findings: No rash. Neurological:      Mental Status: He is alert and oriented to person, place, and time. Psychiatric:         Mood and Affect: Mood normal.         Behavior: Behavior is cooperative. ASSESSMENT:   Diagnosis Orders   1.  Essential hypertension  aspirin (ASPIRIN LOW DOSE) 81 MG EC tablet    amLODIPine (NORVASC) 10 MG tablet    CBC Comprehensive Metabolic Panel    Lipid Panel   2. Chronic obstructive pulmonary disease, unspecified COPD type ([facility])  fluticasone (FLONASE) 50 MCG/ACT nasal spray    albuterol sulfate HFA (PROAIR HFA) 108 (90 Base) MCG/ACT inhaler    DEVORA Ibrahim MD, Pulmonology, [place]   3. Mild intermittent asthma without complication  budesonide-formoterol (SYMBICORT) 80-4.5 MCG/ACT AERO    DEVORA Ibrahim MD, Pulmonology, [place]   4. Hyperglycemia  Hemoglobin A1C   5. Left ventricular diastolic dysfunction     6. OLIVIA on CPAP     7. Seasonal allergic rhinitis, unspecified trigger     8. Chronic seasonal allergic rhinitis  fluticasone (FLONASE) 50 MCG/ACT nasal spray    cetirizine (ZYRTEC) 10 MG tablet   9. Left inguinal hernia     10. Vitamin D deficiency  Vitamin D 25 Hydroxy     PLAN:  1. Essential hypertension  - Stable: Medication re-filled as needed, con't medications as prescribed, con't current tx plan  - Continue Amlodipine 10 mg daily as previously prescribed. - Continue with ASA 81 mg daily as previously prescribed. - He does relate that he did follow up with Dr. Halima Ortiz for cardiac clearance. Per patient, he underwent a stress test, which was normal.   - He has not followed up since. I did recommend calling to schedule follow up    2. Chronic obstructive pulmonary disease, unspecified COPD type ([facility] [address]  3. Mild intermittent asthma without complication  - Stable: Medication re-filled as needed, con't medications as prescribed, con't current tx plan  - Continue with Symbicort BID as previously prescribed. - Continue with Albuterol as previously prescribed. - Avoid triggers that may exacerbate symptoms. 4. Hyperglycemia  - He did not get labs prior to today's visit  - Advised to decrease, fats, carbohydrates, and engage in a healthier diet overall  - Encouraged 40 minutes of aerobic exercise 3-4 times/week.     5. Left ventricular diastolic dysfunction  - Stable: Medication re-filled as needed, con't medications as prescribed, con't current tx plan  - Will cont to follow with Dr Amy Kirkland as instructed  - Last echo 2/2021 with EF of 55 %  - Low NA diet  - Patient instructed to call if CP, SOB, increased weight gain, increased edema or new onset congested cough. Patient verbalizes understanding.  - Discussed monitoring weights closely and to call for any weight gain of >3 lbs in 24 hours/5 lbs in 7 days  - Cont with increase exercise and aim for 150 minutes of aerobic activity per week    6. OLIVIA on CPAP  - Stable: Medication re-filled as needed, con't medications as prescribed, con't current tx plan  - Pt is consistent with wearing her CPAP at night. 7. Seasonal allergic rhinitis, unspecified trigger  - Stable: Medication re-filled as needed, con't medications as prescribed, con't current tx plan  - Continue Claritin 10 mg daily as previously prescribed  - Continue with Flonase as previously prescribed. 9. Left inguinal hernia  - CT abdomen from 2018 confirms hernia  - Saw Dr. Marilyn Berman in 2020 then Dr. Jennifer Calles in 2021  - We have had multiple discussions regarding the surgery and given his extensive asthma, COPD, OLIVIA, HTN, CHF history he will need to be cleared by both pulmonary and cardiology. - Multiple referrals have been placed to pulmonology but patient has not been compliant with adequate follow up  - Once he sees cardiology and pulmonary for clearance, he has been instructed to call Dr. Cyndee Cho office, as it has been since January of 2021 since his last appointment.      - Rest of systems unchanged, continue current treatments. - On this date May 17, 2022,  I have spent greater than 50% of this visit reviewing previous notes, test results and/or face to face with the patient discussing the diagnoses, importance of compliance with the treatment plan, counseling, coordinating care as well as documenting on the day of the visit.      Geo Bailey, ADONAY-CNP

## 2022-06-08 ENCOUNTER — TELEPHONE (OUTPATIENT)
Dept: FAMILY MEDICINE CLINIC | Age: 67
End: 2022-06-08

## 2022-06-08 DIAGNOSIS — J44.9 CHRONIC OBSTRUCTIVE PULMONARY DISEASE, UNSPECIFIED COPD TYPE (HCC): Primary | ICD-10-CM

## 2022-06-08 NOTE — TELEPHONE ENCOUNTER
Yumi Guillermo from [address] states that the patient called and scheduled his pulmonary function test. Yumi Guillermo states the patients order is from 2020 and would like an updated order for the patient. Yumi Guillermo is wondering if PCP can put a new order for the pulmonary function test in the system. Yumi Guillermo states patient scheduled the test for June 23rd. Please Advise.

## 2022-06-23 ENCOUNTER — HOSPITAL ENCOUNTER (OUTPATIENT)
Dept: PULMONOLOGY | Age: 67
Discharge: HOME | End: 2022-06-23
Payer: COMMERCIAL

## 2022-06-23 DIAGNOSIS — J44.9 CHRONIC OBSTRUCTIVE PULMONARY DISEASE, UNSPECIFIED COPD TYPE (HCC): ICD-10-CM

## 2022-06-23 PROCEDURE — 94726 PLETHYSMOGRAPHY LUNG VOLUMES: CPT

## 2022-06-23 PROCEDURE — 94664 DEMO&/EVAL PT USE INHALER: CPT

## 2022-06-23 PROCEDURE — 94729 DIFFUSING CAPACITY: CPT

## 2022-06-23 PROCEDURE — 94640 AIRWAY INHALATION TREATMENT: CPT

## 2022-06-23 PROCEDURE — 94060 EVALUATION OF WHEEZING: CPT

## 2022-06-25 NOTE — PROCEDURES
[address] [facility] [address]                               PULMONARY FUNCTION    PATIENT NAME: David Chaidez                    :        1955  MED REC NO:   [id number]                             ROOM:  ACCOUNT NO:   [de-identified]                           ADMIT DATE: 2022  PROVIDER:     Cherelle Kohler    DATE OF PROCEDURE:  2022    The patient's spirometry is normal with normal flow-volume loop. FEV1  103% predicted, % predicted. There was 2% bronchodilator change  in FEV1, but no positive bronchodilator change in FVC. FEV1/FVC ratio is 75, post-bronchodilator 78. Total lung capacity 80% predicted, RV is 76% predicted, and diffusion  capacity uncorrected 106% predicted. Corrected for alveolar volume 135%  predicted and airway resistance is increased. FINAL IMPRESSION:  Normal spirometry, normal total lung capacity and  diffusion capacity. Clinical correlation advised.         Adriel Ann    D: 2022 16:49:39       T: 2022 16:51:58     [email or web address]  Job#: [id number]     Doc#: [id number]    CC:

## 2022-12-02 ENCOUNTER — OFFICE VISIT (OUTPATIENT)
Dept: FAMILY MEDICINE CLINIC | Age: 67
End: 2022-12-02

## 2022-12-02 VITALS
HEART RATE: 82 BPM | SYSTOLIC BLOOD PRESSURE: 130 MMHG | HEIGHT: 65 IN | DIASTOLIC BLOOD PRESSURE: 84 MMHG | BODY MASS INDEX: 39.32 KG/M2 | WEIGHT: 236 LBS | OXYGEN SATURATION: 98 % | TEMPERATURE: 97.9 F

## 2022-12-02 DIAGNOSIS — J30.2 SEASONAL ALLERGIC RHINITIS, UNSPECIFIED TRIGGER: ICD-10-CM

## 2022-12-02 DIAGNOSIS — J40 BRONCHITIS: ICD-10-CM

## 2022-12-02 DIAGNOSIS — J45.20 MILD INTERMITTENT ASTHMA WITHOUT COMPLICATION: ICD-10-CM

## 2022-12-02 DIAGNOSIS — K40.90 LEFT INGUINAL HERNIA: ICD-10-CM

## 2022-12-02 DIAGNOSIS — Z00.00 MEDICARE ANNUAL WELLNESS VISIT, SUBSEQUENT: Primary | ICD-10-CM

## 2022-12-02 DIAGNOSIS — J44.9 CHRONIC OBSTRUCTIVE PULMONARY DISEASE, UNSPECIFIED COPD TYPE (HCC): ICD-10-CM

## 2022-12-02 DIAGNOSIS — G47.33 OSA ON CPAP: ICD-10-CM

## 2022-12-02 DIAGNOSIS — R73.9 HYPERGLYCEMIA: ICD-10-CM

## 2022-12-02 DIAGNOSIS — I51.9 LEFT VENTRICULAR DIASTOLIC DYSFUNCTION: ICD-10-CM

## 2022-12-02 DIAGNOSIS — Z99.89 OSA ON CPAP: ICD-10-CM

## 2022-12-02 DIAGNOSIS — K21.9 GASTROESOPHAGEAL REFLUX DISEASE WITHOUT ESOPHAGITIS: ICD-10-CM

## 2022-12-02 DIAGNOSIS — I10 ESSENTIAL HYPERTENSION: ICD-10-CM

## 2022-12-02 RX ORDER — ALBUTEROL SULFATE 90 UG/1
2 AEROSOL, METERED RESPIRATORY (INHALATION) EVERY 4 HOURS PRN
Qty: 3 EACH | Refills: 3 | Status: SHIPPED | OUTPATIENT
Start: 2022-12-02

## 2022-12-02 RX ORDER — AZITHROMYCIN 250 MG/1
250 TABLET, FILM COATED ORAL SEE ADMIN INSTRUCTIONS
Qty: 6 TABLET | Refills: 0 | Status: SHIPPED | OUTPATIENT
Start: 2022-12-02 | End: 2022-12-07

## 2022-12-02 RX ORDER — ASPIRIN 81 MG/1
TABLET ORAL
Qty: 90 TABLET | Refills: 3 | Status: SHIPPED | OUTPATIENT
Start: 2022-12-02

## 2022-12-02 RX ORDER — ALBUTEROL SULFATE 2.5 MG/3ML
2.5 SOLUTION RESPIRATORY (INHALATION) EVERY 6 HOURS PRN
Qty: 120 EACH | Refills: 3 | Status: SHIPPED | OUTPATIENT
Start: 2022-12-02

## 2022-12-02 RX ORDER — AMLODIPINE BESYLATE 10 MG/1
TABLET ORAL
Qty: 90 TABLET | Refills: 3 | Status: SHIPPED | OUTPATIENT
Start: 2022-12-02

## 2022-12-02 RX ORDER — FLUTICASONE PROPIONATE 50 MCG
1-2 SPRAY, SUSPENSION (ML) NASAL DAILY PRN
Qty: 3 EACH | Refills: 5 | Status: SHIPPED | OUTPATIENT
Start: 2022-12-02

## 2022-12-02 RX ORDER — CALCIUM CARBONATE 200(500)MG
1 TABLET,CHEWABLE ORAL DAILY
COMMUNITY

## 2022-12-02 RX ORDER — CETIRIZINE HYDROCHLORIDE 10 MG/1
10 TABLET ORAL DAILY
Qty: 90 TABLET | Refills: 3 | Status: SHIPPED | OUTPATIENT
Start: 2022-12-02 | End: 2023-03-02

## 2022-12-02 RX ORDER — BUDESONIDE AND FORMOTEROL FUMARATE DIHYDRATE 80; 4.5 UG/1; UG/1
2 AEROSOL RESPIRATORY (INHALATION) 2 TIMES DAILY
Qty: 3 EACH | Refills: 5 | Status: SHIPPED | OUTPATIENT
Start: 2022-12-02

## 2022-12-02 RX ORDER — PREDNISONE 20 MG/1
40 TABLET ORAL DAILY
Qty: 10 TABLET | Refills: 0 | Status: SHIPPED | OUTPATIENT
Start: 2022-12-02 | End: 2022-12-07

## 2022-12-02 RX ORDER — PANTOPRAZOLE SODIUM 40 MG/1
40 TABLET, DELAYED RELEASE ORAL
Qty: 90 TABLET | Refills: 3 | Status: SHIPPED | OUTPATIENT
Start: 2022-12-02

## 2022-12-02 SDOH — ECONOMIC STABILITY: FOOD INSECURITY: WITHIN THE PAST 12 MONTHS, THE FOOD YOU BOUGHT JUST DIDN'T LAST AND YOU DIDN'T HAVE MONEY TO GET MORE.: NEVER TRUE

## 2022-12-02 SDOH — ECONOMIC STABILITY: FOOD INSECURITY: WITHIN THE PAST 12 MONTHS, YOU WORRIED THAT YOUR FOOD WOULD RUN OUT BEFORE YOU GOT MONEY TO BUY MORE.: NEVER TRUE

## 2022-12-02 ASSESSMENT — PATIENT HEALTH QUESTIONNAIRE - PHQ9
1. LITTLE INTEREST OR PLEASURE IN DOING THINGS: 0
SUM OF ALL RESPONSES TO PHQ QUESTIONS 1-9: 0
2. FEELING DOWN, DEPRESSED OR HOPELESS: 0
SUM OF ALL RESPONSES TO PHQ QUESTIONS 1-9: 0
SUM OF ALL RESPONSES TO PHQ9 QUESTIONS 1 & 2: 0

## 2022-12-02 ASSESSMENT — ENCOUNTER SYMPTOMS
COUGH: 0
RHINORRHEA: 0
SHORTNESS OF BREATH: 0
VOMITING: 0
DIARRHEA: 0
APNEA: 1
CONSTIPATION: 0
CHEST TIGHTNESS: 0
ABDOMINAL DISTENTION: 0
SORE THROAT: 0
BACK PAIN: 0
NAUSEA: 0
ABDOMINAL PAIN: 0

## 2022-12-02 ASSESSMENT — SOCIAL DETERMINANTS OF HEALTH (SDOH): HOW HARD IS IT FOR YOU TO PAY FOR THE VERY BASICS LIKE FOOD, HOUSING, MEDICAL CARE, AND HEATING?: NOT HARD AT ALL

## 2022-12-02 ASSESSMENT — LIFESTYLE VARIABLES
HOW MANY STANDARD DRINKS CONTAINING ALCOHOL DO YOU HAVE ON A TYPICAL DAY: PATIENT DOES NOT DRINK
HOW OFTEN DO YOU HAVE A DRINK CONTAINING ALCOHOL: NEVER

## 2022-12-02 NOTE — PROGRESS NOTES
Juan Arriaga, APRN-CNP  [address] [facility]  [id number] [address]  [address] [address]  Dept: [phone]  Dept Fax: [phone]     Medicare Annual Wellness Visit    Cristel Escobar is here for Medicare AWV    Assessment & Plan   1. Medicare annual wellness visit, subsequent  - Positive risk factors, screenings and interventions discussed  - Home safety tips provided  - Continue with annual medicare wellness visits  - Will cont with current treatment as pt is currently stable on current treatment  - Continue with all medications as previously prescribed. - Will cont to follow with all specialists as previously as instructed         Recommendations for Preventive Services Due: see orders and patient instructions/AVS.  Recommended screening schedule for the next 5-10 years is provided to the patient in written form: see Patient Instructions/AVS.     No follow-ups on file. Subjective       Patient's complete Health Risk Assessment and screening values have been reviewed and are found in Flowsheets. The following problems were reviewed today and where indicated follow up appointments were made and/or referrals ordered.     Positive Risk Factor Screenings with Interventions:             General Health and ACP:  General  In general, how would you say your health is?: Good  In the past 7 days, have you experienced any of the following: New or Increased Pain, New or Increased Fatigue, Loneliness, Social Isolation, Stress or Anger?: No  Do you get the social and emotional support that you need?: Yes  Do you have a Living Will?: (!) No    Advance Directives       Power of  Living Will ACP-Advance Directive ACP-Power of     Not on File Not on File Not on File Not on File        General Health Risk Interventions:  No Living Will: Patient declines ACP discussion/assistance    Health Habits/Nutrition:  Physical Activity: Sufficiently Active    Days of Exercise per Week: 7 days    Minutes of Exercise per Session: 30 min     Have you lost any weight without trying in the past 3 months?: No  Body mass index: (!) 39.27  Have you seen the dentist within the past year?: (!) No  Health Habits/Nutrition Interventions:  Inadequate physical activity:  educational materials provided to promote increased physical activity  Nutritional issues:  educational materials for healthy, well-balanced diet provided  Dental exam overdue:  patient declines dental evaluation    Hearing/Vision:  Do you or your family notice any trouble with your hearing that hasn't been managed with hearing aids?: No  Do you have difficulty driving, watching TV, or doing any of your daily activities because of your eyesight?: No  Have you had an eye exam within the past year?: (!) No  No results found. Hearing/Vision Interventions:  Vision concerns:  patient declines any further evaluation/treatment for this issue    Safety:  Do you have working smoke detectors?: Yes  Do you have any tripping hazards - loose or unsecured carpets or rugs?: No  Do you have any tripping hazards - clutter in doorways, halls, or stairs?: No  Do you have either shower bars, grab bars, non-slip mats or non-slip surfaces in your shower or bathtub?: (!) No  Do all of your stairways have a railing or banister?: Yes  Do you always fasten your seatbelt when you are in a car?: Yes  Safety Interventions:  Home safety tips provided           Objective   Vitals:    12/02/22 1403   BP: 130/84   Pulse: 82   Temp: 97.9 °F (36.6 °C)   SpO2: 98%   Weight: 236 lb (107 kg)   Height: 5' 5\" (1.651 m)      Body mass index is 39.27 kg/m². Allergies   Allergen Reactions    Mobic [Meloxicam]      Patient was hospitalized after taking this medication. Affected liver/  Caused bleeding     Prior to Visit Medications    Medication Sig Taking?  Authorizing Provider   calcium carbonate (TUMS) 500 MG chewable tablet Take 1 tablet by mouth daily Yes Historical Provider, MD   fluticasone (FLONASE) 50 MCG/ACT nasal spray 1-2 sprays by Nasal route daily as needed for Rhinitis or Allergies Yes ADONAY Sánchez NP   cetirizine (ZYRTEC) 10 MG tablet Take 1 tablet by mouth nightly as needed for Allergies or Rhinitis Yes ADONAY Sánchez NP   budesonide-formoterol (SYMBICORT) 80-4.5 MCG/ACT AERO Inhale 2 puffs into the lungs 2 times daily Yes ADONAY Sánchez NP   aspirin (ASPIRIN LOW DOSE) 81 MG EC tablet take 1 tablet by mouth once daily Yes ADONAY Sánchez NP   amLODIPine (NORVASC) 10 MG tablet take 1 tablet by mouth once daily Yes ADONAY Sánchez NP   albuterol sulfate HFA (PROAIR HFA) 108 (90 Base) MCG/ACT inhaler Inhale 2 puffs into the lungs every 4 hours as needed for Wheezing or Shortness of Breath Yes ADONAY Sánchez NP   albuterol (PROVENTIL) (2.5 MG/3ML) 0.083% nebulizer solution Take 3 mLs by nebulization every 6 hours as needed for Wheezing or Shortness of Breath Yes ADONAY Hurd CNP       CareTeam (Including outside providers/suppliers regularly involved in providing care):   Patient Care Team:  ADONAY Sánchez NP as PCP - General (Nurse Practitioner)  ADONAY Sánchez NP as PCP - [facility] Empaneled Provider  Geovanny Casper MD as Consulting Physician (Pulmonology)  Vishnu Del Valle MD as Consulting Physician (Gastroenterology)     Reviewed and updated this visit:  Tobacco  Allergies  Meds  Med Hx  Surg Hx  Soc Hx  Fam Hx        NASEEM Templeton

## 2022-12-02 NOTE — PROGRESS NOTES
Luca Kim, APRN-CNP  [address] [facility]  [address] [address], [address]  [address] [address]  Dept: [phone]  Dept Fax: [phone]     PATIENT ID: Niranjan Liriano is a 79 y.o. male. HPI:  Established pt here today for f/u on chronic medical problems; HTN, COPD, asthma, allergies, hyperglycemia,, go over labs and/or diagnostic studies, and medication refills. Pt denies any fever or chills. Pt today denies any HA, chest pain, or SOB. Pt denies any N/V/D/C or abdominal pain. Pt states asthma is stable on inhalers. Pt states COPD is stable on inhalers. Pt stable on CPAP with his sleep apnea and has been using nightly. Pt states his allergies are stable on meds. He complains of an ongoing left inguinal hernia that he wants to have fixed. It is starting to become more painful. He did see Dr. Lee Yanez in 2021 but did not follow up. He does want to be seen by a surgeon closer to his house. Today, he complains of cough and congestion. Symptoms have been ongoing for about 5 days. He has been taking over the counter medication without significant improvement. He also complains of worsening indigestion. He has been taking TUMS without significant relief. My previous office notes, labs and diagnostic studies were reviewed prior to and during encounter. The patient's past medical, surgical, social, and family history as well as current medications and allergies were reviewed as documented in today's encounter by ENMA Cantu. Current Outpatient Medications on File Prior to Visit   Medication Sig Dispense Refill    calcium carbonate (TUMS) 500 MG chewable tablet Take 1 tablet by mouth daily       No current facility-administered medications on file prior to visit. SUBJECTIVE:     Review of Systems   Constitutional:  Negative for activity change, fatigue and fever. HENT:  Negative for congestion, ear pain, rhinorrhea and sore throat. Respiratory:  Positive for apnea (stable on CPAP). Negative for cough, chest tightness and shortness of breath. COPD (stable on Symbicort 80-4.5 BID & Albuterol PRN)   Cardiovascular:  Negative for chest pain and palpitations. Gastrointestinal:  Negative for abdominal distention, abdominal pain, constipation, diarrhea, nausea and vomiting. C/o worsening heartburn; unrelieved with TUMS   Endocrine: Negative for polydipsia, polyphagia and polyuria. Genitourinary:  Negative for difficulty urinating and dysuria. Left inguinal hernia   Musculoskeletal:  Negative for arthralgias, back pain and myalgias. Skin:  Negative for rash. Allergic/Immunologic: Positive for environmental allergies (stable on Zyrtec 10 mg daily). Neurological:  Negative for dizziness, weakness, light-headedness and headaches. Hematological:  Negative for adenopathy. Psychiatric/Behavioral:  Negative for agitation and behavioral problems. The patient is not nervous/anxious. OBJECTIVE:  /84   Pulse 82   Temp 97.9 °F (36.6 °C)   Ht 5' 5\" (1.651 m)   Wt 236 lb (107 kg)   SpO2 98%   BMI 39.27 kg/m²     Physical Exam  Vitals and nursing note reviewed. Constitutional:       General: He is not in acute distress. Appearance: Normal appearance. He is well-developed. HENT:      Head: Normocephalic and atraumatic. Cardiovascular:      Rate and Rhythm: Normal rate and regular rhythm. Heart sounds: Normal heart sounds. No murmur heard. Pulmonary:      Effort: Pulmonary effort is normal. No respiratory distress. Breath sounds: Examination of the right-lower field reveals wheezing. Examination of the left-lower field reveals wheezing. Wheezing (expiratory) and rhonchi (clears with cough) present. Chest:      Chest wall: No tenderness. Abdominal:      General: Bowel sounds are normal.      Palpations: Abdomen is soft. Tenderness: There is no abdominal tenderness.    Musculoskeletal: General: Normal range of motion. Cervical back: Normal range of motion. Right lower leg: No edema. Left lower leg: No edema. Skin:     General: Skin is warm and dry. Findings: No rash. Neurological:      Mental Status: He is alert and oriented to person, place, and time. Psychiatric:         Mood and Affect: Mood normal.         Behavior: Behavior is cooperative. ASSESSMENT:   Diagnosis Orders   1. Essential hypertension  CBC    Comprehensive Metabolic Panel    Lipid Panel    aspirin (ASPIRIN LOW DOSE) 81 MG EC tablet    amLODIPine (NORVASC) 10 MG tablet      2. Chronic obstructive pulmonary disease, unspecified COPD type ([facility])  predniSONE (DELTASONE) 20 MG tablet    azithromycin (ZITHROMAX) 250 MG tablet    fluticasone (FLONASE) 50 MCG/ACT nasal spray    albuterol sulfate HFA (PROAIR HFA) 108 (90 Base) MCG/ACT inhaler      3. Mild intermittent asthma without complication  budesonide-formoterol (SYMBICORT) 80-4.5 MCG/ACT AERO      4. Hyperglycemia  Hemoglobin A1C      5. Left ventricular diastolic dysfunction        6. OLIVIA on CPAP        7. Seasonal allergic rhinitis, unspecified trigger        8. Left inguinal hernia  AFL - Hever Reis MD, General Surgery, [place]      9. Medicare annual wellness visit, subsequent        10. Chronic seasonal allergic rhinitis  fluticasone (FLONASE) 50 MCG/ACT nasal spray    cetirizine (ZYRTEC) 10 MG tablet      11. SOB (shortness of breath)  albuterol (PROVENTIL) (2.5 MG/3ML) 0.083% nebulizer solution      12. Mild intermittent asthmatic bronchitis without complication  albuterol (PROVENTIL) (2.5 MG/3ML) 0.083% nebulizer solution      13. Bronchitis          PLAN:  1. Essential hypertension  - Stable: Medication re-filled as needed, con't medications as prescribed, con't current tx plan  - BP noted today; BP: 130/84    - Continue Amlodipine 10 mg daily as previously prescribed. - Continue with ASA 81 mg daily as previously prescribed.     - He does relate that he did follow up with Dr. Jemima Ascencio for cardiac clearance. Per patient, he underwent a stress test, which was normal.   - He has not followed up since. I did recommend calling to schedule follow up    2. Chronic obstructive pulmonary disease, unspecified COPD type ([facility] [address]  3. Mild intermittent asthma without complication  - Stable: Medication re-filled as needed, con't medications as prescribed, con't current tx plan  - Continue with Symbicort 80-4.5 BID as previously prescribed. - Continue with Albuterol as previously prescribed. - Avoid triggers that may exacerbate symptoms. 4. Hyperglycemia  - He did not get labs prior to today's visit  - Will reorder and call with results  - Advised to decrease, fats, carbohydrates, and engage in a healthier diet overall  - Encouraged 40 minutes of aerobic exercise 3-4 times/week. 5. Left ventricular diastolic dysfunction  - Stable: Medication re-filled as needed, con't medications as prescribed, con't current tx plan  - Will cont to follow with Dr Jemima Ascencio as instructed  - Last echo 2/2021 with EF of 55 %  - Low NA diet  - Patient instructed to call if CP, SOB, increased weight gain, increased edema or new onset congested cough. Patient verbalizes understanding.  - Discussed monitoring weights closely and to call for any weight gain of >3 lbs in 24 hours/5 lbs in 7 days  - Cont with increase exercise and aim for 150 minutes of aerobic activity per week    6. OLIVIA on CPAP  - Stable: Medication re-filled as needed, con't medications as prescribed, con't current tx plan   - Sleep much improved with CPAP  - Pt is consistent with wearing her CPAP at night.  - Discussion had regarding the importance of close follow-up is indicated to assist the patient with adaptation to CPAP and insure compliance and effectiveness of treatment. - The patient was be encouraged to sleep in the lateral decubitus position to minimize episodes of OLIVIA.   - The patient was encouraged to

## 2022-12-02 NOTE — PATIENT INSTRUCTIONS
Personalized Preventive Plan for Berto Ewing - 12/2/2022  Medicare offers a range of preventive health benefits. Some of the tests and screenings are paid in full while other may be subject to a deductible, co-insurance, and/or copay. Some of these benefits include a comprehensive review of your medical history including lifestyle, illnesses that may run in your family, and various assessments and screenings as appropriate. After reviewing your medical record and screening and assessments performed today your provider may have ordered immunizations, labs, imaging, and/or referrals for you. A list of these orders (if applicable) as well as your Preventive Care list are included within your After Visit Summary for your review. Other Preventive Recommendations:    A preventive eye exam performed by an eye specialist is recommended every 1-2 years to screen for glaucoma; cataracts, macular degeneration, and other eye disorders. A preventive dental visit is recommended every 6 months. Try to get at least 150 minutes of exercise per week or 10,000 steps per day on a pedometer . Order or download the FREE \"Exercise & Physical Activity: Your Everyday Guide\" from [employer]. Call 1-320.856.5082 or search [employer] online. You need 5182-9812 mg of calcium and 5793-8293 IU of vitamin D per day. It is possible to meet your calcium requirement with diet alone, but a vitamin D supplement is usually necessary to meet this goal.  When exposed to the sun, use a sunscreen that protects against both UVA and UVB radiation with an SPF of 30 or greater. Reapply every 2 to 3 hours or after sweating, drying off with a towel, or swimming. Always wear a seat belt when traveling in a car. Always wear a helmet when riding a bicycle or motorcycle. Heart-Healthy Diet   Sodium, Fat, and Cholesterol Controlled Diet       What Is a Heart Healthy Diet?    A heart-healthy diet is one that limits sodium , certain types of fat , and cholesterol . This type of diet is recommended for:   People with any form of cardiovascular disease (eg, coronary heart disease , peripheral vascular disease , previous heart attack , previous stroke )   People with risk factors for cardiovascular disease, such as high blood pressure , high cholesterol , or diabetes   Anyone who wants to lower their risk of developing cardiovascular disease   Sodium    Sodium is a mineral found in many foods. In general, most people consume much more sodium than they need. Diets high in sodium can increase blood pressure and lead to edema (water retention). On a heart-healthy diet, you should consume no more than 2,300 mg (milligrams) of sodium per dayabout the amount in one teaspoon of table salt. The foods highest in sodium include table salt (about 50% sodium), processed foods, convenience foods, and preserved foods. Cholesterol    Cholesterol is a fat-like, waxy substance in your blood. Our bodies make some cholesterol. It is also found in animal products, with the highest amounts in fatty meat, egg yolks, whole milk, cheese, shellfish, and organ meats. On a heart-healthy diet, you should limit your cholesterol intake to less than 200 mg per day. It is normal and important to have some cholesterol in your bloodstream. But too much cholesterol can cause plaque to build up within your arteries, which can eventually lead to a heart attack or stroke. The two types of cholesterol that are most commonly referred to are:   Low-density lipoprotein (LDL) cholesterol  Also known as bad cholesterol, this is the cholesterol that tends to build up along your arteries. Bad cholesterol levels are increased by eating fats that are saturated or hydrogenated. Optimal level of this cholesterol is less than 100. Over 130 starts to get risky for heart disease.    High-density lipoprotein (HDL) cholesterol  Also known as good cholesterol, this type of cholesterol actually carries cholesterol away from your arteries and may, therefore, help lower your risk of having a heart attack. You want this level to be high (ideally greater than 60). It is a risk to have a level less than 40. You can raise this good cholesterol by eating olive oil, canola oil, avocados, or nuts. Exercise raises this level, too. Fat    Fat is calorie dense and packs a lot of calories into a small amount of food. Even though fats should be limited due to their high calorie content, not all fats are bad. In fact, some fats are quite healthful. Fat can be broken down into four main types. The good-for-you fats are:   Monounsaturated fat  found in oils such as olive and canola, avocados, and nuts and natural nut butters; can decrease cholesterol levels, while keeping levels of HDL cholesterol high   Polyunsaturated fat  found in oils such as safflower, sunflower, soybean, corn, and sesame; can decrease total cholesterol and LDL cholesterol   Omega-3 fatty acids  particularly those found in fatty fish (such as salmon, trout, tuna, mackerel, herring, and sardines); can decrease risk of arrhythmias, decrease triglyceride levels, and slightly lower blood pressure   The fats that you want to limit are:   Saturated fat  found in animal products, many fast foods, and a few vegetables; increases total blood cholesterol, including LDL levels   Animal fats that are saturated include: butter, lard, whole-milk dairy products, meat fat, and poultry skin   Vegetable fats that are saturated include: hydrogenated shortening, palm oil, coconut oil, cocoa butter   Hydrogenated or trans fat  found in margarine and vegetable shortening, most shelf stable snack foods, and fried foods; increases LDL and decreases HDL     It is generally recommended that you limit your total fat for the day to less than 30% of your total calories.  If you follow an 1800-calorie heart healthy diet, for example, this would mean 60 grams of fat or less per day. Saturated fat and trans fat in your diet raises your blood cholesterol the most, much more than dietary cholesterol does. For this reason, on a heart-healthy diet, less than 7% of your calories should come from saturated fat and ideally 0% from trans fat. On an 1800-calorie diet, this translates into less than 14 grams of saturated fat per day, leaving 46 grams of fat to come from mono- and polyunsaturated fats.    Food Choices on a Heart Healthy Diet   Food Category   Foods Recommended   Foods to Avoid   Grains   Breads and rolls without salted tops Most dry and cooked cereals Unsalted crackers and breadsticks Low-sodium or homemade breadcrumbs or stuffing All rice and pastas   Breads, rolls, and crackers with salted tops High-fat baked goods (eg, muffins, donuts, pastries) Quick breads, self-rising flour, and biscuit mixes Regular bread crumbs Instant hot cereals Commercially prepared rice, pasta, or stuffing mixes   Vegetables   Most fresh, frozen, and low-sodium canned vegetables Low-sodium and salt-free vegetable juices Canned vegetables if unsalted or rinsed   Regular canned vegetables and juices, including sauerkraut and pickled vegetables Frozen vegetables with sauces Commercially prepared potato and vegetable mixes   Fruits   Most fresh, frozen, and canned fruits All fruit juices   Fruits processed with salt or sodium   Milk   Nonfat or low-fat (1%) milk Nonfat or low-fat yogurt Cottage cheese, low-fat ricotta, cheeses labeled as low-fat and low-sodium   Whole milk Reduced-fat (2%) milk Malted and chocolate milk Full fat yogurt Most cheeses (unless low-fat and low salt) Buttermilk (no more than 1 cup per week)   Meats and Beans   Lean cuts of fresh or frozen beef, veal, lamb, or pork (look for the word loin) Fresh or frozen poultry without the skin Fresh or frozen fish and some shellfish Egg whites and egg substitutes (Limit whole eggs to three per week) Tofu Nuts or seeds (unsalted, dry-roasted), low-sodium peanut butter Dried peas, beans, and lentils   Any smoked, cured, salted, or canned meat, fish, or poultry (including fuentes, chipped beef, cold cuts, hot dogs, sausages, sardines, and anchovies) Poultry skins Breaded and/or fried fish or meats Canned peas, beans, and lentils Salted nuts   Fats and Oils   Olive oil and canola oil Low-sodium, low-fat salad dressings and mayonnaise   Butter, margarine, coconut and palm oils, fuentes fat   Snacks, Sweets, and Condiments   Low-sodium or unsalted versions of broths, soups, soy sauce, and condiments Pepper, herbs, and spices; vinegar, lemon, or lime juice Low-fat frozen desserts (yogurt, sherbet, fruit bars) Sugar, cocoa powder, honey, syrup, jam, and preserves Low-fat, trans-fat free cookies, cakes, and pies Feng and animal crackers, fig bars, anton snaps   High-fat desserts Broth, soups, gravies, and sauces, made from instant mixes or other high-sodium ingredients Salted snack foods Canned olives Meat tenderizers, seasoning salt, and most flavored vinegars   Beverages   Low-sodium carbonated beverages Tea and coffee in moderation Soy milk   Commercially softened water   Suggestions   Make whole grains, fruits, and vegetables the base of your diet. Choose heart-healthy fats such as canola, olive, and flaxseed oil, and foods high in heart-healthy fats, such as nuts, seeds, soybeans, tofu, and fish. Eat fish at least twice per week; the fish highest in omega-3 fatty acids and lowest in mercury include salmon, herring, mackerel, sardines, and canned chunk light tuna. If you eat fish less than twice per week or have high triglycerides, talk to your doctor about taking fish oil supplements. Read food labels. For products low in fat and cholesterol, look for fat free, low-fat, cholesterol free, saturated fat free, and trans fat freeAlso scan the Nutrition Facts Label, which lists saturated fat, trans fat, and cholesterol amounts. For products low in sodium, look for sodium free, very low sodium, low sodium, no added salt, and unsalted   Skip the salt when cooking or at the table; if food needs more flavor, get creative and try out different herbs and spices. Garlic and onion also add substantial flavor to foods. Trim any visible fat off meat and poultry before cooking, and drain the fat off after wang. Use cooking methods that require little or no added fat, such as grilling, boiling, baking, poaching, broiling, roasting, steaming, stir-frying, and sauting. Avoid fast food and convenience food. They tend to be high in saturated and trans fat and have a lot of added salt. Talk to a registered dietitian for individualized diet advice. Last Reviewed: March 2011 Wyatt Rivera MS, MPH, RD   Updated: 3/29/2011       Preventing Osteoporosis: After Your Visit  Your Care Instructions  Osteoporosis means the bones are weak and thin enough that they can break easily. The older you are, the more likely you are to get osteoporosis. But with plenty of calcium, vitamin D, and exercise, you can help prevent osteoporosis. The preteen and teen years are a key time for bone building. With the help of calcium, vitamin D, and exercise in those early years and beyond, the bones reach their peak density and strength by age 27. After age 27, your bones naturally start to thin and weaken. The stronger your bones are at around age 27, the lower your risk for osteoporosis. But no matter what your age and risk are, your bones still need calcium, vitamin D, and exercise to stay strong. Also avoid smoking, and limit alcohol. Smoking and heavy alcohol use can make your bones thinner. Talk to your doctor about any special risks you might have, such as having a close relative with osteoporosis or taking a medicine that can weaken bones. Your doctor can tell you the best ways to protect your bones from thinning.   Follow-up care is a key part of your treatment and safety. Be sure to make and go to all appointments, and call your doctor if you are having problems. It's also a good idea to know your test results and keep a list of the medicines you take. How can you care for yourself at home? Get enough calcium and vitamin D. The [employer] recommends adults younger than age 46 need 1,000 mg of calcium and 600 IU of vitamin D each day. Women ages 46 to 79 need 1,200 mg of calcium and 600 IU of vitamin D each day. Men ages 46 to 79 need 1,000 mg of calcium and 600 IU of vitamin D each day. Adults 71 and older need 1,200 mg of calcium and 800 IU of vitamin D each day. Eat foods rich in calcium, like yogurt, cheese, milk, and dark green vegetables. Eat foods rich in vitamin D, like eggs, fatty fish, cereal, and fortified milk. Get some sunshine. Your body uses sunshine to make its own vitamin D. The safest time to be out in the sun is before 10 a.m. or after 3 p.m. Avoid getting sunburned. Sunburn can increase your risk of skin cancer. Talk to your doctor about taking a calcium plus vitamin D supplement. Ask about what type of calcium is right for you, and how much to take at a time. Adults ages 23 to 48 should not get more than 2,500 mg of calcium and 4,000 IU of vitamin D each day, whether it is from supplements and/or food. Adults ages 46 and older should not get more than 2,000 mg of calcium and 4,000 IU of vitamin D each day from supplements and/or food. Get regular bone-building exercise. Weight-bearing and resistance exercises keep bones healthy by working the muscles and bones against gravity. Start out at an exercise level that feels right for you. Add a little at a time until you can do the following:  Do 30 minutes of weight-bearing exercise on most days of the week. Walking, jogging, stair climbing, and dancing are good choices. Do resistance exercises with weights or elastic bands 2 to 3 days a week. Limit alcohol.  Drink no more than 1 alcohol drink a day if you are a woman. Drink no more than 2 alcohol drinks a day if you are a man. Do not smoke. Smoking can make bones thin faster. If you need help quitting, talk to your doctor about stop-smoking programs and medicines. These can increase your chances of quitting for good. When should you call for help? Watch closely for changes in your health, and be sure to contact your doctor if:  You need help with a healthy eating plan. You need help with an exercise plan    © [id number] [employer]. Care instructions adapted under license by [facility]. This care instruction is for use with your licensed healthcare professional. If you have questions about a medical condition or this instruction, always ask your healthcare professional. [address] any warranty or liability for your use of this information. Content Version: 9.4.71184; Last Revised: June 20, 2011                Keep Your Memory Ely Alvarado       Many factors can affect your ability to remembera hectic lifestyle, aging, stress, chronic disease, and certain medicines. But, there are steps you can take to sharpen your mind and help preserve your memory. Challenge Your Brain   Regularly challenging your mind may help keeps it in top shape. Good mental exercises include:   Crossword puzzlesUse a dictionary if you need it; you will learn more that way. Brainteasers Try some! Crafts, such as wood working and sewing   Hobbies, such as gardening and building model airplanes   SocializingVisit old friends or join groups to meet new ones.    Reading   Learning a new language   Taking a class, whether it be art history or yesenia chi   TravelingExperience the food, history, and culture of your destination   Learning to use a computer   Going to museums, the theater, or thought-provoking movies   Changing things in your daily life, such as reversing your pattern in the grocery store or brushing your teeth using your nondominant hand   Use Memory Aids   There is no need to remember every detail on your own. These memory aids can help:   Calendars and day planners   Electronic organizers to store all sorts of helpful informationThese devices can \"beep\" to remind you of appointments. A book of days to record birthdays, anniversaries, and other occasions that occur on the same date every year   Detailed \"to-do\" lists and strategically placed sticky notes   Quick \"study\" sessionsBefore a gathering, review who will be there so their names will be fresh in your mind. Establish routinesFor example, keep your keys, wallet, and umbrella in the same place all the time or take medicine with your 8:00 AM glass of juice   Live a Healthy Life   Many actions that will keep your body strong will do the same for your mind. For example:   Talk to Your Doctor About Herbs and Supplements    Malnutrition and vitamin deficiencies can impair your mental function. For example, vitamin B12 deficiency can cause a range of symptoms, including confusion. But, what if your nutritional needs are being met? Can herbs and supplements still offer a benefit? Researchers have investigated a range of natural remedies, such as ginkgo , ginseng , and the supplement phosphatidylserine (PS). So far, though, the evidence is inconsistent as to whether these products can improve memory or thinking. If you are interested in taking herbs and supplements, talk to your doctor first because they may interact with other medicines that you are taking. Exercise Regularly    Among the many benefits of regular exercise are increased blood flow to the brain and decreased risk of certain diseases that can interfere with memory function. One study found that even moderate exercise has a beneficial effect.  Examples of \"moderate\" exercise include:   Playing 18 holes of golf once a week, without a cart   Playing tennis twice a week   Walking one mile per day Manage Stress    It can be tough to remember what is important when your mind is cluttered. Make time for relaxation. Choose activities that calm you down, and make it routine. Manage Chronic Conditions    Side effects of high blood pressure , diabetes, and heart disease can interfere with mental function. Many of the lifestyle steps discussed here can help manage these conditions. Strive to eat a healthy diet, exercise regularly, get stress under control, and follow your doctor's advice for your condition. Minimize Medications    Talk to your doctor about the medicines that you take. Some may be unnecessary. Also, healthy lifestyle habits may lower the need for certain drugs. Last Reviewed: April 2010 Rajesh Rossi MD   Updated: 4/13/2010     Keeping Home a [facility]       As we get older, changes in balance, gait, strength, vision, hearing, and cognition make even the most youthful senior more prone to accidents. Falls are one of the leading health risks for older people. This increased risk of falling is related to:   Aging process (eg, decreased muscle strength, slowed reflexes)   Higher incidence of chronic health problems (eg, arthritis, diabetes) that may limit mobility, agility or sensory awareness   Side effects of medicine (eg, dizziness, blurred vision)especially medicines like prescription pain medicines and drugs used to treat mental health conditions   Depending on the brittleness of your bones, the consequences of a fall can be serious and long lasting. Home Life   Research by the Association of Aging [facility]) shows that some home accidents among older adults can be prevented by making simple lifestyle changes and basic modifications and repairs to the home environment. Here are some lifestyle changes that experts recommend:   Have your hearing and vision checked regularly. Be sure to wear prescription glasses that are right for you.    Speak to your doctor or pharmacist about the possible side effects of your medicines. A number of medicines can cause dizziness. If you have problems with sleep, talk to your doctor. Limit your intake of alcohol. If necessary, use a cane or walker to help maintain your balance. Wear supportive, rubber-soled shoes, even at home. If you live in a region that gets wintry weather, you may want to put special cleats on your shoes to prevent you from slipping on the snow and ice. Exercise regularly to help maintain muscle tone, agility, and balance. Always hold the banister when going up or down stairs. Also, use  bars when getting in or out of the bath or shower, or using the toilet. To avoid dizziness, get up slowly from a lying down position. Sit up first, dangling your legs for a minute or two before rising to a standing position. Overall Home Safety Check   According to the Consumer [employer] Safety Commision's \"Older Consumer Home Safety Checklist,\" it is important to check for potential hazards in each room. And remember, proper lighting is an essential factor in home safety. If you cannot see clearly, you are more likely to fall. Important questions to ask yourself include:   Are lamp, electric, extension, and telephone cords placed out of the flow of traffic and maintained in good condition? Have frayed cords been replaced? Are all small rugs and runners slip resistant? If not, you can secure them to the floor with a special double-sided carpet tape. Are smoke detectors properly locatedone on every floor of your home and one outside of every sleeping area? Are they in good working order? Are batteries replaced at least once a year? Do you have a well-maintained carbon monoxide detector outside every sleeping are in your home? Does your furniture layout leave plenty of space to maneuver between and around chairs, tables, beds, and sofas? Are hallways, stairs and passages between rooms well lit?  Can you reach a lamp without getting out of bed?   Are floor surfaces well maintained? Shag rugs, high-pile carpeting, tile floors, and polished wood floors can be particularly slippery. Stairs should always have handrails and be carpeted or fitted with a non-skid tread. Is your telephone easily reachable. Is the cord safely tucked away? Room by Room   According to the Association of Aging, bathrooms and regina are the two most potentially hazardous rooms in your home. In the Kitchen    Be sure your stove is in proper working order and always make sure burners and the oven are off before you go out or go to sleep. Keep pots on the back burners, turn handles away from the front of the stove, and keep stove clean and free of grease build-up. Kitchen ventilation systems and range exhausts should be working properly. Keep flammable objects such as towels and pot holders away from the cooking area except when in use. Make sure kitchen curtains are tied back. Move cords and appliances away from the sink and hot surfaces. If extension cords are needed, install wiring guides so they do not hang over the sink, range, or working areas. Look for coffee pots, kettles and toaster ovens with automatic shut-offs. Keep a mop handy in the kitchen so you can wipe up spills instantly. You should also have a small fire extinguisher. Arrange your kitchen with frequently used items on lower shelves to avoid the need to stand on a stepstool to reach them. Make sure countertops are well-lit to avoid injuries while cutting and preparing food. In the Bathroom    Use a non-slip mat or decals in the tub and shower, since wet, soapy tile or porcelain surfaces are extremely slippery. Make sure bathroom rugs are non-skid or tape them firmly to the floor. Bathtubs should have at least one, preferably two, grab bars, firmly attached to structural supports in the wall.  (Do not use built-in soap holders or glass shower doors as grab bars.)    Tub seats fitted with non-slip material on the legs allow you to wash sitting down. For people with limited mobility, bathtub transfer benches allow you to slide safely into the tub. Raised toilet seats and toilet safety rails are helpful for those with knee or hip problems. In the [facility]    Make sure you use a nightlight and that the area around your bed is clear of potential obstacles. Be careful with electric blankets and never go to sleep with a heating pad, which can cause serious burns even if on a low setting. Use fire-resistant mattress covers and pillows, and NEVER smoke in bed. Keep a phone next to the bed that is programmed to dial 911 at the push of a button. If you have a chronic condition, you may want to sign on with an automatic call-in service. Typically the system includes a small pendant that connects directly to an emergency medical voice-response system. You should also make arrangements to stay in contact with someonefriend, neighbor, family memberon a regular schedule. Fire Prevention   According to the [employer]. (Smoke Alarms for Every) [address] [place] [address], senior citizens are one of the two highest risk groups for death and serious injuries due to residential fires. When cooking, wear short-sleeved items, never a bulky long-sleeved robe. The [facility]'s Safety Checklist for Older Consumers emphasizes the importance of checking basements, garages, workshops and storage areas for fire hazards, such as volatile liquids, piles of old rags or clothing and overloaded circuits. Never smoke in bed or when lying down on a couch or recliner chair. Small portable electric or kerosene heaters are responsible for many home fires and should be used cautiously if at all. If you do use one, be sure to keep them away from flammable materials. In case of fire, make sure you have a pre-established emergency exit plan.     Have a professional check your fireplace and other fuel-burning appliances yearly. Helping Hands   Baby boomers entering the weber years will continue to see the development of new products to help older adults live safely and independently in spite of age-related changes. Making Life More Livable  , by [employer], lists over 1,000 products for \"living well in the mature years,\" such as bathing and mobility aids, household security devices, ergonomically designed knives and peelers, and faucet valves and knobs for temperature control. Medical supply stores and organizations are good sources of information about products that improve your quality of life and insure your safety.      Last Reviewed: November 2009 Marino Robbins MD   Updated: 3/7/2011

## 2023-04-07 ENCOUNTER — TELEMEDICINE (OUTPATIENT)
Dept: FAMILY MEDICINE CLINIC | Age: 68
End: 2023-04-07
Payer: COMMERCIAL

## 2023-04-07 DIAGNOSIS — I10 ESSENTIAL HYPERTENSION: ICD-10-CM

## 2023-04-07 DIAGNOSIS — J45.20 MILD INTERMITTENT ASTHMATIC BRONCHITIS WITHOUT COMPLICATION: Primary | ICD-10-CM

## 2023-04-07 DIAGNOSIS — J44.9 CHRONIC OBSTRUCTIVE PULMONARY DISEASE, UNSPECIFIED COPD TYPE (HCC): ICD-10-CM

## 2023-04-07 DIAGNOSIS — K21.9 GASTROESOPHAGEAL REFLUX DISEASE WITHOUT ESOPHAGITIS: ICD-10-CM

## 2023-04-07 PROCEDURE — 99214 OFFICE O/P EST MOD 30 MIN: CPT | Performed by: NURSE PRACTITIONER

## 2023-04-07 PROCEDURE — 1123F ACP DISCUSS/DSCN MKR DOCD: CPT | Performed by: NURSE PRACTITIONER

## 2023-04-07 RX ORDER — PANTOPRAZOLE SODIUM 40 MG/1
40 TABLET, DELAYED RELEASE ORAL
Qty: 90 TABLET | Refills: 3 | Status: SHIPPED | OUTPATIENT
Start: 2023-04-07

## 2023-04-07 RX ORDER — PREDNISONE 20 MG/1
20 TABLET ORAL DAILY
Qty: 5 TABLET | Refills: 0 | Status: SHIPPED | OUTPATIENT
Start: 2023-04-07 | End: 2023-04-12

## 2023-04-07 RX ORDER — AZITHROMYCIN 250 MG/1
250 TABLET, FILM COATED ORAL SEE ADMIN INSTRUCTIONS
Qty: 6 TABLET | Refills: 0 | Status: SHIPPED | OUTPATIENT
Start: 2023-04-07 | End: 2023-04-12

## 2023-04-07 RX ORDER — CALCIUM CARBONATE 200(500)MG
1 TABLET,CHEWABLE ORAL DAILY
Qty: 90 TABLET | Refills: 1 | Status: SHIPPED | OUTPATIENT
Start: 2023-04-07

## 2023-04-07 RX ORDER — FLUTICASONE PROPIONATE 50 MCG
1-2 SPRAY, SUSPENSION (ML) NASAL DAILY PRN
Qty: 3 EACH | Refills: 5 | Status: SHIPPED | OUTPATIENT
Start: 2023-04-07

## 2023-04-07 RX ORDER — ALBUTEROL SULFATE 90 UG/1
2 AEROSOL, METERED RESPIRATORY (INHALATION) EVERY 4 HOURS PRN
Qty: 3 EACH | Refills: 3 | Status: SHIPPED | OUTPATIENT
Start: 2023-04-07

## 2023-04-07 RX ORDER — ALBUTEROL SULFATE 2.5 MG/3ML
2.5 SOLUTION RESPIRATORY (INHALATION) EVERY 6 HOURS PRN
Qty: 120 EACH | Refills: 3 | Status: SHIPPED | OUTPATIENT
Start: 2023-04-07

## 2023-04-07 RX ORDER — BUDESONIDE AND FORMOTEROL FUMARATE DIHYDRATE 80; 4.5 UG/1; UG/1
2 AEROSOL RESPIRATORY (INHALATION) 2 TIMES DAILY
Qty: 3 EACH | Refills: 5 | Status: SHIPPED | OUTPATIENT
Start: 2023-04-07

## 2023-04-07 RX ORDER — AMLODIPINE BESYLATE 10 MG/1
TABLET ORAL
Qty: 90 TABLET | Refills: 3 | Status: SHIPPED | OUTPATIENT
Start: 2023-04-07

## 2023-04-07 RX ORDER — ASPIRIN 81 MG/1
TABLET ORAL
Qty: 90 TABLET | Refills: 3 | Status: SHIPPED | OUTPATIENT
Start: 2023-04-07

## 2023-04-07 SDOH — ECONOMIC STABILITY: INCOME INSECURITY: HOW HARD IS IT FOR YOU TO PAY FOR THE VERY BASICS LIKE FOOD, HOUSING, MEDICAL CARE, AND HEATING?: NOT HARD AT ALL

## 2023-04-07 SDOH — ECONOMIC STABILITY: FOOD INSECURITY: WITHIN THE PAST 12 MONTHS, YOU WORRIED THAT YOUR FOOD WOULD RUN OUT BEFORE YOU GOT MONEY TO BUY MORE.: NEVER TRUE

## 2023-04-07 SDOH — ECONOMIC STABILITY: HOUSING INSECURITY
IN THE LAST 12 MONTHS, WAS THERE A TIME WHEN YOU DID NOT HAVE A STEADY PLACE TO SLEEP OR SLEPT IN A SHELTER (INCLUDING NOW)?: NO

## 2023-04-07 SDOH — ECONOMIC STABILITY: FOOD INSECURITY: WITHIN THE PAST 12 MONTHS, THE FOOD YOU BOUGHT JUST DIDN'T LAST AND YOU DIDN'T HAVE MONEY TO GET MORE.: NEVER TRUE

## 2023-04-07 ASSESSMENT — ENCOUNTER SYMPTOMS
DIARRHEA: 0
CONSTIPATION: 0
COUGH: 1
SORE THROAT: 0
SINUS PRESSURE: 1
CHEST TIGHTNESS: 0
SHORTNESS OF BREATH: 1
COLOR CHANGE: 0
ABDOMINAL DISTENTION: 0
NAUSEA: 0
RHINORRHEA: 1
SINUS PAIN: 1
BACK PAIN: 0
ABDOMINAL PAIN: 0

## 2023-04-07 NOTE — PROGRESS NOTES
albuterol sulfate HFA (PROAIR HFA) 108 (90 Base) MCG/ACT inhaler    albuterol (PROVENTIL) (2.5 MG/3ML) 0.083% nebulizer solution      4. Essential hypertension  aspirin (ASPIRIN LOW DOSE) 81 MG EC tablet    amLODIPine (NORVASC) 10 MG tablet        Plan:     Mild intermittent asthmatic bronchitis without complication  - start azithromycin and prednisone as prescribed. - encouraged to use Flonase daily.  -Increase fluids, rest, call the office if s/s do not improve within 7 days or worsen at anytime, thorough hand washing techniques recommended, patient verbalized understanding  -For additional symptom relief, I suggest the following over-the-counter remedies: For fevers or pain: acetaminophen (Tylenol) or ibuprofen (Advil, Motrin) or naproxen (Aleve)   For congestion or sinus pressure: medications containing guaifenesin to help break up mucus, such as Mucinex or Robitussin, medications containing pseudoephedrine or phenylephrine, such as Sudafed and nasal steroid sprays, such as Flonase, Sensimist, Rhinocort or Nasonex  For runny nose, sneezing or watery/itchy eyes: less sedating antihistamines, such as loratidine (Claritin), fexofenadine (Allegra) or Cetirizine (Zyrtec)     Gastroesophageal reflux disease without esophagitis  - Stable: Medication re-filled as needed, con't medications as prescribed, con't current tx plan  - Continue Protonix daily and Tums as needed as previously prescribed  - Patient educated on avoiding trigger food/drinks such as caffeine, soda, chocolate, greasy/fatty, spicy foods.  - Sleep with head of bed elevated, avoid lying flat after eating and avoid restrictive clothing around waist.    Chronic obstructive pulmonary disease, unspecified COPD type ([facility] [address]  - Stable: Medication re-filled as needed, con't medications as prescribed, con't current tx plan  - Continue with Symbicort and albuterol as previously prescribed.      Essential hypertension  - Stable: Medication re-filled as needed,

## 2023-05-19 ENCOUNTER — HOSPITAL ENCOUNTER (OUTPATIENT)
Dept: PREADMISSION TESTING | Age: 68
End: 2023-05-19
Payer: COMMERCIAL

## 2023-05-19 VITALS
BODY MASS INDEX: 38.32 KG/M2 | HEIGHT: 65 IN | HEART RATE: 59 BPM | SYSTOLIC BLOOD PRESSURE: 144 MMHG | TEMPERATURE: 97.8 F | DIASTOLIC BLOOD PRESSURE: 86 MMHG | RESPIRATION RATE: 18 BRPM | OXYGEN SATURATION: 97 % | WEIGHT: 230 LBS

## 2023-05-19 LAB
ANION GAP SERPL CALCULATED.3IONS-SCNC: 12 MMOL/L (ref 9–17)
BASOPHILS # BLD: 0.1 K/UL (ref 0–0.2)
BASOPHILS NFR BLD: 1 % (ref 0–2)
BUN SERPL-MCNC: 12 MG/DL (ref 8–23)
CALCIUM SERPL-MCNC: 9.5 MG/DL (ref 8.6–10.4)
CHLORIDE SERPL-SCNC: 105 MMOL/L (ref 98–107)
CO2 SERPL-SCNC: 24 MMOL/L (ref 20–31)
CREAT SERPL-MCNC: 0.9 MG/DL (ref 0.7–1.2)
EOSINOPHIL # BLD: 0.2 K/UL (ref 0–0.4)
EOSINOPHILS RELATIVE PERCENT: 3 % (ref 0–4)
ERYTHROCYTE [DISTWIDTH] IN BLOOD BY AUTOMATED COUNT: 12.5 % (ref 11.5–14.9)
GFR SERPL CREATININE-BSD FRML MDRD: >60 ML/MIN/1.73M2
GLUCOSE SERPL-MCNC: 129 MG/DL (ref 70–99)
HCT VFR BLD AUTO: 43.6 % (ref 41–53)
HGB BLD-MCNC: 14.4 G/DL (ref 13.5–17.5)
LYMPHOCYTES # BLD: 41 % (ref 24–44)
LYMPHOCYTES NFR BLD: 2.9 K/UL (ref 1–4.8)
MCH RBC QN AUTO: 33.4 PG (ref 26–34)
MCHC RBC AUTO-ENTMCNC: 33 G/DL (ref 31–37)
MCV RBC AUTO: 101.3 FL (ref 80–100)
MONOCYTES NFR BLD: 0.6 K/UL (ref 0.1–1.3)
MONOCYTES NFR BLD: 9 % (ref 1–7)
NEUTROPHILS NFR BLD: 46 % (ref 36–66)
NEUTS SEG NFR BLD: 3.4 K/UL (ref 1.3–9.1)
PLATELET # BLD AUTO: 312 K/UL (ref 150–450)
PMV BLD AUTO: 8.2 FL (ref 6–12)
POTASSIUM SERPL-SCNC: 4.4 MMOL/L (ref 3.7–5.3)
RBC # BLD AUTO: 4.31 M/UL (ref 4.5–5.9)
SODIUM SERPL-SCNC: 141 MMOL/L (ref 135–144)
WBC OTHER # BLD: 7.2 K/UL (ref 3.5–11)

## 2023-05-19 PROCEDURE — 93005 ELECTROCARDIOGRAM TRACING: CPT | Performed by: SURGERY

## 2023-05-19 PROCEDURE — 85025 COMPLETE CBC W/AUTO DIFF WBC: CPT

## 2023-05-19 PROCEDURE — 36415 COLL VENOUS BLD VENIPUNCTURE: CPT

## 2023-05-19 PROCEDURE — 80048 BASIC METABOLIC PNL TOTAL CA: CPT

## 2023-05-19 NOTE — DISCHARGE INSTRUCTIONS
assessment will be performed by a nurse practitioner or house officer. Your IV will be started and you will meet your anesthesiologist.    When you go to surgery, your family will be directed to the surgical waiting room, where the doctor should speak with them after your surgery. After surgery, you will be taken to the recovery room then when you are awake and stable you will go to the short stay unit for preparation to be discharged. If you use a Bi-PAP or C-PAP machine, please bring it with you and leave it in the car in case it is needed in recovery room.

## 2023-05-19 NOTE — PROGRESS NOTES
Dr. Linda Hooker, anesthesia, was contacted and informed of patient's history and planned surgery. Orders received and no clearance required.

## 2023-05-22 LAB
EKG ATRIAL RATE: 62 BPM
EKG P AXIS: 13 DEGREES
EKG P-R INTERVAL: 186 MS
EKG Q-T INTERVAL: 402 MS
EKG QRS DURATION: 92 MS
EKG QTC CALCULATION (BAZETT): 408 MS
EKG R AXIS: -31 DEGREES
EKG T AXIS: -15 DEGREES
EKG VENTRICULAR RATE: 62 BPM

## 2023-05-26 ENCOUNTER — ANESTHESIA EVENT (OUTPATIENT)
Dept: OPERATING ROOM | Age: 68
End: 2023-05-26
Payer: COMMERCIAL

## 2023-05-30 ENCOUNTER — ANESTHESIA (OUTPATIENT)
Dept: OPERATING ROOM | Age: 68
End: 2023-05-30
Payer: COMMERCIAL

## 2023-05-30 ENCOUNTER — HOSPITAL ENCOUNTER (OUTPATIENT)
Age: 68
Setting detail: OUTPATIENT SURGERY
Discharge: HOME | End: 2023-05-30
Attending: SURGERY | Admitting: SURGERY
Payer: COMMERCIAL

## 2023-05-30 VITALS
RESPIRATION RATE: 16 BRPM | HEART RATE: 80 BPM | OXYGEN SATURATION: 94 % | TEMPERATURE: 97.7 F | BODY MASS INDEX: 38.34 KG/M2 | WEIGHT: 230.13 LBS | SYSTOLIC BLOOD PRESSURE: 145 MMHG | HEIGHT: 65 IN | DIASTOLIC BLOOD PRESSURE: 92 MMHG

## 2023-05-30 DIAGNOSIS — K40.90 LEFT INGUINAL HERNIA: Primary | ICD-10-CM

## 2023-05-30 DIAGNOSIS — K40.90 UNILATERAL INGUINAL HERNIA WITHOUT OBSTRUCTION OR GANGRENE, RECURRENCE NOT SPECIFIED: ICD-10-CM

## 2023-05-30 PROCEDURE — 6370000000 HC RX 637 (ALT 250 FOR IP): Performed by: ANESTHESIOLOGY

## 2023-05-30 PROCEDURE — 3700000000 HC ANESTHESIA ATTENDED CARE: Performed by: SURGERY

## 2023-05-30 PROCEDURE — 7100000000 HC PACU RECOVERY - FIRST 15 MIN: Performed by: SURGERY

## 2023-05-30 PROCEDURE — 3600000002 HC SURGERY LEVEL 2 BASE: Performed by: SURGERY

## 2023-05-30 PROCEDURE — 2580000003 HC RX 258: Performed by: ANESTHESIOLOGY

## 2023-05-30 PROCEDURE — 2500000003 HC RX 250 WO HCPCS: Performed by: SURGERY

## 2023-05-30 PROCEDURE — 7100000031 HC ASPR PHASE II RECOVERY - ADDTL 15 MIN: Performed by: SURGERY

## 2023-05-30 PROCEDURE — 3600000012 HC SURGERY LEVEL 2 ADDTL 15MIN: Performed by: SURGERY

## 2023-05-30 PROCEDURE — 6360000002 HC RX W HCPCS: Performed by: NURSE ANESTHETIST, CERTIFIED REGISTERED

## 2023-05-30 PROCEDURE — 2720000010 HC SURG SUPPLY STERILE: Performed by: SURGERY

## 2023-05-30 PROCEDURE — 6360000002 HC RX W HCPCS: Performed by: ANESTHESIOLOGY

## 2023-05-30 PROCEDURE — C1781 MESH (IMPLANTABLE): HCPCS | Performed by: SURGERY

## 2023-05-30 PROCEDURE — 2500000003 HC RX 250 WO HCPCS: Performed by: NURSE ANESTHETIST, CERTIFIED REGISTERED

## 2023-05-30 PROCEDURE — 88302 TISSUE EXAM BY PATHOLOGIST: CPT

## 2023-05-30 PROCEDURE — 3700000001 HC ADD 15 MINUTES (ANESTHESIA): Performed by: SURGERY

## 2023-05-30 PROCEDURE — 7100000011 HC PHASE II RECOVERY - ADDTL 15 MIN: Performed by: SURGERY

## 2023-05-30 PROCEDURE — 2709999900 HC NON-CHARGEABLE SUPPLY: Performed by: SURGERY

## 2023-05-30 PROCEDURE — 7100000010 HC PHASE II RECOVERY - FIRST 15 MIN: Performed by: SURGERY

## 2023-05-30 PROCEDURE — 7100000030 HC ASPR PHASE II RECOVERY - FIRST 15 MIN: Performed by: SURGERY

## 2023-05-30 PROCEDURE — 6360000002 HC RX W HCPCS: Performed by: SURGERY

## 2023-05-30 PROCEDURE — 7100000001 HC PACU RECOVERY - ADDTL 15 MIN: Performed by: SURGERY

## 2023-05-30 DEVICE — PLUG HERN XL W1.6XL2IN INGUINAL POLYPR REP PRESHAPED ONLAY: Type: IMPLANTABLE DEVICE | Site: GROIN | Status: FUNCTIONAL

## 2023-05-30 RX ORDER — DIPHENHYDRAMINE HYDROCHLORIDE 50 MG/ML
12.5 INJECTION INTRAMUSCULAR; INTRAVENOUS
Status: DISCONTINUED | OUTPATIENT
Start: 2023-05-30 | End: 2023-05-30 | Stop reason: HOSPADM

## 2023-05-30 RX ORDER — GABAPENTIN 100 MG/1
100 CAPSULE ORAL ONCE
Status: COMPLETED | OUTPATIENT
Start: 2023-05-30 | End: 2023-05-30

## 2023-05-30 RX ORDER — LIDOCAINE HYDROCHLORIDE 10 MG/ML
1 INJECTION, SOLUTION EPIDURAL; INFILTRATION; INTRACAUDAL; PERINEURAL
Status: DISCONTINUED | OUTPATIENT
Start: 2023-05-30 | End: 2023-05-30 | Stop reason: HOSPADM

## 2023-05-30 RX ORDER — SODIUM CHLORIDE 0.9 % (FLUSH) 0.9 %
5-40 SYRINGE (ML) INJECTION PRN
Status: DISCONTINUED | OUTPATIENT
Start: 2023-05-30 | End: 2023-05-30 | Stop reason: HOSPADM

## 2023-05-30 RX ORDER — SODIUM CHLORIDE, SODIUM LACTATE, POTASSIUM CHLORIDE, CALCIUM CHLORIDE 600; 310; 30; 20 MG/100ML; MG/100ML; MG/100ML; MG/100ML
INJECTION, SOLUTION INTRAVENOUS CONTINUOUS
Status: DISCONTINUED | OUTPATIENT
Start: 2023-05-30 | End: 2023-05-30 | Stop reason: HOSPADM

## 2023-05-30 RX ORDER — HYDROCODONE BITARTRATE AND ACETAMINOPHEN 5; 325 MG/1; MG/1
1 TABLET ORAL EVERY 6 HOURS PRN
Status: COMPLETED | OUTPATIENT
Start: 2023-05-30 | End: 2023-05-30

## 2023-05-30 RX ORDER — CEPHALEXIN 500 MG/1
CAPSULE ORAL
Qty: 21 CAPSULE | Refills: 0 | Status: SHIPPED | OUTPATIENT
Start: 2023-05-30

## 2023-05-30 RX ORDER — LABETALOL HYDROCHLORIDE 5 MG/ML
10 INJECTION, SOLUTION INTRAVENOUS
Status: DISCONTINUED | OUTPATIENT
Start: 2023-05-30 | End: 2023-05-30 | Stop reason: HOSPADM

## 2023-05-30 RX ORDER — FENTANYL CITRATE 50 UG/ML
INJECTION, SOLUTION INTRAMUSCULAR; INTRAVENOUS PRN
Status: DISCONTINUED | OUTPATIENT
Start: 2023-05-30 | End: 2023-05-30 | Stop reason: SDUPTHER

## 2023-05-30 RX ORDER — ONDANSETRON 4 MG/1
TABLET, FILM COATED ORAL
Qty: 20 TABLET | Refills: 0 | Status: SHIPPED | OUTPATIENT
Start: 2023-05-30

## 2023-05-30 RX ORDER — SODIUM CHLORIDE 0.9 % (FLUSH) 0.9 %
5-40 SYRINGE (ML) INJECTION EVERY 12 HOURS SCHEDULED
Status: DISCONTINUED | OUTPATIENT
Start: 2023-05-30 | End: 2023-05-30 | Stop reason: HOSPADM

## 2023-05-30 RX ORDER — LIDOCAINE HYDROCHLORIDE 10 MG/ML
INJECTION, SOLUTION EPIDURAL; INFILTRATION; INTRACAUDAL; PERINEURAL PRN
Status: DISCONTINUED | OUTPATIENT
Start: 2023-05-30 | End: 2023-05-30 | Stop reason: SDUPTHER

## 2023-05-30 RX ORDER — SODIUM CHLORIDE 9 MG/ML
25 INJECTION, SOLUTION INTRAVENOUS PRN
Status: DISCONTINUED | OUTPATIENT
Start: 2023-05-30 | End: 2023-05-30 | Stop reason: HOSPADM

## 2023-05-30 RX ORDER — MIDAZOLAM HYDROCHLORIDE 1 MG/ML
INJECTION INTRAMUSCULAR; INTRAVENOUS PRN
Status: DISCONTINUED | OUTPATIENT
Start: 2023-05-30 | End: 2023-05-30 | Stop reason: SDUPTHER

## 2023-05-30 RX ORDER — ONDANSETRON 2 MG/ML
4 INJECTION INTRAMUSCULAR; INTRAVENOUS
Status: DISCONTINUED | OUTPATIENT
Start: 2023-05-30 | End: 2023-05-30 | Stop reason: HOSPADM

## 2023-05-30 RX ORDER — ACETAMINOPHEN 500 MG
1000 TABLET ORAL ONCE
Status: COMPLETED | OUTPATIENT
Start: 2023-05-30 | End: 2023-05-30

## 2023-05-30 RX ORDER — PROPOFOL 10 MG/ML
INJECTION, EMULSION INTRAVENOUS PRN
Status: DISCONTINUED | OUTPATIENT
Start: 2023-05-30 | End: 2023-05-30 | Stop reason: SDUPTHER

## 2023-05-30 RX ORDER — ONDANSETRON 2 MG/ML
INJECTION INTRAMUSCULAR; INTRAVENOUS PRN
Status: DISCONTINUED | OUTPATIENT
Start: 2023-05-30 | End: 2023-05-30 | Stop reason: SDUPTHER

## 2023-05-30 RX ORDER — BUPIVACAINE HYDROCHLORIDE 5 MG/ML
INJECTION, SOLUTION EPIDURAL; INTRACAUDAL PRN
Status: DISCONTINUED | OUTPATIENT
Start: 2023-05-30 | End: 2023-05-30 | Stop reason: ALTCHOICE

## 2023-05-30 RX ORDER — SODIUM CHLORIDE 9 MG/ML
INJECTION, SOLUTION INTRAVENOUS PRN
Status: DISCONTINUED | OUTPATIENT
Start: 2023-05-30 | End: 2023-05-30 | Stop reason: HOSPADM

## 2023-05-30 RX ORDER — METOCLOPRAMIDE HYDROCHLORIDE 5 MG/ML
10 INJECTION INTRAMUSCULAR; INTRAVENOUS
Status: DISCONTINUED | OUTPATIENT
Start: 2023-05-30 | End: 2023-05-30 | Stop reason: HOSPADM

## 2023-05-30 RX ORDER — FENTANYL CITRATE 0.05 MG/ML
25 INJECTION, SOLUTION INTRAMUSCULAR; INTRAVENOUS EVERY 5 MIN PRN
Status: DISCONTINUED | OUTPATIENT
Start: 2023-05-30 | End: 2023-05-30 | Stop reason: HOSPADM

## 2023-05-30 RX ORDER — OXYCODONE HYDROCHLORIDE AND ACETAMINOPHEN 5; 325 MG/1; MG/1
1 TABLET ORAL EVERY 6 HOURS PRN
Qty: 28 TABLET | Refills: 0 | Status: SHIPPED | OUTPATIENT
Start: 2023-05-30 | End: 2023-06-06

## 2023-05-30 RX ORDER — HYDRALAZINE HYDROCHLORIDE 20 MG/ML
10 INJECTION INTRAMUSCULAR; INTRAVENOUS
Status: DISCONTINUED | OUTPATIENT
Start: 2023-05-30 | End: 2023-05-30 | Stop reason: HOSPADM

## 2023-05-30 RX ORDER — DEXAMETHASONE SODIUM PHOSPHATE 4 MG/ML
INJECTION, SOLUTION INTRA-ARTICULAR; INTRALESIONAL; INTRAMUSCULAR; INTRAVENOUS; SOFT TISSUE PRN
Status: DISCONTINUED | OUTPATIENT
Start: 2023-05-30 | End: 2023-05-30 | Stop reason: SDUPTHER

## 2023-05-30 RX ORDER — ROCURONIUM BROMIDE 10 MG/ML
INJECTION, SOLUTION INTRAVENOUS PRN
Status: DISCONTINUED | OUTPATIENT
Start: 2023-05-30 | End: 2023-05-30 | Stop reason: SDUPTHER

## 2023-05-30 RX ADMIN — SUGAMMADEX 200 MG: 100 INJECTION, SOLUTION INTRAVENOUS at 15:50

## 2023-05-30 RX ADMIN — GABAPENTIN 100 MG: 100 CAPSULE ORAL at 13:04

## 2023-05-30 RX ADMIN — DEXAMETHASONE SODIUM PHOSPHATE 4 MG: 4 INJECTION, SOLUTION INTRAMUSCULAR; INTRAVENOUS at 14:46

## 2023-05-30 RX ADMIN — Medication 2000 MG: at 14:21

## 2023-05-30 RX ADMIN — PROPOFOL 200 MG: 10 INJECTION, EMULSION INTRAVENOUS at 14:10

## 2023-05-30 RX ADMIN — ACETAMINOPHEN 1000 MG: 500 TABLET ORAL at 13:04

## 2023-05-30 RX ADMIN — SODIUM CHLORIDE, POTASSIUM CHLORIDE, SODIUM LACTATE AND CALCIUM CHLORIDE: 600; 310; 30; 20 INJECTION, SOLUTION INTRAVENOUS at 15:57

## 2023-05-30 RX ADMIN — FENTANYL CITRATE 100 MCG: 50 INJECTION, SOLUTION INTRAMUSCULAR; INTRAVENOUS at 14:10

## 2023-05-30 RX ADMIN — MIDAZOLAM 2 MG: 1 INJECTION INTRAMUSCULAR; INTRAVENOUS at 14:10

## 2023-05-30 RX ADMIN — FENTANYL CITRATE 50 MCG: 50 INJECTION, SOLUTION INTRAMUSCULAR; INTRAVENOUS at 14:25

## 2023-05-30 RX ADMIN — HYDROCODONE BITARTRATE AND ACETAMINOPHEN 1 TABLET: 5; 325 TABLET ORAL at 17:38

## 2023-05-30 RX ADMIN — ROCURONIUM 20 MG: 50 INJECTION, SOLUTION INTRAVENOUS at 15:06

## 2023-05-30 RX ADMIN — ROCURONIUM 50 MG: 50 INJECTION, SOLUTION INTRAVENOUS at 14:10

## 2023-05-30 RX ADMIN — ONDANSETRON 4 MG: 2 INJECTION INTRAMUSCULAR; INTRAVENOUS at 14:46

## 2023-05-30 RX ADMIN — HYDROMORPHONE HYDROCHLORIDE 0.5 MG: 1 INJECTION, SOLUTION INTRAMUSCULAR; INTRAVENOUS; SUBCUTANEOUS at 16:26

## 2023-05-30 RX ADMIN — LIDOCAINE HYDROCHLORIDE 50 MG: 10 INJECTION, SOLUTION EPIDURAL; INFILTRATION; INTRACAUDAL; PERINEURAL at 14:10

## 2023-05-30 RX ADMIN — SODIUM CHLORIDE, POTASSIUM CHLORIDE, SODIUM LACTATE AND CALCIUM CHLORIDE: 600; 310; 30; 20 INJECTION, SOLUTION INTRAVENOUS at 13:10

## 2023-05-30 RX ADMIN — HYDROMORPHONE HYDROCHLORIDE 0.5 MG: 1 INJECTION, SOLUTION INTRAMUSCULAR; INTRAVENOUS; SUBCUTANEOUS at 16:36

## 2023-05-30 ASSESSMENT — PAIN SCALES - GENERAL
PAINLEVEL_OUTOF10: 10
PAINLEVEL_OUTOF10: 10
PAINLEVEL_OUTOF10: 0
PAINLEVEL_OUTOF10: 10
PAINLEVEL_OUTOF10: 9
PAINLEVEL_OUTOF10: 6

## 2023-05-30 ASSESSMENT — PAIN DESCRIPTION - PAIN TYPE
TYPE: SURGICAL PAIN

## 2023-05-30 ASSESSMENT — PAIN DESCRIPTION - ORIENTATION
ORIENTATION: LEFT

## 2023-05-30 ASSESSMENT — ENCOUNTER SYMPTOMS: SHORTNESS OF BREATH: 0

## 2023-05-30 ASSESSMENT — PAIN - FUNCTIONAL ASSESSMENT: PAIN_FUNCTIONAL_ASSESSMENT: 0-10

## 2023-05-30 ASSESSMENT — PAIN DESCRIPTION - LOCATION
LOCATION: GROIN
LOCATION: ABDOMEN;GROIN
LOCATION: GROIN

## 2023-05-30 ASSESSMENT — PAIN DESCRIPTION - DESCRIPTORS
DESCRIPTORS: ACHING;DULL
DESCRIPTORS: ACHING;DULL

## 2023-05-30 ASSESSMENT — LIFESTYLE VARIABLES: SMOKING_STATUS: 0

## 2023-05-30 NOTE — DISCHARGE INSTRUCTIONS
DISCHARGE INSTRUCTIONS FOR GENERAL ANESTHESIA    In order to continue your care at home, please follow the instructions below. Anesthesia - Do not drink any alcoholic beverages or make any legal or important decisions for 24 hours. If your surgery was on an extremity or abdomen, do not drive or operate any machinery until your surgeon approves, otherwise do not drive or operate any machinery for 24 hours or as restricted by your surgeon. Pain Medications - Please do not drive, operate machinery, or drink alcoholic beverages while taking any prescribed pain medication. Diet -  Start out eating lightly (broth, soup, crackers, toast, etc.) advancing as tolerated to your usual diet. Try to avoid spicy and greasy/fatty foods for 24 hours. Drink plenty of fluids after surgery, unless you are on a fluid restriction. Avoid milk/milk product for several hours. Call your surgeon for the following: You have pain that does not get better after you take pain medicine. For an oral temperature (by mouth) is 101 degrees or higher, chills, or excessive sweating. You have increasing and progressive bleeding or drainage from surgery site. Signs of an infection:  increased swelling, redness, warmth, or hardness around surgery area or yellow or green drainage. Persistent nausea or vomiting and cant keep fluids down. If you are unable to urinate within 8 hours of surgery. Redness or swelling at IV site. For any questions or concerns you may have.             Dr. Sanford Prater Orders for Outpatient    1.) Diet:    [x]  As tolerated  []  Special     2.) Activity:    [x]  No lifting more than five to ten pounds 4-6 weeks  [x]  May Shower tomorrow  [x]  No driving until off pain medications     3.) Dressing:    [x]  Remove top dressing in 48 hours   [x]  Leave steri strips on     [x]  Remove and change dressing sooner if soaked    4.) Medication:    [x]  Take medication as prescribed   []  Resume blood thinners

## 2023-05-30 NOTE — H&P
Chronic neck pain 05/16/2017    Erectile dysfunction 10/07/2016    Hyperglycemia 04/19/2016    Vitamin D deficiency 04/19/2016    Essential hypertension 01/08/2015           Note marked for cosign by provider      ADONAY Negron CNP on 5/30/2023 at 7:26 AM

## 2023-05-30 NOTE — ANESTHESIA POSTPROCEDURE EVALUATION
Department of Anesthesiology  Postprocedure Note    Patient: Gilson Reilly  MRN: [id number]  [place]: 1955  Date of evaluation: 5/30/2023      Procedure Summary     Date: 05/30/23 Room / Location: [facility]: WILFREDO CHARLES    Anesthesia Start: 1406 Anesthesia Stop: 4442    Procedure: OPEN INGUINAL HERNIA REPAIR W/MESH (Left) Diagnosis:       Unilateral inguinal hernia without obstruction or gangrene, recurrence not specified      (LEFT INGUINAL HERNIA)    Surgeons: Ishmael Valentine MD Responsible Provider: Meriel Prader, MD    Anesthesia Type: general ASA Status: 3          Anesthesia Type: No value filed.     Neelam Phase I: Neelma Score: 10    Neelam Phase II:        Anesthesia Post Evaluation    Comments: POST- ANESTHESIA EVALUATION       Pt Name: Gilson Reilly  MRN: [id number]  YOB: 1955  Date of evaluation: 5/30/2023  Time:  5:19 PM      BP (!) 149/91   Pulse 73   Temp 97.7 °F (36.5 °C) (Infrared)   Resp 16   Ht 5' 5\" (1.651 m)   Wt 230 lb 2 oz (104.4 kg)   SpO2 94%   BMI 38.29 kg/m²      Consciousness Level  Awake  Cardiopulmonary Status  Stable  Pain Adequately Treated YES  Nausea / Vomiting  NO  Adequate Hydration  YES  Anesthesia Related Complications NONE      Electronically signed by Meriel Prader, MD on 5/30/2023 at 5:19 PM

## 2023-05-30 NOTE — ANESTHESIA PRE PROCEDURE
Department of Anesthesiology  Preprocedure Note       Name:  Gill Kitchen   Age:  76 y.o.  :  1955                                          MRN:  [id number]         Date:  2023      Surgeon: Gal Ramírez):  Jo Aguilar MD    Procedure: Procedure(s):  OPEN INGUINAL HERNIA REPAIR W/MESH    Medications prior to admission:   Prior to Admission medications    Medication Sig Start Date End Date Taking?  Authorizing Provider   calcium carbonate (TUMS) 500 MG chewable tablet Take 1 tablet by mouth daily 23   ADONAY Sabillon CNP   fluticasone [facility]) 50 MCG/ACT nasal spray 1-2 sprays by Nasal route daily as needed for Rhinitis or Allergies 23   ADONAY Sabillon CNP   budesonide-formoterol [facility]) 80-4.5 MCG/ACT AERO Inhale 2 puffs into the lungs 2 times daily 23   ADONAY Sabillon CNP   aspirin (ASPIRIN LOW DOSE) 81 MG EC tablet take 1 tablet by mouth once daily 23   ADONAY Sabillon CNP   amLODIPine (NORVASC) 10 MG tablet take 1 tablet by mouth once daily 23   ADONAY Sabillon CNP   albuterol sulfate HFA (PROAIR HFA) 108 (90 Base) MCG/ACT inhaler Inhale 2 puffs into the lungs every 4 hours as needed for Wheezing or Shortness of Breath 23   ADONAY Sabillon CNP   albuterol (PROVENTIL) (2.5 MG/3ML) 0.083% nebulizer solution Take 3 mLs by nebulization every 6 hours as needed for Wheezing or Shortness of Breath 23   ADONAY Sabillon CNP   pantoprazole (PROTONIX) 40 MG tablet Take 1 tablet by mouth every morning (before breakfast) 23   ADONAY Sabillon CNP       Current medications:    Current Facility-Administered Medications   Medication Dose Route Frequency Provider Last Rate Last Admin    ceFAZolin (ANCEF) 2000 mg in 0.9% sodium chloride 50 mL IVPB  2,000 mg IntraVENous Once Jo Aguilar MD        sodium chloride flush 0.9 % injection 5-40 mL  5-40 mL IntraVENous 2 times per day Simon Lechuga MD        sodium

## 2023-05-30 NOTE — BRIEF OP NOTE
Brief Postoperative Note      Patient: Taylor Ponce  YOB: 1955  MRN: [id number]    Date of Procedure: 5/30/2023    Pre-Op Diagnosis Codes:     * Unilateral inguinal hernia without obstruction or gangrene, recurrence not specified [K40.90]    Post-Op Diagnosis: Same       Procedure(s):  OPEN INGUINAL HERNIA REPAIR W/MESH    Surgeon(s):  Kenzie Boyce MD    Assistant:  * No surgical staff found *    Anesthesia: General    Estimated Blood Loss (mL): less than 50     Complications: None    Specimens:   * No specimens in log *    Implants:  * No implants in log *      Drains: * No LDAs found *    Findings: dictated      Electronically signed by Kenzie Boyce MD on 5/30/2023 at 2:09 PM

## 2023-06-01 LAB — SURGICAL PATHOLOGY REPORT: NORMAL

## 2023-11-17 ENCOUNTER — TELEPHONE (OUTPATIENT)
Dept: FAMILY MEDICINE CLINIC | Age: 68
End: 2023-11-17

## 2023-11-17 DIAGNOSIS — B96.89 ACUTE BACTERIAL SINUSITIS: Primary | ICD-10-CM

## 2023-11-17 DIAGNOSIS — J01.90 ACUTE BACTERIAL SINUSITIS: Primary | ICD-10-CM

## 2023-11-17 RX ORDER — AZITHROMYCIN 250 MG/1
250 TABLET, FILM COATED ORAL SEE ADMIN INSTRUCTIONS
Qty: 6 TABLET | Refills: 0 | Status: SHIPPED | OUTPATIENT
Start: 2023-11-17 | End: 2023-11-22

## 2023-11-17 NOTE — TELEPHONE ENCOUNTER
11/17/23-Wife called to get a z-pac called in for patient. She states symptoms x 3 days. Please call patient to advise

## 2024-02-27 ENCOUNTER — TELEPHONE (OUTPATIENT)
Dept: FAMILY MEDICINE CLINIC | Age: 69
End: 2024-02-27

## 2024-02-27 NOTE — TELEPHONE ENCOUNTER
Spoke with pt and he did not seem short of breath. He states he has a cough and typically has this and would like a zpak called in. Scheduled for VV.

## 2024-02-27 NOTE — TELEPHONE ENCOUNTER
Patient believes he has a URI. Symptoms started about 5-6 days ago.     Symptoms:   - dizzy    - headaches    - difficulty breathing   - shortness of breathing    Patient is requesting a Same Day appt.     Writer transferred the call to clinical for immediate assistance.

## 2024-04-23 DIAGNOSIS — K21.9 GASTROESOPHAGEAL REFLUX DISEASE WITHOUT ESOPHAGITIS: ICD-10-CM

## 2024-04-23 DIAGNOSIS — I10 ESSENTIAL HYPERTENSION: ICD-10-CM

## 2024-04-23 DIAGNOSIS — J44.9 CHRONIC OBSTRUCTIVE PULMONARY DISEASE, UNSPECIFIED COPD TYPE (HCC): ICD-10-CM

## 2024-04-23 RX ORDER — ALBUTEROL SULFATE 90 UG/1
AEROSOL, METERED RESPIRATORY (INHALATION)
Qty: 25.5 G | Refills: 3 | Status: SHIPPED | OUTPATIENT
Start: 2024-04-23

## 2024-04-23 RX ORDER — PANTOPRAZOLE SODIUM 40 MG/1
40 TABLET, DELAYED RELEASE ORAL
Qty: 90 TABLET | Refills: 3 | Status: SHIPPED | OUTPATIENT
Start: 2024-04-23

## 2024-04-23 RX ORDER — ASPIRIN 81 MG/1
TABLET ORAL
Qty: 30 TABLET | Refills: 0 | Status: SHIPPED | OUTPATIENT
Start: 2024-04-23 | End: 2024-05-14

## 2024-04-23 RX ORDER — FLUTICASONE PROPIONATE 50 MCG
SPRAY, SUSPENSION (ML) NASAL
Qty: 48 G | Refills: 3 | Status: SHIPPED | OUTPATIENT
Start: 2024-04-23

## 2024-04-23 RX ORDER — AMLODIPINE BESYLATE 10 MG/1
TABLET ORAL
Qty: 90 TABLET | Refills: 3 | Status: SHIPPED | OUTPATIENT
Start: 2024-04-23

## 2024-04-25 ENCOUNTER — OFFICE VISIT (OUTPATIENT)
Dept: FAMILY MEDICINE CLINIC | Age: 69
End: 2024-04-25
Payer: COMMERCIAL

## 2024-04-25 ENCOUNTER — TELEPHONE (OUTPATIENT)
Dept: FAMILY MEDICINE CLINIC | Age: 69
End: 2024-04-25

## 2024-04-25 VITALS
SYSTOLIC BLOOD PRESSURE: 130 MMHG | DIASTOLIC BLOOD PRESSURE: 88 MMHG | TEMPERATURE: 98.2 F | OXYGEN SATURATION: 98 % | WEIGHT: 235.2 LBS | BODY MASS INDEX: 39.14 KG/M2 | HEART RATE: 72 BPM

## 2024-04-25 DIAGNOSIS — R73.9 HYPERGLYCEMIA: ICD-10-CM

## 2024-04-25 DIAGNOSIS — I51.9 LEFT VENTRICULAR DIASTOLIC DYSFUNCTION: ICD-10-CM

## 2024-04-25 DIAGNOSIS — I10 ESSENTIAL HYPERTENSION: Primary | ICD-10-CM

## 2024-04-25 DIAGNOSIS — K21.9 GASTROESOPHAGEAL REFLUX DISEASE WITHOUT ESOPHAGITIS: ICD-10-CM

## 2024-04-25 DIAGNOSIS — J30.2 SEASONAL ALLERGIC RHINITIS, UNSPECIFIED TRIGGER: ICD-10-CM

## 2024-04-25 DIAGNOSIS — J45.20 MILD INTERMITTENT ASTHMATIC BRONCHITIS WITHOUT COMPLICATION: ICD-10-CM

## 2024-04-25 DIAGNOSIS — Z12.5 PROSTATE CANCER SCREENING: ICD-10-CM

## 2024-04-25 DIAGNOSIS — J44.9 CHRONIC OBSTRUCTIVE PULMONARY DISEASE, UNSPECIFIED COPD TYPE (HCC): ICD-10-CM

## 2024-04-25 DIAGNOSIS — G47.33 OSA ON CPAP: ICD-10-CM

## 2024-04-25 DIAGNOSIS — J40 BRONCHITIS: ICD-10-CM

## 2024-04-25 PROCEDURE — 99214 OFFICE O/P EST MOD 30 MIN: CPT | Performed by: NURSE PRACTITIONER

## 2024-04-25 PROCEDURE — 3075F SYST BP GE 130 - 139MM HG: CPT | Performed by: NURSE PRACTITIONER

## 2024-04-25 PROCEDURE — 3079F DIAST BP 80-89 MM HG: CPT | Performed by: NURSE PRACTITIONER

## 2024-04-25 PROCEDURE — 1123F ACP DISCUSS/DSCN MKR DOCD: CPT | Performed by: NURSE PRACTITIONER

## 2024-04-25 RX ORDER — PREDNISONE 20 MG/1
20 TABLET ORAL DAILY
Qty: 5 TABLET | Refills: 0 | Status: SHIPPED | OUTPATIENT
Start: 2024-04-25 | End: 2024-04-30

## 2024-04-25 RX ORDER — AZITHROMYCIN 250 MG/1
TABLET, FILM COATED ORAL
Qty: 6 TABLET | Refills: 0 | Status: SHIPPED | OUTPATIENT
Start: 2024-04-25 | End: 2024-05-05

## 2024-04-25 SDOH — ECONOMIC STABILITY: FOOD INSECURITY: WITHIN THE PAST 12 MONTHS, YOU WORRIED THAT YOUR FOOD WOULD RUN OUT BEFORE YOU GOT MONEY TO BUY MORE.: NEVER TRUE

## 2024-04-25 SDOH — ECONOMIC STABILITY: INCOME INSECURITY: HOW HARD IS IT FOR YOU TO PAY FOR THE VERY BASICS LIKE FOOD, HOUSING, MEDICAL CARE, AND HEATING?: NOT HARD AT ALL

## 2024-04-25 SDOH — ECONOMIC STABILITY: FOOD INSECURITY: WITHIN THE PAST 12 MONTHS, THE FOOD YOU BOUGHT JUST DIDN'T LAST AND YOU DIDN'T HAVE MONEY TO GET MORE.: NEVER TRUE

## 2024-04-25 ASSESSMENT — ENCOUNTER SYMPTOMS
COUGH: 1
ABDOMINAL DISTENTION: 0
ABDOMINAL PAIN: 0
NAUSEA: 0
SHORTNESS OF BREATH: 1
WHEEZING: 1
CONSTIPATION: 0
CHEST TIGHTNESS: 0
SORE THROAT: 0
DIARRHEA: 0
APNEA: 1
RHINORRHEA: 0
BACK PAIN: 0
VOMITING: 0

## 2024-04-25 NOTE — PROGRESS NOTES
Wt 106.7 kg (235 lb 3.2 oz)   SpO2 98%   BMI 39.14 kg/m²     Physical Exam  Vitals and nursing note reviewed.   Constitutional:       General: He is not in acute distress.     Appearance: Normal appearance. He is well-developed.   HENT:      Head: Normocephalic and atraumatic.   Cardiovascular:      Rate and Rhythm: Normal rate and regular rhythm.      Heart sounds: Normal heart sounds. No murmur heard.  Pulmonary:      Effort: Pulmonary effort is normal. No respiratory distress.      Breath sounds: Examination of the right-lower field reveals wheezing. Examination of the left-lower field reveals wheezing. Wheezing (faint expiratory wheezing) present. No rhonchi.   Chest:      Chest wall: No tenderness.   Abdominal:      General: Bowel sounds are normal.      Palpations: Abdomen is soft.      Tenderness: There is no abdominal tenderness.   Musculoskeletal:         General: Normal range of motion.      Cervical back: Normal range of motion.      Right lower leg: No edema.      Left lower leg: No edema.   Skin:     General: Skin is warm and dry.      Findings: No rash.   Neurological:      Mental Status: He is alert and oriented to person, place, and time.   Psychiatric:         Mood and Affect: Mood normal.         Behavior: Behavior is cooperative.       ASSESSMENT:   Diagnosis Orders   1. Essential hypertension  Lipid Panel    CBC    Comprehensive Metabolic Panel      2. Chronic obstructive pulmonary disease, unspecified COPD type (HCC)        3. Mild intermittent asthmatic bronchitis without complication        4. Hyperglycemia  Hemoglobin A1C      5. Left ventricular diastolic dysfunction        6. OLIVIA on CPAP        7. Seasonal allergic rhinitis, unspecified trigger        8. Left inguinal hernia        9. Gastroesophageal reflux disease without esophagitis        10. Bronchitis        11. Prostate cancer screening  PSA Screening        PLAN:  1. Essential hypertension  - Stable: Medication re-filled as

## 2024-04-25 NOTE — TELEPHONE ENCOUNTER
Patient was given a letter for a disability parking placard but it doesn't have an end date. Needs a new one faxed to the [facility], [phone]. New letter done and faxed.

## 2024-05-14 DIAGNOSIS — I10 ESSENTIAL HYPERTENSION: ICD-10-CM

## 2024-05-14 RX ORDER — ASPIRIN 81 MG/1
TABLET ORAL
Qty: 90 TABLET | Refills: 3 | Status: SHIPPED | OUTPATIENT
Start: 2024-05-14

## 2024-08-15 ENCOUNTER — TELEPHONE (OUTPATIENT)
Dept: FAMILY MEDICINE CLINIC | Age: 69
End: 2024-08-15

## 2024-08-15 NOTE — TELEPHONE ENCOUNTER
Patient's spouse called in stating that [employer] will be faxing over some paperwork for PCP to fill out.  Wife asked if she could get follow up phone call once paperwork is received please.      Samantha    PHONE: [phone]

## 2024-08-16 NOTE — TELEPHONE ENCOUNTER
Patient's spouse called into office wanting to follow up on [employer] paperwork that was faxed over.  Spouse asked if PCP has filled out yet and sent back.  Please advise.    Samantha    PHONE: [phone]

## 2024-08-16 NOTE — TELEPHONE ENCOUNTER
Called Samantha back and informed her we did fill out and will fax back now. She states understanding.

## 2024-10-15 ASSESSMENT — ENCOUNTER SYMPTOMS
SHORTNESS OF BREATH: 1
DIARRHEA: 0
BACK PAIN: 0
ABDOMINAL PAIN: 0
COUGH: 1
APNEA: 1
SORE THROAT: 0
CONSTIPATION: 0
ABDOMINAL DISTENTION: 0
CHEST TIGHTNESS: 0
WHEEZING: 1
NAUSEA: 0
RHINORRHEA: 0
VOMITING: 0

## 2024-10-16 ENCOUNTER — HOSPITAL ENCOUNTER (OUTPATIENT)
Age: 69
Setting detail: SPECIMEN
Discharge: HOME | End: 2024-10-16

## 2024-10-16 ENCOUNTER — OFFICE VISIT (OUTPATIENT)
Dept: FAMILY MEDICINE CLINIC | Age: 69
End: 2024-10-16
Payer: COMMERCIAL

## 2024-10-16 VITALS
TEMPERATURE: 97.5 F | HEART RATE: 71 BPM | SYSTOLIC BLOOD PRESSURE: 138 MMHG | HEIGHT: 65 IN | DIASTOLIC BLOOD PRESSURE: 84 MMHG | RESPIRATION RATE: 16 BRPM | WEIGHT: 228.4 LBS | OXYGEN SATURATION: 97 % | BODY MASS INDEX: 38.05 KG/M2

## 2024-10-16 DIAGNOSIS — I77.810 ASCENDING AORTA DILATATION (HCC): ICD-10-CM

## 2024-10-16 DIAGNOSIS — M25.512 ACUTE PAIN OF LEFT SHOULDER: ICD-10-CM

## 2024-10-16 DIAGNOSIS — R73.9 HYPERGLYCEMIA: ICD-10-CM

## 2024-10-16 DIAGNOSIS — R30.0 DYSURIA: ICD-10-CM

## 2024-10-16 DIAGNOSIS — I10 ESSENTIAL HYPERTENSION: ICD-10-CM

## 2024-10-16 DIAGNOSIS — K21.9 GASTROESOPHAGEAL REFLUX DISEASE WITHOUT ESOPHAGITIS: ICD-10-CM

## 2024-10-16 DIAGNOSIS — I51.9 LEFT VENTRICULAR DIASTOLIC DYSFUNCTION: ICD-10-CM

## 2024-10-16 DIAGNOSIS — Z12.5 PROSTATE CANCER SCREENING: ICD-10-CM

## 2024-10-16 DIAGNOSIS — J30.2 SEASONAL ALLERGIC RHINITIS, UNSPECIFIED TRIGGER: ICD-10-CM

## 2024-10-16 DIAGNOSIS — J44.9 CHRONIC OBSTRUCTIVE PULMONARY DISEASE, UNSPECIFIED COPD TYPE (HCC): ICD-10-CM

## 2024-10-16 DIAGNOSIS — J45.20 MILD INTERMITTENT ASTHMATIC BRONCHITIS WITHOUT COMPLICATION: ICD-10-CM

## 2024-10-16 DIAGNOSIS — G47.33 OSA ON CPAP: ICD-10-CM

## 2024-10-16 DIAGNOSIS — Z00.00 MEDICARE ANNUAL WELLNESS VISIT, SUBSEQUENT: Primary | ICD-10-CM

## 2024-10-16 LAB
ALBUMIN SERPL-MCNC: 4.3 G/DL (ref 3.5–5.2)
ALBUMIN/GLOB SERPL: 1 {RATIO} (ref 1–2.5)
ALP SERPL-CCNC: 57 U/L (ref 40–129)
ALT SERPL-CCNC: 16 U/L (ref 10–50)
ANION GAP SERPL CALCULATED.3IONS-SCNC: 9 MMOL/L (ref 9–16)
AST SERPL-CCNC: 27 U/L (ref 10–50)
BILIRUB SERPL-MCNC: 0.6 MG/DL (ref 0–1.2)
BUN SERPL-MCNC: 11 MG/DL (ref 8–23)
CALCIUM SERPL-MCNC: 9.8 MG/DL (ref 8.6–10.4)
CHLORIDE SERPL-SCNC: 104 MMOL/L (ref 98–107)
CHOLEST SERPL-MCNC: 161 MG/DL (ref 0–199)
CHOLESTEROL/HDL RATIO: 4
CO2 SERPL-SCNC: 25 MMOL/L (ref 20–31)
CREAT SERPL-MCNC: 1 MG/DL (ref 0.7–1.2)
ERYTHROCYTE [DISTWIDTH] IN BLOOD BY AUTOMATED COUNT: 11.6 % (ref 11.8–14.4)
GFR, ESTIMATED: 85 ML/MIN/1.73M2
GLUCOSE SERPL-MCNC: 98 MG/DL (ref 74–99)
HCT VFR BLD AUTO: 45.8 % (ref 40.7–50.3)
HDLC SERPL-MCNC: 44 MG/DL
HGB BLD-MCNC: 14.9 G/DL (ref 13–17)
LDLC SERPL CALC-MCNC: 95 MG/DL (ref 0–100)
MCH RBC QN AUTO: 33.1 PG (ref 25.2–33.5)
MCHC RBC AUTO-ENTMCNC: 32.5 G/DL (ref 28.4–34.8)
MCV RBC AUTO: 101.8 FL (ref 82.6–102.9)
NRBC BLD-RTO: 0 PER 100 WBC
PLATELET # BLD AUTO: 320 K/UL (ref 138–453)
PMV BLD AUTO: 10.1 FL (ref 8.1–13.5)
POTASSIUM SERPL-SCNC: 4.8 MMOL/L (ref 3.7–5.3)
PROT SERPL-MCNC: 7.7 G/DL (ref 6.6–8.7)
PSA SERPL-MCNC: 1.8 NG/ML (ref 0–4)
RBC # BLD AUTO: 4.5 M/UL (ref 4.21–5.77)
SODIUM SERPL-SCNC: 138 MMOL/L (ref 136–145)
TRIGL SERPL-MCNC: 107 MG/DL
VLDLC SERPL CALC-MCNC: 21 MG/DL
WBC OTHER # BLD: 6 K/UL (ref 3.5–11.3)

## 2024-10-16 PROCEDURE — G0439 PPPS, SUBSEQ VISIT: HCPCS | Performed by: NURSE PRACTITIONER

## 2024-10-16 PROCEDURE — 1123F ACP DISCUSS/DSCN MKR DOCD: CPT | Performed by: NURSE PRACTITIONER

## 2024-10-16 PROCEDURE — 99213 OFFICE O/P EST LOW 20 MIN: CPT | Performed by: NURSE PRACTITIONER

## 2024-10-16 PROCEDURE — 3075F SYST BP GE 130 - 139MM HG: CPT | Performed by: NURSE PRACTITIONER

## 2024-10-16 PROCEDURE — 3079F DIAST BP 80-89 MM HG: CPT | Performed by: NURSE PRACTITIONER

## 2024-10-16 RX ORDER — CEPHALEXIN 500 MG/1
500 CAPSULE ORAL 3 TIMES DAILY
Qty: 21 CAPSULE | Refills: 0 | Status: SHIPPED | OUTPATIENT
Start: 2024-10-16 | End: 2024-10-23

## 2024-10-16 RX ORDER — PREDNISONE 20 MG/1
TABLET ORAL
Qty: 17 TABLET | Refills: 0 | Status: SHIPPED | OUTPATIENT
Start: 2024-10-16 | End: 2024-10-26

## 2024-10-16 RX ORDER — FLUTICASONE PROPIONATE AND SALMETEROL 100; 50 UG/1; UG/1
1 POWDER RESPIRATORY (INHALATION) EVERY 12 HOURS
Qty: 3 EACH | Refills: 3 | Status: SHIPPED | OUTPATIENT
Start: 2024-10-16

## 2024-10-16 ASSESSMENT — PATIENT HEALTH QUESTIONNAIRE - PHQ9
SUM OF ALL RESPONSES TO PHQ QUESTIONS 1-9: 0
2. FEELING DOWN, DEPRESSED OR HOPELESS: NOT AT ALL
SUM OF ALL RESPONSES TO PHQ9 QUESTIONS 1 & 2: 0
SUM OF ALL RESPONSES TO PHQ QUESTIONS 1-9: 0
1. LITTLE INTEREST OR PLEASURE IN DOING THINGS: NOT AT ALL

## 2024-10-16 ASSESSMENT — LIFESTYLE VARIABLES
HOW OFTEN DO YOU HAVE A DRINK CONTAINING ALCOHOL: NEVER
HOW MANY STANDARD DRINKS CONTAINING ALCOHOL DO YOU HAVE ON A TYPICAL DAY: PATIENT DOES NOT DRINK
HOW MANY STANDARD DRINKS CONTAINING ALCOHOL DO YOU HAVE ON A TYPICAL DAY: PATIENT DOES NOT DRINK
HOW OFTEN DO YOU HAVE A DRINK CONTAINING ALCOHOL: NEVER

## 2024-10-16 NOTE — PROGRESS NOTES
Virginia Pittman, APRN-CNP  [facility]  [facility]  [address], [address]  [facility] [address]  Dept: [phone]  Dept Fax: [phone]     Medicare Annual Wellness Visit    Reta Flores is here for 6 Month Follow-Up (URI problems /Weight bench fell on pt about 4 days ago whole left side (left and shoulder) has been hurting since )    Assessment & Plan   1. Medicare annual wellness visit, subsequent  - Positive risk factors, screenings and interventions discussed  - Home safety tips provided  - Continue with annual medicare wellness visits  - Will cont with current treatment as pt is currently stable on current treatment  - Continue with all medications as previously prescribed.  - Will cont to follow with all specialists as previously as instructed        Recommendations for Preventive Services Due: see orders and patient instructions/AVS.  Recommended screening schedule for the next 5-10 years is provided to the patient in written form: see Patient Instructions/AVS.     No follow-ups on file.     Subjective       Patient's complete Health Risk Assessment and screening values have been reviewed and are found in Flowsheets. The following problems were reviewed today and where indicated follow up appointments were made and/or referrals ordered.    Positive Risk Factor Screenings with Interventions:                  Abnormal BMI (obese):  Body mass index is 38.01 kg/m². (!) Abnormal  Interventions:  See AVS for additional education material          Vision Screen:  Do you have difficulty driving, watching TV, or doing any of your daily activities because of your eyesight?: (!) Yes (Driving at night)  Have you had an eye exam within the past year?: Appointment is scheduled  Interventions:   Patient encouraged to make appointment with their eye specialist      Advanced Directives:  Do you have a Living Will?: (!) No    Intervention:  has NO advanced directive - 
previously prescribed  - Continue with Flonase as previously prescribed.      9. Gastroesophageal reflux disease without esophagitis  - Stable: Avoidance of trigger food/drinks such as caffeine, soda, chocolate, greasy/fatty, spicy foods have been recommended along with sleeping with head of bed elevated, avoid lying flat after eating and avoid restrictive clothing around waist.    - Continue Protonix 40 mg daily as previously prescribed     10. Acute pain of left shoulder  - Steroid taper dose  - Take prednisone w/food in the AM  - I did discuss side effects of Prednisone such as, but not limited to: GI distress,HTN, Hyperglycemia,Osteoporosis, and weight gain  - He has been instructed to avoid all other NSAIDS (advil, motrin, ibuprofen, aleve, naproxen)    - Consider X-ray studies if not improving.      11. Dysuria  - Unable to give urine in office today  - Will treat with Keflex 500 mg TID x 7 days    12. Prostate cancer screening  -  The recommendation for Prostate Cancer screening is beginning at the age of 50 years for average-risk men as long as life expectancy is at least 10 years  - He understands that although the PSA is prostate specific, it may also be elevated in those with ongoing benign conditions (eg, benign prostatic hyperplasia or prostatitis) and is not 100% indicative of prostate cancer.    - Will order PSA, as he is agreeable.      - Rest of systems unchanged, continue current treatments.    - On this date October 16, 2024,  I have spent greater than 50% of this visit reviewing previous notes, test results and/or face to face with the patient discussing the diagnoses, importance of compliance with the treatment plan, counseling, coordinating care as well as documenting on the day of the visit.     Electronically signed by ADONAY Prado NP on 10/16/2024 at 11:13 AM

## 2024-10-16 NOTE — PATIENT INSTRUCTIONS
Patient Education        Rotator Cuff: Exercises  Introduction  Here are some examples of exercises for you to try. The exercises may be suggested for a condition or for rehabilitation. Start each exercise slowly. Ease off the exercises if you start to have pain.  You will be told when to start these exercises and which ones will work best for you.  How to do the exercises  Pendulum swing    Hold on to a table or the back of a chair with your unaffected arm. Then bend forward a little and let your affected arm hang straight down. This exercise does not use the arm muscles. Rather, use your legs and your hips to create movement that makes your arm swing freely.  Use the movement from your hips and legs to guide the slightly swinging arm forward and backward like a pendulum (or elephant trunk). Then guide it in circles that start small (about the size of a dinner plate). Make the circles a bit larger each day, as your pain allows.  Do this exercise for at least 1 minute. Do it at least 3 times a day.  As you have less pain, try bending over a little farther to do this exercise. This will increase the amount of movement at your shoulder.  Shoulder stretch (posterior)    Relax your shoulders. Hold the elbow of your affected arm with your other hand.  Use your hand to pull your affected arm gently up and across your body. You will feel a gentle stretch across the back of your affected shoulder.  Hold for at least 15 to 30 seconds, then slowly lower your arm.  Repeat 2 to 4 times.  If you can, repeat these steps for your other shoulder.  Up-the-back shoulder stretch    Your doctor or physical therapist may advise you to wait to do this stretch until you have regained most of your range of motion and strength.  Stand or sit up straight. If you're standing, keep your feet about hip-width apart.  Hold any of these stretches for at least 15 to 30 seconds.  Repeat 2 to 4 times.  If you can, repeat these steps for your other

## 2024-11-13 ENCOUNTER — TELEPHONE (OUTPATIENT)
Dept: FAMILY MEDICINE CLINIC | Age: 69
End: 2024-11-13

## 2024-11-13 DIAGNOSIS — M25.552 LEFT HIP PAIN: ICD-10-CM

## 2024-11-13 DIAGNOSIS — M25.512 ACUTE PAIN OF LEFT SHOULDER: Primary | ICD-10-CM

## 2024-11-13 NOTE — TELEPHONE ENCOUNTER
Order placed for left shoulder and left hip xray can get done at any [facility] facility

## 2024-11-13 NOTE — TELEPHONE ENCOUNTER
Update. Patient was prescribed a steroid at his last appointment for this left hip and left shoulder pain. Patient stating that this steroid did not help at all.     Symptoms:    - throbbing pain in shoulder    - sharp pain in shoulder when he lifts his arm up    - throbbing pain in the hip    - random sharp hip pain      Please advise.

## 2024-11-14 ENCOUNTER — HOSPITAL ENCOUNTER (OUTPATIENT)
Dept: GENERAL RADIOLOGY | Age: 69
Discharge: HOME | End: 2024-11-16
Payer: COMMERCIAL

## 2024-11-14 ENCOUNTER — HOSPITAL ENCOUNTER (OUTPATIENT)
Age: 69
Discharge: HOME | End: 2024-11-16
Payer: COMMERCIAL

## 2024-11-14 DIAGNOSIS — M25.512 ACUTE PAIN OF LEFT SHOULDER: ICD-10-CM

## 2024-11-14 DIAGNOSIS — M25.552 LEFT HIP PAIN: ICD-10-CM

## 2024-11-14 PROCEDURE — 73030 X-RAY EXAM OF SHOULDER: CPT

## 2024-11-14 PROCEDURE — 73502 X-RAY EXAM HIP UNI 2-3 VIEWS: CPT

## 2024-11-25 ENCOUNTER — OFFICE VISIT (OUTPATIENT)
Dept: ORTHOPEDIC SURGERY | Age: 69
End: 2024-11-25
Payer: COMMERCIAL

## 2024-11-25 VITALS — RESPIRATION RATE: 14 BRPM | HEIGHT: 65 IN | WEIGHT: 220 LBS | BODY MASS INDEX: 36.65 KG/M2

## 2024-11-25 DIAGNOSIS — M25.512 LEFT SHOULDER PAIN, UNSPECIFIED CHRONICITY: ICD-10-CM

## 2024-11-25 DIAGNOSIS — M19.012 ARTHRITIS OF LEFT GLENOHUMERAL JOINT: Primary | ICD-10-CM

## 2024-11-25 DIAGNOSIS — M25.512 LEFT SHOULDER PAIN, UNSPECIFIED CHRONICITY: Primary | ICD-10-CM

## 2024-11-25 DIAGNOSIS — M25.552 LEFT HIP PAIN: ICD-10-CM

## 2024-11-25 PROCEDURE — 1125F AMNT PAIN NOTED PAIN PRSNT: CPT

## 2024-11-25 PROCEDURE — 99203 OFFICE O/P NEW LOW 30 MIN: CPT

## 2024-11-25 PROCEDURE — 1123F ACP DISCUSS/DSCN MKR DOCD: CPT

## 2024-11-25 RX ORDER — DICLOFENAC SODIUM 75 MG/1
75 TABLET, DELAYED RELEASE ORAL 2 TIMES DAILY WITH MEALS
Qty: 28 TABLET | Refills: 0 | OUTPATIENT
Start: 2024-11-25 | End: 2024-12-09

## 2024-11-25 NOTE — PROGRESS NOTES
Orthopedic Shoulder Encounter Note     Chief complaint: Left shoulder pain    HPI: Reta Flores is a 69 y.o.  right-hand dominant male who presents for evaluation of his left shoulder.  Patient states that he has been dealing with pain into his left shoulder for many years now.  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  States that it is worse at nighttime and it does cause him difficulty with sleeping due to the pain.  He denies any true weakness but does endorse some decrease in his range of motion as well as pain localized over the anterior and posterior aspects of his shoulder.  States he has not tried too much in terms of management but does do home exercises with minimal improvement and also has tried aspirin without significant pain relief.  Denies any numbness or tingling.    Previous treatment:    NSAIDs: Has taken meloxicam in the past but has a listed reaction to this medication; taking only aspirin without much relief    Physical Therapy: Home exercises, works out on his own    Injections: None    Surgeries: None    Review of Systems:   Constitutional: Negative for fever, chills, sweats.   Neurological: Negative for headache, numbness, or weakness.   Musculoskeletal: As noted in HPI     Past Medical History  Reta  has a past medical history of Acute hepatitis, Aneurysm of ascending aorta (HCC), Asthma, Epilepsia, Family history of brain cancer, Hematuria, History of total knee replacement, Hypertension, Osteoarthritis, and Thoracic degenerative disc disease.    Past Surgical History  Reta  has a past surgical history that includes Colonoscopy (07/10/2015); Total knee arthroplasty (11/02/2016); joint replacement (Left, 01/03/2018); joint replacement (Left, 01/03/2017); Inguinal hernia repair (Right); hernia repair (08/11/2014); and hernia repair (Left,

## 2024-11-25 NOTE — TELEPHONE ENCOUNTER
Attempted to contact patient to inform him that [facility] will not be sending voltaren as patient was previously hospitalized due to reaction to Mobic. Patient should be advised to take Tylenol alternatively.

## 2024-12-02 ENCOUNTER — OFFICE VISIT (OUTPATIENT)
Dept: ORTHOPEDIC SURGERY | Age: 69
End: 2024-12-02
Payer: COMMERCIAL

## 2024-12-02 VITALS — HEIGHT: 65 IN | RESPIRATION RATE: 14 BRPM | WEIGHT: 220 LBS | BODY MASS INDEX: 36.65 KG/M2

## 2024-12-02 DIAGNOSIS — M19.012 OSTEOARTHRITIS OF LEFT GLENOHUMERAL JOINT: Primary | ICD-10-CM

## 2024-12-02 PROCEDURE — 1125F AMNT PAIN NOTED PAIN PRSNT: CPT | Performed by: ORTHOPAEDIC SURGERY

## 2024-12-02 PROCEDURE — 99214 OFFICE O/P EST MOD 30 MIN: CPT | Performed by: ORTHOPAEDIC SURGERY

## 2024-12-02 PROCEDURE — 20610 DRAIN/INJ JOINT/BURSA W/O US: CPT | Performed by: ORTHOPAEDIC SURGERY

## 2024-12-02 PROCEDURE — 1123F ACP DISCUSS/DSCN MKR DOCD: CPT | Performed by: ORTHOPAEDIC SURGERY

## 2024-12-02 RX ORDER — TRIAMCINOLONE ACETONIDE 40 MG/ML
40 INJECTION, SUSPENSION INTRA-ARTICULAR; INTRAMUSCULAR ONCE
Status: COMPLETED | OUTPATIENT
Start: 2024-12-02 | End: 2024-12-02

## 2024-12-02 RX ORDER — LIDOCAINE HYDROCHLORIDE 10 MG/ML
5 INJECTION, SOLUTION INFILTRATION; PERINEURAL ONCE
Status: COMPLETED | OUTPATIENT
Start: 2024-12-02 | End: 2024-12-02

## 2024-12-02 RX ADMIN — LIDOCAINE HYDROCHLORIDE 5 ML: 10 INJECTION, SOLUTION INFILTRATION; PERINEURAL at 14:34

## 2024-12-02 RX ADMIN — TRIAMCINOLONE ACETONIDE 40 MG: 40 INJECTION, SUSPENSION INTRA-ARTICULAR; INTRAMUSCULAR at 14:34

## 2024-12-02 NOTE — PROGRESS NOTES
ORTHOPEDIC PATIENT EVALUATION      HPI / Chief Complaint  Reta Flores is a 69 y.o. male who presents for evaluation of his left shoulder.  He is right-hand dominant.  He was seen by Anthony Parham PA-C recently and diagnosed with advanced glenohumeral joint osteoarthritis.  They had discussed treatment options and the patient was apparently interested in surgery and so he was sent on to my clinic for further discussions.  The patient indicates that he has been dealing with pain in his left shoulder for quite some time but it has progressively gotten worse.  He remains active training others in  boxing.    Past Medical History  Reta  has a past medical history of Acute hepatitis, Aneurysm of ascending aorta (HCC), Asthma, Epilepsia, Family history of brain cancer, Hematuria, History of total knee replacement, Hypertension, Osteoarthritis, and Thoracic degenerative disc disease.    Past Surgical History  Reta  has a past surgical history that includes Colonoscopy (07/10/2015); Total knee arthroplasty (11/02/2016); joint replacement (Left, 01/03/2018); joint replacement (Left, 01/03/2017); Inguinal hernia repair (Right); hernia repair (08/11/2014); and hernia repair (Left, 5/30/2023).    Current Medications  Current Outpatient Medications   Medication Sig Dispense Refill    fluticasone-salmeterol (WIXELA INHUB) 100-50 MCG/ACT AEPB diskus inhaler Inhale 1 puff into the lungs in the morning and 1 puff in the evening. 3 each 3    lisinopril (PRINIVIL;ZESTRIL) 20 MG tablet take 1 tablet by mouth once daily 60 tablet 0    aspirin (ASPIRIN LOW DOSE) 81 MG EC tablet take 1 tablet by mouth once daily 90 tablet 3    amLODIPine (NORVASC) 10 MG tablet take 1 tablet by mouth once daily 90 tablet 3    fluticasone (FLONASE) 50 MCG/ACT nasal spray instill 1 TO 2 spray into each nostril once daily 48 g 3    albuterol sulfate HFA (PROVENTIL;VENTOLIN;PROAIR) 108 (90 Base) MCG/ACT inhaler inhale 2 puffs by mouth and INTO THE

## 2024-12-04 ENCOUNTER — HOSPITAL ENCOUNTER (OUTPATIENT)
Dept: VASCULAR LAB | Age: 69
Discharge: HOME | End: 2024-12-06
Payer: COMMERCIAL

## 2024-12-04 ENCOUNTER — OFFICE VISIT (OUTPATIENT)
Dept: ORTHOPEDIC SURGERY | Age: 69
End: 2024-12-04
Payer: COMMERCIAL

## 2024-12-04 VITALS — BODY MASS INDEX: 37.49 KG/M2 | HEIGHT: 65 IN | WEIGHT: 225 LBS | RESPIRATION RATE: 14 BRPM

## 2024-12-04 DIAGNOSIS — Z96.652 HISTORY OF TOTAL KNEE ARTHROPLASTY, LEFT: ICD-10-CM

## 2024-12-04 DIAGNOSIS — M53.3 SACROILIAC JOINT PAIN: ICD-10-CM

## 2024-12-04 DIAGNOSIS — M79.662 PAIN OF LEFT CALF: Primary | ICD-10-CM

## 2024-12-04 DIAGNOSIS — M79.662 PAIN OF LEFT CALF: ICD-10-CM

## 2024-12-04 DIAGNOSIS — M25.552 LEFT HIP PAIN: ICD-10-CM

## 2024-12-04 LAB — ECHO BSA: 2.16 M2

## 2024-12-04 PROCEDURE — 99214 OFFICE O/P EST MOD 30 MIN: CPT | Performed by: PHYSICIAN ASSISTANT

## 2024-12-04 PROCEDURE — 1125F AMNT PAIN NOTED PAIN PRSNT: CPT | Performed by: PHYSICIAN ASSISTANT

## 2024-12-04 PROCEDURE — 93971 EXTREMITY STUDY: CPT

## 2024-12-04 PROCEDURE — 1123F ACP DISCUSS/DSCN MKR DOCD: CPT | Performed by: PHYSICIAN ASSISTANT

## 2024-12-04 PROCEDURE — 93971 EXTREMITY STUDY: CPT | Performed by: STUDENT IN AN ORGANIZED HEALTH CARE EDUCATION/TRAINING PROGRAM

## 2024-12-04 RX ORDER — TIZANIDINE 2 MG/1
2 TABLET ORAL 3 TIMES DAILY PRN
Qty: 30 TABLET | Refills: 0 | Status: SHIPPED | OUTPATIENT
Start: 2024-12-04

## 2024-12-04 RX ORDER — METHYLPREDNISOLONE 4 MG/1
TABLET ORAL
Qty: 1 KIT | Refills: 0 | Status: SHIPPED | OUTPATIENT
Start: 2024-12-04 | End: 2024-12-10

## 2024-12-04 ASSESSMENT — ENCOUNTER SYMPTOMS
VOMITING: 0
COLOR CHANGE: 0
BACK PAIN: 1
SHORTNESS OF BREATH: 0
COUGH: 0

## 2024-12-04 NOTE — PROGRESS NOTES
Employer: NONE   Tobacco Use    Smoking status: Never    Smokeless tobacco: Never   Vaping Use    Vaping status: Never Used   Substance and Sexual Activity    Alcohol use: No     Alcohol/week: 0.0 standard drinks of alcohol    Drug use: No    Sexual activity: Not Currently   Other Topics Concern    Not on file   Social History Narrative    Not on file     Social Determinants of Health     Financial Resource Strain: Low Risk  (4/25/2024)    Overall Financial Resource Strain (CARDIA)     Difficulty of Paying Living Expenses: Not hard at all   Food Insecurity: No Food Insecurity (4/25/2024)    Hunger Vital Sign     Worried About Running Out of Food in the Last Year: Never true     Ran Out of Food in the Last Year: Never true   Transportation Needs: Unknown (4/25/2024)    PRAPARE - Transportation     Lack of Transportation (Medical): Not on file     Lack of Transportation (Non-Medical): No   Physical Activity: Sufficiently Active (10/16/2024)    Exercise Vital Sign     Days of Exercise per Week: 7 days     Minutes of Exercise per Session: 60 min   Stress: Not on file   Social Connections: Not on file   Intimate Partner Violence: Not on file   Housing Stability: Unknown (4/25/2024)    Housing Stability Vital Sign     Unable to Pay for Housing in the Last Year: Not on file     Number of Places Lived in the Last Year: Not on file     Unstable Housing in the Last Year: No       Family History:  Family History   Problem Relation Age of Onset    Heart Disease Father         chf    Cancer Sister         Brain Cancer       REVIEW OF SYSTEMS:  Review of Systems   Constitutional:  Negative for activity change and fever.   HENT:  Negative for sneezing.    Respiratory:  Negative for cough and shortness of breath.    Cardiovascular:  Negative for chest pain.   Gastrointestinal:  Negative for vomiting.   Musculoskeletal:  Positive for back pain (left sided low back). Negative for arthralgias, joint swelling and myalgias.   Skin:

## 2024-12-06 ENCOUNTER — TELEPHONE (OUTPATIENT)
Dept: ORTHOPEDIC SURGERY | Age: 69
End: 2024-12-06

## 2024-12-06 NOTE — TELEPHONE ENCOUNTER
Patient was notified of the doppler results being negative, and the providers response to continue PT and medications that were ordered. Also to follow up with Katia Batres PA-C if back pain continues. Patient verbalized an understanding.

## 2025-04-15 ASSESSMENT — ENCOUNTER SYMPTOMS
CHEST TIGHTNESS: 0
APNEA: 1
ABDOMINAL PAIN: 0
ABDOMINAL DISTENTION: 0
CONSTIPATION: 0
VOMITING: 0
DIARRHEA: 0
NAUSEA: 0
BACK PAIN: 0
RHINORRHEA: 0
SORE THROAT: 0

## 2025-04-15 NOTE — PROGRESS NOTES
Virginia Pittman, APRN-CNP  [facility]  [facility]  [address], [address]  [place] [address]  Dept: [phone]  Dept Fax: [phone]     Medicare Annual Wellness Visit    Reta Flores is here for No chief complaint on file.    Assessment & Plan   1. Medicare annual wellness visit, subsequent  - Positive risk factors, screenings and interventions discussed  - Home safety tips provided  - Continue with annual medicare wellness visits  - Will cont with current treatment as pt is currently stable on current treatment  - Continue with all medications as previously prescribed.  - Will cont to follow with all specialists as previously as instructed            No follow-ups on file.     Subjective       Patient's complete Health Risk Assessment and screening values have been reviewed and are found in Flowsheets. The following problems were reviewed today and where indicated follow up appointments were made and/or referrals ordered.    Positive Risk Factor Screenings with Interventions:               Poor Eating Habits/Diet:  Do you eat balanced/healthy meals regularly?: (!) No  Interventions:  See AVS for additional education material    Abnormal BMI (obese):  Body mass index is 37.84 kg/m². (!) Abnormal  Interventions:  See AVS for additional education material          Vision Screen:  Do you have difficulty driving, watching TV, or doing any of your daily activities because of your eyesight?: (!) Yes  Have you had an eye exam within the past year?: (!) No  Interventions:   Patient encouraged to make appointment with their eye specialist      Advanced Directives:  Do you have a Living Will?: (!) No  Intervention:  has NO advanced directive - information provided                 Objective   There were no vitals filed for this visit.   There is no height or weight on file to calculate BMI.                    Allergies   Allergen Reactions    Mobic [Meloxicam]      
(PROVENTIL;VENTOLIN;PROAIR) 108 (90 Base) MCG/ACT inhaler inhale 2 puffs by mouth and INTO THE LUNGS every 4 hours if needed for wheezing 25.5 g 3    pantoprazole (PROTONIX) 40 MG tablet Take 1 tablet by mouth every morning (before breakfast) 90 tablet 3    albuterol (PROVENTIL) (2.5 MG/3ML) 0.083% nebulizer solution Take 3 mLs by nebulization every 6 hours as needed for Wheezing or Shortness of Breath 120 each 3     No current facility-administered medications on file prior to visit.     SUBJECTIVE:     Review of Systems   Constitutional:  Negative for activity change, fatigue and fever.   HENT:  Negative for congestion, ear pain, rhinorrhea and sore throat.    Respiratory:  Positive for apnea (stable on CPAP). Negative for cough, chest tightness, shortness of breath and wheezing.         COPD (stable on Symbicort 80-4.5 BID & Albuterol PRN)   Cardiovascular:  Negative for chest pain and palpitations.   Gastrointestinal:  Negative for abdominal distention, abdominal pain, constipation, diarrhea, nausea and vomiting.        Heartburn (stable on Protonix 40 mg daily)   Endocrine: Negative for polydipsia, polyphagia and polyuria.   Genitourinary:  Negative for difficulty urinating, dysuria, frequency and urgency.   Musculoskeletal:  Negative for arthralgias, back pain and myalgias.   Skin:  Negative for rash.   Allergic/Immunologic: Positive for environmental allergies (stable on Zyrtec 10 mg daily).   Neurological:  Negative for dizziness, weakness, light-headedness and headaches.   Hematological:  Negative for adenopathy.   Psychiatric/Behavioral:  Negative for agitation and behavioral problems. The patient is not nervous/anxious.      OBJECTIVE:  /80   Pulse 69   Ht 1.613 m (5' 3.5\")   Wt 98.4 kg (217 lb)   SpO2 98%   BMI 37.84 kg/m²     Physical Exam  Vitals and nursing note reviewed.   Constitutional:       General: He is not in acute distress.     Appearance: Normal appearance. He is well-developed.

## 2025-04-16 ENCOUNTER — OFFICE VISIT (OUTPATIENT)
Dept: FAMILY MEDICINE CLINIC | Age: 70
End: 2025-04-16

## 2025-04-16 VITALS
HEART RATE: 69 BPM | WEIGHT: 217 LBS | BODY MASS INDEX: 37.05 KG/M2 | HEIGHT: 64 IN | SYSTOLIC BLOOD PRESSURE: 136 MMHG | DIASTOLIC BLOOD PRESSURE: 80 MMHG | OXYGEN SATURATION: 98 %

## 2025-04-16 DIAGNOSIS — J44.9 CHRONIC OBSTRUCTIVE PULMONARY DISEASE, UNSPECIFIED COPD TYPE (HCC): ICD-10-CM

## 2025-04-16 DIAGNOSIS — J45.20 MILD INTERMITTENT ASTHMATIC BRONCHITIS WITHOUT COMPLICATION: ICD-10-CM

## 2025-04-16 DIAGNOSIS — I10 ESSENTIAL HYPERTENSION: ICD-10-CM

## 2025-04-16 DIAGNOSIS — Z00.00 MEDICARE ANNUAL WELLNESS VISIT, SUBSEQUENT: Primary | ICD-10-CM

## 2025-04-16 DIAGNOSIS — I77.810 ASCENDING AORTA DILATATION: ICD-10-CM

## 2025-04-16 DIAGNOSIS — I51.9 LEFT VENTRICULAR DIASTOLIC DYSFUNCTION: ICD-10-CM

## 2025-04-16 DIAGNOSIS — G47.33 OSA ON CPAP: ICD-10-CM

## 2025-04-16 DIAGNOSIS — R73.9 HYPERGLYCEMIA: ICD-10-CM

## 2025-04-16 DIAGNOSIS — K21.9 GASTROESOPHAGEAL REFLUX DISEASE WITHOUT ESOPHAGITIS: ICD-10-CM

## 2025-04-16 DIAGNOSIS — J30.2 SEASONAL ALLERGIC RHINITIS, UNSPECIFIED TRIGGER: ICD-10-CM

## 2025-04-16 RX ORDER — AZITHROMYCIN 250 MG/1
TABLET, FILM COATED ORAL
Qty: 6 TABLET | Refills: 0 | Status: SHIPPED | OUTPATIENT
Start: 2025-04-16 | End: 2025-04-26

## 2025-04-16 RX ORDER — PREDNISONE 20 MG/1
TABLET ORAL
Qty: 17 TABLET | Refills: 0 | Status: SHIPPED | OUTPATIENT
Start: 2025-04-16 | End: 2025-04-26

## 2025-04-16 RX ORDER — LISINOPRIL 20 MG/1
20 TABLET ORAL DAILY
Qty: 90 TABLET | Refills: 3 | Status: SHIPPED | OUTPATIENT
Start: 2025-04-16

## 2025-04-16 RX ORDER — AMLODIPINE BESYLATE 10 MG/1
TABLET ORAL
Qty: 90 TABLET | Refills: 3 | Status: SHIPPED | OUTPATIENT
Start: 2025-04-16

## 2025-04-16 SDOH — ECONOMIC STABILITY: FOOD INSECURITY: WITHIN THE PAST 12 MONTHS, YOU WORRIED THAT YOUR FOOD WOULD RUN OUT BEFORE YOU GOT MONEY TO BUY MORE.: NEVER TRUE

## 2025-04-16 SDOH — ECONOMIC STABILITY: FOOD INSECURITY: WITHIN THE PAST 12 MONTHS, THE FOOD YOU BOUGHT JUST DIDN'T LAST AND YOU DIDN'T HAVE MONEY TO GET MORE.: NEVER TRUE

## 2025-04-16 ASSESSMENT — ENCOUNTER SYMPTOMS
COUGH: 0
SHORTNESS OF BREATH: 0
WHEEZING: 0

## 2025-04-16 ASSESSMENT — PATIENT HEALTH QUESTIONNAIRE - PHQ9
SUM OF ALL RESPONSES TO PHQ QUESTIONS 1-9: 0
2. FEELING DOWN, DEPRESSED OR HOPELESS: NOT AT ALL
1. LITTLE INTEREST OR PLEASURE IN DOING THINGS: NOT AT ALL

## 2025-06-28 DIAGNOSIS — I10 ESSENTIAL HYPERTENSION: ICD-10-CM

## 2025-06-30 RX ORDER — ASPIRIN 81 MG/1
81 TABLET, COATED ORAL DAILY
Qty: 90 TABLET | Refills: 3 | Status: SHIPPED | OUTPATIENT
Start: 2025-06-30

## (undated) DEVICE — SHEET, T, LAPAROTOMY, STERILE: Brand: MEDLINE

## (undated) DEVICE — SEALER LAP SM L18.8CM OPN JAW HAND/FOOT SWCH FORCETRIAD

## (undated) DEVICE — HYPODERMIC SAFETY NEEDLE: Brand: MAGELLAN

## (undated) DEVICE — SOLUTION SCRB 4OZ 4% CHG H2O AIDED FOR PREOPERATIVE SKIN

## (undated) DEVICE — SUTURE PDS + SZ 4 0 L27IN ABSRB VLT L26MM SH 1 2 CIR PDP315H

## (undated) DEVICE — SUTURE VCRL + SZ 3-0 L36IN ABSRB UD L36MM CT-1 1/2 CIR VCP944H

## (undated) DEVICE — COVER,MAYO STAND,STERILE: Brand: MEDLINE

## (undated) DEVICE — PREMIUM DRY TRAY LF: Brand: MEDLINE INDUSTRIES, INC.

## (undated) DEVICE — STRIP,CLOSURE,WOUND,MEDI-STRIP,1/2X4: Brand: MEDLINE

## (undated) DEVICE — SOLUTION IRRIG 1000ML STRL H2O USP PLAS POUR BTL

## (undated) DEVICE — SUTURE PERMAHAND SZ 0 L30IN NONABSORBABLE BLK FSL L30MM 3/8 680H

## (undated) DEVICE — ADHESIVE SKIN CLOSURE TOP 36 CC HI VISC DERMBND MINI

## (undated) DEVICE — 3M™ STERI-STRIP™ REINFORCED ADHESIVE SKIN CLOSURES, R1547, 1/2 IN X 4 IN (12 MM X 100 MM), 6 STRIPS/ENVELOPE: Brand: 3M™ STERI-STRIP™

## (undated) DEVICE — SUTURE NONABSORBABLE BRAIDED 2-0 CT-2 1X30 IN ETHBND EXCEL X411H

## (undated) DEVICE — DRESSING TRNSPAR W5XL4.5IN FLM SHT SEMIPERMEABLE WIND

## (undated) DEVICE — ST CHARLES MINOR ABDOMINAL PK: Brand: MEDLINE INDUSTRIES, INC.

## (undated) DEVICE — SINGLE PORT MANIFOLD: Brand: NEPTUNE 2

## (undated) DEVICE — SOLUTION IRRIG 1000ML 0.9% SOD CHL USP POUR PLAS BTL

## (undated) DEVICE — SUTURE ETHBND SZ 2-0 L18IN NONABSORBABLE GRN L26MM CT-2 1/2 CX26D

## (undated) DEVICE — SYRINGE ONLY,20ML LUER LOCK: Brand: MEDLINE INDUSTRIES, INC.

## (undated) DEVICE — SUTURE MCRYL + SZ 4-0 L27IN ABSRB UD L19MM PS-2 3/8 CIR MCP426H

## (undated) DEVICE — PAD,NON-ADHERENT,3X8,STERILE,LF,1/PK: Brand: MEDLINE

## (undated) DEVICE — GOWN,SIRUS,NONRNF,SETINSLV,XL,20/CS: Brand: MEDLINE

## (undated) DEVICE — MERCY HEALTH ST CHARLES: Brand: MEDLINE INDUSTRIES, INC.

## (undated) DEVICE — SUTURE VCRL + SZ 2-0 L27IN ABSRB WHT SH 1/2 CIR TAPERCUT VCP417H

## (undated) DEVICE — GLOVE ORTHO 7 1/2   MSG9475

## (undated) DEVICE — STRL PENROSE DRAIN 18" X 1/4": Brand: CARDINAL HEALTH

## (undated) DEVICE — GOWN,AURORA,NONREINFORCED,LARGE: Brand: MEDLINE